# Patient Record
Sex: MALE | Race: WHITE | NOT HISPANIC OR LATINO | Employment: OTHER | URBAN - METROPOLITAN AREA
[De-identification: names, ages, dates, MRNs, and addresses within clinical notes are randomized per-mention and may not be internally consistent; named-entity substitution may affect disease eponyms.]

---

## 2017-02-09 DIAGNOSIS — E03.4 HYPOTHYROIDISM DUE TO ACQUIRED ATROPHY OF THYROID: Primary | ICD-10-CM

## 2017-02-09 RX ORDER — LEVOTHYROXINE SODIUM 88 UG/1
88 TABLET ORAL DAILY
Qty: 90 TABLET | Refills: 3 | OUTPATIENT
Start: 2017-02-09

## 2017-03-16 ENCOUNTER — OFFICE VISIT (OUTPATIENT)
Dept: FAMILY MEDICINE | Facility: CLINIC | Age: 72
End: 2017-03-16
Payer: COMMERCIAL

## 2017-03-16 VITALS
HEIGHT: 65 IN | DIASTOLIC BLOOD PRESSURE: 74 MMHG | SYSTOLIC BLOOD PRESSURE: 126 MMHG | OXYGEN SATURATION: 96 % | WEIGHT: 186 LBS | TEMPERATURE: 98.1 F | BODY MASS INDEX: 30.99 KG/M2 | HEART RATE: 58 BPM | RESPIRATION RATE: 16 BRPM

## 2017-03-16 DIAGNOSIS — R10.11 ABDOMINAL PAIN, RIGHT UPPER QUADRANT: ICD-10-CM

## 2017-03-16 DIAGNOSIS — R73.03 PRE-DIABETES: ICD-10-CM

## 2017-03-16 DIAGNOSIS — I71.40 ABDOMINAL AORTIC ANEURYSM (AAA) WITHOUT RUPTURE (H): Primary | ICD-10-CM

## 2017-03-16 DIAGNOSIS — I10 HTN, GOAL BELOW 140/90: ICD-10-CM

## 2017-03-16 DIAGNOSIS — M54.50 RIGHT-SIDED LOW BACK PAIN WITHOUT SCIATICA, UNSPECIFIED CHRONICITY: ICD-10-CM

## 2017-03-16 LAB
ALBUMIN UR-MCNC: NEGATIVE MG/DL
APPEARANCE UR: CLEAR
BILIRUB UR QL STRIP: NEGATIVE
COLOR UR AUTO: YELLOW
GLUCOSE UR STRIP-MCNC: NEGATIVE MG/DL
HBA1C MFR BLD: 6.9 % (ref 4.3–6)
HGB UR QL STRIP: NEGATIVE
KETONES UR STRIP-MCNC: NEGATIVE MG/DL
LEUKOCYTE ESTERASE UR QL STRIP: NEGATIVE
LIPASE SERPL-CCNC: 108 U/L (ref 73–393)
NITRATE UR QL: NEGATIVE
PH UR STRIP: 5.5 PH (ref 5–7)
SP GR UR STRIP: 1.02 (ref 1–1.03)
URN SPEC COLLECT METH UR: NORMAL
UROBILINOGEN UR STRIP-ACNC: 0.2 EU/DL (ref 0.2–1)

## 2017-03-16 PROCEDURE — 83036 HEMOGLOBIN GLYCOSYLATED A1C: CPT | Performed by: FAMILY MEDICINE

## 2017-03-16 PROCEDURE — 80053 COMPREHEN METABOLIC PANEL: CPT | Performed by: FAMILY MEDICINE

## 2017-03-16 PROCEDURE — 83690 ASSAY OF LIPASE: CPT | Performed by: FAMILY MEDICINE

## 2017-03-16 PROCEDURE — 81003 URINALYSIS AUTO W/O SCOPE: CPT | Performed by: FAMILY MEDICINE

## 2017-03-16 PROCEDURE — 99214 OFFICE O/P EST MOD 30 MIN: CPT | Performed by: FAMILY MEDICINE

## 2017-03-16 PROCEDURE — 36415 COLL VENOUS BLD VENIPUNCTURE: CPT | Performed by: FAMILY MEDICINE

## 2017-03-16 NOTE — MR AVS SNAPSHOT
"              After Visit Summary   3/16/2017    Rubio Gibbons    MRN: [id number]           Patient Information     Date Of Birth          1945        Visit Information        Provider Department      3/16/2017 9:00 AM Rigo Goddard MD [facility]        Today's Diagnoses     Abdominal aortic aneurysm (AAA) without rupture (H)    -  1    Abdominal pain, right upper quadrant        Right-sided low back pain without sciatica, unspecified chronicity        HTN, goal below 140/90        Pre-diabetes          Care Instructions    Fiber (Citracal) Metamucil)  30 grams daily        Follow-ups after your visit        Future tests that were ordered for you today     Open Future Orders        Priority Expected Expires Ordered    US Abdomen Complete Routine 6/14/2017 3/16/2018 3/16/2017            Who to contact     If you have questions or need follow up information about today's clinic visit or your schedule please contact [facility] directly at [phone].  Normal or non-critical lab and imaging results will be communicated to you by MyChart, letter or phone within 4 business days after the clinic has received the results. If you do not hear from us within 7 days, please contact the clinic through [employer] or phone. If you have a critical or abnormal lab result, we will notify you by phone as soon as possible.  Submit refill requests through [employer] or call your pharmacy and they will forward the refill request to us. Please allow 3 business days for your refill to be completed.          Additional Information About Your Visit        [employer] Information     [employer] lets you send messages to your doctor, view your test results, renew your prescriptions, schedule appointments and more. To sign up, go to [email or web address] . Click on \"Log in\" on the left side of the screen, which will take you to the Welcome page. Then click on \"Sign up Now\" on the right side of the page. " "    You will be asked to enter the access code listed below, as well as some personal information. Please follow the directions to create your username and password.     Your access code is: [email or web address]  Expires: 2017  9:10 AM     Your access code will  in 90 days. If you need help or a new code, please call your [facility] clinic or [phone].        Care EveryWhere ID     This is your Care EveryWhere ID. This could be used by other organizations to access your [facility] medical records  [id number]        Your Vitals Were     Pulse Temperature Respirations Height Pulse Oximetry BMI (Body Mass Index)    58 98.1  F (36.7  C) (Oral) 16 5' 5\" (1.651 m) 96% 30.95 kg/m2       Blood Pressure from Last 3 Encounters:   17 126/74   16 137/80   16 92/60    Weight from Last 3 Encounters:   17 186 lb (84.4 kg)   16 181 lb (82.1 kg)   16 181 lb 3.2 oz (82.2 kg)              We Performed the Following     *UA reflex to Microscopic and Culture ([facility] (except [place] and [place])     Comprehensive metabolic panel     Hemoglobin A1c     Lipase        Primary Care Provider Office Phone # Fax #    Rigo Goddard [phone]       [address] [address]        Thank you!     Thank you for choosing [facility]  for your care. Our goal is always to provide you with excellent care. Hearing back from our patients is one way we can continue to improve our services. Please take a few minutes to complete the written survey that you may receive in the mail after your visit with us. Thank you!             Your Updated Medication List - Protect others around you: Learn how to safely use, store and throw away your medicines at www.disposemymeds.org.          This list is accurate as of: 3/16/17  9:46 PM.  Always use your most recent med list.                   Brand Name Dispense " Instructions for use    aspirin 81 MG tablet     100 tablet    Take 1 tablet (81 mg) by mouth daily       atorvastatin 40 MG tablet    LIPITOR    90 tablet    Take 1 tablet (40 mg) by mouth daily       buPROPion 150 MG 12 hr tablet    WELLBUTRIN SR    90 tablet    Take 1 tablet (150 mg) by mouth daily       levothyroxine 88 MCG tablet    SYNTHROID/LEVOTHROID    90 tablet    Take 1 tablet (88 mcg) by mouth daily       lisinopril 5 MG tablet    PRINIVIL/ZESTRIL    90 tablet    Take 1 tablet (5 mg) by mouth daily       metoprolol 50 MG 24 hr tablet    TOPROL XL    90 tablet    Take 1 tablet (50 mg) by mouth daily       sildenafil 100 MG cap/tab    VIAGRA    6 tablet    Take 1 tablet (100 mg) by mouth daily as needed for erectile dysfunction Take 30 min to 4 hours before intercourse.  Never use with nitroglycerin, terazosin or doxazosin.       tiotropium 18 MCG capsule    SPIRIVA HANDIHALER    30 capsule    Inhale contents of one capsule daily.

## 2017-03-16 NOTE — NURSING NOTE
"Chief Complaint   Patient presents with     Back Pain       Initial There were no vitals taken for this visit. Estimated body mass index is 30.12 kg/(m^2) as calculated from the following:    Height as of 12/6/16: 5' 5\" (1.651 m).    Weight as of 12/6/16: 181 lb (82.1 kg).  Medication Reconciliation: complete left arm Jenifer Laguna MA      "

## 2017-03-16 NOTE — PROGRESS NOTES
SUBJECTIVE:                                                    Rubio Gibbons is a 71 year old male who presents to clinic today for the following health issues:    ABDOMINAL PAIN     Onset: 3 weeks. RUQ relieved by BM    Description:   Character: Sharp  Location: right upper quadrant  Radiation: lower back and buttock    Intensity: moderate, severe    Progression of Symptoms:   waxing and waning    Accompanying Signs & Symptoms:  Fever/Chills: no   Gas/Bloating: no   Nausea: no   Vomitting: no   Diarrhea: no   Constipation:no   Dysuria or Hematuria: no   Rash: no  Urinary frequency at night: no  Bowel movement modifies pain: YES - Patient states that after a bowel movement he experiences an alleviation of pain   History:   Trauma: no   Previous similar pain: no    Previous tests done: none    Precipitating factors:   Does the pain change with:     Food: no      BM: YES- gets better    Urination: no     Alleviating factors:      Therapies Tried and outcome:     LMP:  not applicable             Abdominal aortic aneurysm (AAA) without rupture (H): known, measured last fall      HTN, goal below 140/90: BP normal today  BP Readings from Last 6 Encounters:   03/16/17 126/74   12/06/16 137/80   11/17/16 92/60   11/01/16 121/80   09/22/16 102/82   02/29/16 120/74         Right-sided low back pain without sciatica, unspecified chronicity: The patient states that he has been experiencing right sided low back pain that radiates to his buttock       Pre-diabetes:   Lab Results   Component Value Date    A1C 6.5 09/22/2016    A1C 6.1 05/08/2014    A1C 6.5 01/30/2013    A1C 6.3 07/16/2009     Glucose   Date Value Ref Range Status   09/22/2016 123 (H) 70 - 99 mg/dL Final       Past Medical History   Diagnosis Date     Abdominal aortic aneurysm (AAA) without rupture (H) 10/13/2016     CAD (coronary artery disease)      Diabetes mellitus type 2, uncomplicated (H) 9/22/2016     Diet      WIN (dyspnea on exertion) 11/21/2014      "History of myocardial infarction in adulthood 9/22/2016     HTN, goal below 140/90 1/28/2014     Hyperlipidemia LDL goal <100 10/31/2010     Hypothyroidism 11/9/2011     Hypothyroidism due to acquired atrophy of thyroid 2/18/2016     Ischemic cardiomyopathy      LBP (low back pain) 11/21/2014     Microalbuminuria 11/7/2016     X1     Other abnormal glucose 5/8/2014     Panlobular emphysema (H) 9/22/2016     Presbycusis syndrome, bilateral 9/22/2016     Right-sided low back pain without sciatica, unspecified chronicity 3/16/2017     Tobacco use disorder 7/19/2006     Tubular adenoma        Past Surgical History   Procedure Laterality Date     Angioplasty  1998     Colonoscopy N/A 11/17/2016     Procedure: COLONOSCOPY;  Surgeon: Adam Lantigua MD;  Location:  GI       Family History   Problem Relation Age of Onset     Cardiovascular Father      heart attack     Cardiovascular Brother      heart attack     Cardiovascular Sister      2 sisters - minor cardiac problems- pt unsure of the exact nature       Social History   Substance Use Topics     Smoking status: Current Some Day Smoker     Packs/day: 0.25     Years: 40.00     Types: Cigarettes     Smokeless tobacco: Never Used     Alcohol use No       ROS: No hematochezia, rash cough    This document serves as a record of the services and decisions personally performed and made by Nitza Sierra MD. It was created on his behalf by Black Hampton a trained medical scribe. The creation of this document is based the provider's statements to the medical scribe. Black Hampton March 16, 2017 9:04 AM  OBJECTIVE:                                                    /74 (BP Location: Right arm, Patient Position: Chair, Cuff Size: Adult Regular)  Pulse 58  Temp 98.1  F (36.7  C) (Oral)  Resp 16  Ht 1.651 m (5' 5\")  Wt 84.4 kg (186 lb)  SpO2 96%  BMI 30.95 kg/m2  Body mass index is 30.95 kg/(m^2).  GEN: Healthy, Alert, No distress  ABD: soft without mass or organomegaly, " tenderness to palpation over RLQ   Neg SLR no antalgia  ASSESSMENT/PLAN:                                                    (R10.31) Abdominal pain, right lower quadrant  (primary encounter diagnosis)  Comment: Broaden database. Increase fiber intake (vegtables)  Plan: Comprehensive metabolic panel, Lipase, US         Abdomen Complete, *UA reflex to Microscopic and        Culture ([facility]         (except [place] and [place])            (I71.4) Abdominal aortic aneurysm (AAA) without rupture (H)  Comment: re assess  Plan:     (I10) HTN, goal below 140/90  Comment: Routine monitoring. BP normal today  BP Readings from Last 6 Encounters:   03/16/17 126/74   12/06/16 137/80   11/17/16 92/60   11/01/16 121/80   09/22/16 102/82   02/29/16 120/74   Plan: Comprehensive metabolic panel            (M54.5) Right-sided low back pain without sciatica, unspecified chronicity  Comment: known present, contributes  Plan: longer standing. Re assess after current W/u    (R73.03) Pre-diabetes  Comment: periodic   Lab Results   Component Value Date    A1C 6.5 09/22/2016    A1C 6.1 05/08/2014    A1C 6.5 01/30/2013    A1C 6.3 07/16/2009     Glucose   Date Value Ref Range Status   09/22/2016 123 (H) 70 - 99 mg/dL Final   Plan: Hemoglobin A1c              RCK in 2 months if abnormal lab or ultrasound, otherwise in 6 months Fiber for GI source. Endoscopy UTD    The information in this document, created by a scribe for me, accurately reflects the services I personally performed and the decisions made by me. I have reviewed and approved this document for accuracy. 9:04 AM March 16, 2017      ROLAND BRAXTON MD  [facility]

## 2017-03-16 NOTE — LETTER
[facility]  [address] [place], [address]  [phone]      March 16, 2017    Rubio Gibbons  [address] [facility] [address]          Dear Rubio,    The results of your recent tests are back. Tests are ok.  Enclosed is a copy of the results.  It was a pleasure to see you at your last appointment.  Results for orders placed or performed in visit on 03/16/17   Hemoglobin A1c   Result Value Ref Range    Hemoglobin A1C 6.9 (H) 4.3 - 6.0 %   *UA reflex to Microscopic and Culture ([facility] (except [place] and [place])   Result Value Ref Range    Color Urine Yellow     Appearance Urine Clear     Glucose Urine Negative NEG mg/dL    Bilirubin Urine Negative NEG    Ketones Urine Negative NEG mg/dL    Specific Gravity Urine 1.020 1.003 - 1.035    Blood Urine Negative NEG    pH Urine 5.5 5.0 - 7.0 pH    Protein Albumin Urine Negative NEG mg/dL    Urobilinogen Urine 0.2 0.2 - 1.0 EU/dL    Nitrite Urine Negative NEG    Leukocyte Esterase Urine Negative NEG    Source Midstream Urine          If you have any questions or concerns, please call myself or my nurse at [phone].          Sincerely,    Rigo Goddard MD  [id number]

## 2017-03-16 NOTE — NURSING NOTE
"Chief Complaint   Patient presents with     Abdominal Pain     right upper quadrant that radiates into back, patient states pain is worse in the morning, and gets better after having BM. Patient states he has had the pain for 3 weeks       Initial /74 (BP Location: Right arm, Patient Position: Chair, Cuff Size: Adult Regular)  Pulse 58  Temp 98.1  F (36.7  C) (Oral)  Resp 16  Ht 5' 5\" (1.651 m)  Wt 186 lb (84.4 kg)  SpO2 96%  BMI 30.95 kg/m2 Estimated body mass index is 30.95 kg/(m^2) as calculated from the following:    Height as of this encounter: 5' 5\" (1.651 m).    Weight as of this encounter: 186 lb (84.4 kg).  Medication Reconciliation: complete   Carol Lott CMA      "

## 2017-03-17 LAB
ALBUMIN SERPL-MCNC: 3.8 G/DL (ref 3.4–5)
ALP SERPL-CCNC: 86 U/L (ref 40–150)
ALT SERPL W P-5'-P-CCNC: 21 U/L (ref 0–70)
ANION GAP SERPL CALCULATED.3IONS-SCNC: 7 MMOL/L (ref 3–14)
AST SERPL W P-5'-P-CCNC: 17 U/L (ref 0–45)
BILIRUB SERPL-MCNC: 0.3 MG/DL (ref 0.2–1.3)
BUN SERPL-MCNC: 21 MG/DL (ref 7–30)
CALCIUM SERPL-MCNC: 9 MG/DL (ref 8.5–10.1)
CHLORIDE SERPL-SCNC: 105 MMOL/L (ref 94–109)
CO2 SERPL-SCNC: 29 MMOL/L (ref 20–32)
CREAT SERPL-MCNC: 1.07 MG/DL (ref 0.66–1.25)
GFR SERPL CREATININE-BSD FRML MDRD: 68 ML/MIN/1.7M2
GLUCOSE SERPL-MCNC: 135 MG/DL (ref 70–99)
POTASSIUM SERPL-SCNC: 4.4 MMOL/L (ref 3.4–5.3)
PROT SERPL-MCNC: 7 G/DL (ref 6.8–8.8)
SODIUM SERPL-SCNC: 141 MMOL/L (ref 133–144)

## 2017-04-14 DIAGNOSIS — I10 ESSENTIAL HYPERTENSION WITH GOAL BLOOD PRESSURE LESS THAN 140/90: ICD-10-CM

## 2017-04-15 NOTE — TELEPHONE ENCOUNTER
Pending Prescriptions:                       Disp   Refills    metoprolol (TOPROL-XL) 50 MG 24 hr tablet*90 tab*0            Sig: TAKE 1 TABLET(50 MG) BY MOUTH DAILY              Last Written Prescription Date: 9/22/2016  Last Fill Quantity: 90, # refills: 1  Last Office Visit with [facility], RAFAELA or [facility] prescribing provider: 3/16/2017, Nitza       Potassium   Date Value Ref Range Status   03/16/2017 4.4 3.4 - 5.3 mmol/L Final     Creatinine   Date Value Ref Range Status   03/16/2017 1.07 0.66 - 1.25 mg/dL Final     BP Readings from Last 3 Encounters:   03/16/17 126/74   12/06/16 137/80   11/17/16 92/60

## 2017-04-17 DIAGNOSIS — F17.200 TOBACCO USE DISORDER: ICD-10-CM

## 2017-04-17 DIAGNOSIS — E03.4 HYPOTHYROIDISM DUE TO ACQUIRED ATROPHY OF THYROID: ICD-10-CM

## 2017-04-17 DIAGNOSIS — I10 ESSENTIAL HYPERTENSION WITH GOAL BLOOD PRESSURE LESS THAN 140/90: ICD-10-CM

## 2017-04-17 RX ORDER — METOPROLOL SUCCINATE 50 MG/1
TABLET, EXTENDED RELEASE ORAL
Qty: 90 TABLET | Refills: 2 | Status: CANCELLED | OUTPATIENT
Start: 2017-04-17

## 2017-04-17 RX ORDER — LISINOPRIL 5 MG/1
5 TABLET ORAL DAILY
Qty: 90 TABLET | Refills: 1 | Status: SHIPPED | OUTPATIENT
Start: 2017-04-17 | End: 2017-09-25

## 2017-04-17 RX ORDER — METOPROLOL SUCCINATE 50 MG/1
TABLET, EXTENDED RELEASE ORAL
Qty: 90 TABLET | Refills: 2 | Status: SHIPPED | OUTPATIENT
Start: 2017-04-17 | End: 2017-09-25

## 2017-04-17 RX ORDER — LEVOTHYROXINE SODIUM 88 UG/1
88 TABLET ORAL DAILY
Qty: 90 TABLET | Refills: 3 | Status: CANCELLED | OUTPATIENT
Start: 2017-04-17

## 2017-04-17 RX ORDER — BUPROPION HYDROCHLORIDE 150 MG/1
150 TABLET, EXTENDED RELEASE ORAL DAILY
Qty: 90 TABLET | Refills: 0 | Status: SHIPPED | OUTPATIENT
Start: 2017-04-17 | End: 2017-09-25

## 2017-04-17 NOTE — TELEPHONE ENCOUNTER
Patient came into clinic today asking if could speak to RN or Dr. Goddard, informed him that they are busy and should call the clinic to talk to a nurse. He stated to me that he was waiting on a medication and approval from Dr. Goddard. He is going out of town and needed to get refills on those before he leaves in a month. I had the RNs look over the notes and Yun MCCLURE sent in the approval request for the medication he was waiting on. I do not believe some of these need a refill has he as enough already.     Informed patient that these should be done and ready at pharmacy, or should still have some refills to get.     Told him to call clinic if has any other questions.

## 2017-04-17 NOTE — TELEPHONE ENCOUNTER
See below, called pt, waiting for staff from hospital to call and schedule u/s, no one ever called, gave scheduling number to call, pt will call now to schedule apt, gone next week so might be after, also wants wellbutrin refill, takes for smoking cessation, tried w/o and has urge, does not want temptations on vacation, has refills on others    Last OV: 3/16/17  Reason for visit: AAA, back pain, HYPERTENSION, pre diabetes    BP Readings from Last 2 Encounters:   03/16/17 126/74   12/06/16 137/80     Lab Results   Component Value Date    CR 1.07 03/16/2017     Lab Results   Component Value Date    POTASSIUM 4.4 03/16/2017         RCK in 2 months if abnormal lab or ultrasound, otherwise in 6 months Fiber for GI source. Endoscopy UTD  Prescription approved per [facility] Refill Protocol.  Yun Washington, RN, BSN

## 2017-04-17 NOTE — TELEPHONE ENCOUNTER
Pt walks in, wants refills asap, see last visit notes, front staff will update pt request    RCK in 2 months if abnormal lab or ultrasound, otherwise in 6 months Fiber for GI source. Endoscopy UTD  Prescription approved per [facility] Refill Protocol.  Yun Washington, RN, BSN

## 2017-04-17 NOTE — TELEPHONE ENCOUNTER
See other refill encounter today  Prescription approved per [facility] Refill Protocol.  Yun Washington, RN, BSN

## 2017-04-19 RX ORDER — BUPROPION HYDROCHLORIDE 150 MG/1
TABLET, EXTENDED RELEASE ORAL
Qty: 90 TABLET | Refills: 0 | COMMUNITY
Start: 2017-04-19 | End: 2017-09-25

## 2017-05-11 DIAGNOSIS — E78.5 HYPERLIPIDEMIA LDL GOAL <100: ICD-10-CM

## 2017-05-11 RX ORDER — ATORVASTATIN CALCIUM 40 MG/1
TABLET, FILM COATED ORAL
Status: SHIPPED
Start: 2017-05-11 | End: 2017-09-25

## 2017-05-11 NOTE — TELEPHONE ENCOUNTER
1 year supply sent 9/22/16.    atorvastatin (LIPITOR) 40 MG tablet  Last Written Prescription Date: 9/22/16  Last Fill Quantity: 90,  # refills: 3   Last Office Visit with [facility], [facility] or [facility] prescribing provider:  3/16/2017                                              Fax sent to pharm informing above.  AIXA Raymond  May 11, 2017  8:45 AM

## 2017-05-13 DIAGNOSIS — I10 ESSENTIAL HYPERTENSION WITH GOAL BLOOD PRESSURE LESS THAN 140/90: ICD-10-CM

## 2017-05-13 NOTE — TELEPHONE ENCOUNTER
Pending Prescriptions:                       Disp   Refills    lisinopril (PRINIVIL/ZESTRIL) 5 MG tablet*90 tab*0            Sig: TAKE 1 TABLET(5 MG) BY MOUTH DAILY        RX WAS LAST FILLED 4/17/2017      Last Written Prescription Date: 4/17/2017  Last Fill Quantity: 90, # refills: 1  Last Office Visit with [facility], [facility] or [facility] prescribing provider: 3/16/2017, Nitza       Potassium   Date Value Ref Range Status   03/16/2017 4.4 3.4 - 5.3 mmol/L Final     Creatinine   Date Value Ref Range Status   03/16/2017 1.07 0.66 - 1.25 mg/dL Final     BP Readings from Last 3 Encounters:   03/16/17 126/74   12/06/16 137/80   11/17/16 92/60

## 2017-05-16 RX ORDER — LISINOPRIL 5 MG/1
TABLET ORAL
Qty: 90 TABLET | Refills: 0
Start: 2017-05-16

## 2017-06-20 ENCOUNTER — HOSPITAL ENCOUNTER (OUTPATIENT)
Dept: ULTRASOUND IMAGING | Facility: CLINIC | Age: 72
Discharge: HOME | End: 2017-06-20
Attending: FAMILY MEDICINE | Admitting: FAMILY MEDICINE
Payer: COMMERCIAL

## 2017-06-20 PROCEDURE — 76700 US EXAM ABDOM COMPLETE: CPT

## 2017-06-22 ENCOUNTER — TELEPHONE (OUTPATIENT)
Dept: FAMILY MEDICINE | Facility: CLINIC | Age: 72
End: 2017-06-22

## 2017-06-22 DIAGNOSIS — K80.20 CALCULUS OF GALLBLADDER WITHOUT CHOLECYSTITIS WITHOUT OBSTRUCTION: Primary | ICD-10-CM

## 2017-06-22 NOTE — TELEPHONE ENCOUNTER
Left message for patient to call clinic back.  Carol Lott CMA    Notes Recorded by Roland Braxton MD on 6/22/2017 at 1:15 PM  Yes, you have gallstones. The aorta is stable. I would recommend we have you talk to the surgeons. I have taken the liberty of contacting them: they will call you  ROLAND BRAXTON  Results for orders placed or performed during the hospital encounter of 06/20/17   US Abdomen Complete    Narrative    ULTRASOUND ABDOMEN COMPLETE  6/20/2017 8:30 AM    HISTORY: Known abdominal aortic aneurysm, right upper quadrant pain.    COMPARISON: 10/11/2016    FINDINGS: The liver is unremarkable, without evidence for hepatic mass  or fatty infiltration. Multiple echogenic shadowing stones are seen in  the gallbladder lumen. Gallbladder wall is at the upper limits of  normal at 2.8 mm. The technologist reports a negative sonographic  Hernandez sign. No intra- or extrahepatic bile duct dilatation. Pancreas  is partially obscured by overlying bowel gas, but appears unremarkable  where seen. Unremarkable spleen. Both kidneys are normal. Mild left  pelviectasis. No right hydronephrosis. IVC is normal. There is  aneurysmal dilatation of the abdominal aorta measuring 3.8 x 3.9 cm,  previously 3.8 x 4.4 cm.      Impression    IMPRESSION:   1. Cholelithiasis without evidence of cholecystitis.  2. Abdominal aortic aneurysm measuring 3.8 x 3.9 cm; previously this  measured 3.8 x 4.4 cm. The difference in size may be due to technical  differences in measuring.  3. Mild left pelviectasis.    JUANY RUIZ DO

## 2017-06-23 ENCOUNTER — TELEPHONE (OUTPATIENT)
Dept: SURGERY | Facility: CLINIC | Age: 72
End: 2017-06-23

## 2017-07-03 ENCOUNTER — OFFICE VISIT (OUTPATIENT)
Dept: SURGERY | Facility: CLINIC | Age: 72
End: 2017-07-03
Payer: COMMERCIAL

## 2017-07-03 VITALS
BODY MASS INDEX: 28.12 KG/M2 | HEIGHT: 66 IN | WEIGHT: 175 LBS | DIASTOLIC BLOOD PRESSURE: 72 MMHG | HEART RATE: 55 BPM | OXYGEN SATURATION: 95 % | SYSTOLIC BLOOD PRESSURE: 136 MMHG

## 2017-07-03 DIAGNOSIS — K80.20 CALCULUS OF GALLBLADDER WITHOUT CHOLECYSTITIS WITHOUT OBSTRUCTION: Primary | ICD-10-CM

## 2017-07-03 PROCEDURE — 99203 OFFICE O/P NEW LOW 30 MIN: CPT | Performed by: SURGERY

## 2017-07-03 ASSESSMENT — ENCOUNTER SYMPTOMS
BRUISES/BLEEDS EASILY: 1
ABDOMINAL PAIN: 1
CLAUDICATION: 1

## 2017-07-03 NOTE — PROGRESS NOTES
Chief complaint:  Cholelithiasis    HPI:  This patient is a 72 year old  male who presents with a new diagnosis of cholelithiasis.  He states that for the past few months he's had low abdominal pain which he describes as a stomach upset.  He denies nausea, no emesis, occasional constipation.  There is no trigger from his diet.  He had a colonoscopy in the past year without polyps.  He denies any jaundice, icterus or orange urine.    Past Medical History:   has a past medical history of Abdominal aortic aneurysm (AAA) without rupture (H) (10/13/2016); CAD (coronary artery disease); Diabetes mellitus type 2, uncomplicated (H) (9/22/2016); WIN (dyspnea on exertion) (11/21/2014); History of myocardial infarction in adulthood (9/22/2016); HTN, goal below 140/90 (1/28/2014); Hyperlipidemia LDL goal <100 (10/31/2010); Hypothyroidism (11/9/2011); Hypothyroidism due to acquired atrophy of thyroid (2/18/2016); Ischemic cardiomyopathy; LBP (low back pain) (11/21/2014); Microalbuminuria (11/7/2016); Other abnormal glucose (5/8/2014); Panlobular emphysema (H) (9/22/2016); Presbycusis syndrome, bilateral (9/22/2016); Right-sided low back pain without sciatica, unspecified chronicity (3/16/2017); Tobacco use disorder (7/19/2006); and Tubular adenoma.    Past Surgical History:  Past Surgical History:   Procedure Laterality Date     ANGIOPLASTY  1998     COLONOSCOPY N/A 11/17/2016    Procedure: COLONOSCOPY;  Surgeon: Adam Lantigua MD;  Location:  GI        Social History:  Social History     Social History     Marital status:      Spouse name: N/A     Number of children: N/A     Years of education: N/A     Occupational History     Not on file.     Social History Main Topics     Smoking status: Current Some Day Smoker     Packs/day: 0.25     Years: 40.00     Types: Cigarettes     Smokeless tobacco: Never Used     Alcohol use No     Drug use: No     Sexual activity: Yes     Partners: Female     Other Topics  Concern     Parent/Sibling W/ Cabg, Mi Or Angioplasty Before 65f 55m? Yes     Social History Narrative        Family History:  Family History   Problem Relation Age of Onset     Cardiovascular Father      heart attack     Coronary Artery Disease Father      CEREBROVASCULAR DISEASE Father      Cardiovascular Brother      heart attack     Cardiovascular Sister      2 sisters - minor cardiac problems- pt unsure of the exact nature       Review of Systems:  The 10 point Review of Systems is negative other than noted in the HPI and above.    Physical Exam:  General - This is a well developed, well nourished male in no apparent distress.  HEENT - Normocephalic, atraumatic.  No scleral icterus, membranes moist.  Respiratory- non-labored  Gastrointestinal:   soft, non-distended without tenderness or Hernandez sign  He reports an area just inferior to his umbilicus as the site of his pain previously, it is non-tender today.  Extremities - warm without edema  Neurologic - non-focal  Psych-normal affect      Relevant labs:  LFTs and lipase normal    Imaging:  Films personally reviewed by me today.  Ultrasound showing stones, no wall thickening or duct dilation    Assessment and Plan:  I reviewed the pathophysiology of biliary tract disease, its natural history and indications for cholecystectomy.  I relayed to him that I did not think he had symptoms consistent with symptomatic cholelithiasis.  I offered a CT to rule out an unconsidered process, though I think it will be low-yield.  He is feeling better and will contemplate this option and call us if he desired imaging in the future.    Donovan Katz MD  [facility]    Please route or send letter to:  Primary Care Provider (PCP)

## 2017-07-03 NOTE — MR AVS SNAPSHOT
"              After Visit Summary   7/3/2017    Rubio Gibbons    MRN: [id number]           Patient Information     Date Of Birth          1945        Visit Information        Provider Department      7/3/2017 9:00 AM Donovan Chavez MD [facility] General Surgery      Today's Diagnoses     Calculus of gallbladder without cholecystitis without obstruction    -  1       Follow-ups after your visit        Who to contact     If you have questions or need follow up information about today's clinic visit or your schedule please contact [facility] directly at [phone].  Normal or non-critical lab and imaging results will be communicated to you by [facility], letter or phone within 4 business days after the clinic has received the results. If you do not hear from us within 7 days, please contact the clinic through [facility] or phone. If you have a critical or abnormal lab result, we will notify you by phone as soon as possible.  Submit refill requests through [facility] or call your pharmacy and they will forward the refill request to us. Please allow 3 business days for your refill to be completed.          Additional Information About Your Visit        MyChart Information     [facility] lets you send messages to your doctor, view your test results, renew your prescriptions, schedule appointments and more. To sign up, go to [email or web address] . Click on \"Log in\" on the left side of the screen, which will take you to the Welcome page. Then click on \"Sign up Now\" on the right side of the page.     You will be asked to enter the access code listed below, as well as some personal information. Please follow the directions to create your username and password.     Your access code is: [email or web address]  Expires: 10/1/2017  9:19 AM     Your access code will  in 90 days. If you need help or a new code, please call your [facility] clinic or " "[phone].        Care EveryWhere ID     This is your Care EveryWhere ID. This could be used by other organizations to access your [facility] medical records  [id number]        Your Vitals Were     Pulse Height Pulse Oximetry BMI (Body Mass Index)          55 5' 6\" (1.676 m) 95% 28.25 kg/m2         Blood Pressure from Last 3 Encounters:   07/03/17 136/72   03/16/17 126/74   12/06/16 137/80    Weight from Last 3 Encounters:   07/03/17 175 lb (79.4 kg)   03/16/17 186 lb (84.4 kg)   12/06/16 181 lb (82.1 kg)              Today, you had the following     No orders found for display       Primary Care Provider Office Phone # Fax #    Rigo Goddard [phone]       [address] [address]        Equal Access to Services     PATSY SANTOS : Hadii aad ku hadasho Soomaali, waaxda luqadaha, qaybta kaalmada adeegyada, waxay stephenin haygeoffreyn sherrie childress . So [facility] [phone].    ATENCIÓN: Si sonula eleuterio, tiene a cervantes disposición servicios gratuitos de asistencia lingüística. Llame al [phone].    We comply with applicable federal civil rights laws and [place] laws. We do not discriminate on the basis of race, color, national origin, age, disability sex, sexual orientation or gender identity.            Thank you!     Thank you for choosing [facility]  for your care. Our goal is always to provide you with excellent care. Hearing back from our patients is one way we can continue to improve our services. Please take a few minutes to complete the written survey that you may receive in the mail after your visit with us. Thank you!             Your Updated Medication List - Protect others around you: Learn how to safely use, store and throw away your medicines at www.disposemymeds.org.          This list is accurate as of: 7/3/17  9:19 AM.  Always use your most recent med list.                   Brand Name Dispense Instructions for use " Diagnosis    aspirin 81 MG tablet     100 tablet    Take 1 tablet (81 mg) by mouth daily    History of myocardial infarction in adulthood       * atorvastatin 40 MG tablet    LIPITOR    90 tablet    Take 1 tablet (40 mg) by mouth daily    Hyperlipidemia LDL goal <100       * atorvastatin 40 MG tablet    LIPITOR     TAKE 1 TABLET BY MOUTH DAILY    Hyperlipidemia LDL goal <100       * buPROPion 150 MG 12 hr tablet    WELLBUTRIN SR    90 tablet    Take 1 tablet (150 mg) by mouth daily    Tobacco use disorder       * buPROPion 150 MG 12 hr tablet    WELLBUTRIN SR    90 tablet    TAKE 1 TABLET(150 MG) BY MOUTH DAILY    Tobacco use disorder       levothyroxine 88 MCG tablet    SYNTHROID/LEVOTHROID    90 tablet    Take 1 tablet (88 mcg) by mouth daily    Hypothyroidism due to acquired atrophy of thyroid       lisinopril 5 MG tablet    PRINIVIL/ZESTRIL    90 tablet    Take 1 tablet (5 mg) by mouth daily    Essential hypertension with goal blood pressure less than 140/90       metoprolol 50 MG 24 hr tablet    TOPROL-XL    90 tablet    TAKE 1 TABLET(50 MG) BY MOUTH DAILY    Essential hypertension with goal blood pressure less than 140/90       sildenafil 100 MG cap/tab    VIAGRA    6 tablet    Take 1 tablet (100 mg) by mouth daily as needed for erectile dysfunction Take 30 min to 4 hours before intercourse.  Never use with nitroglycerin, terazosin or doxazosin.    Impotence of organic origin       tiotropium 18 MCG capsule    SPIRIVA HANDIHALER    30 capsule    Inhale contents of one capsule daily.    Panlobular emphysema (H)       * Notice:  This list has 4 medication(s) that are the same as other medications prescribed for you. Read the directions carefully, and ask your doctor or other care provider to review them with you.

## 2017-07-03 NOTE — LETTER
July 3, 2017    RE:  Rubio Gibbons-: [birth year]    Chief complaint:  Cholelithiasis     HPI:  This patient is a 72 year old  male who presents with a new diagnosis of cholelithiasis.  He states that for the past few months he's had low abdominal pain which he describes as a stomach upset.  He denies nausea, no emesis, occasional constipation.  There is no trigger from his diet.  He had a colonoscopy in the past year without polyps.  He denies any jaundice, icterus or orange urine.     Past Medical History:   has a past medical history of Abdominal aortic aneurysm (AAA) without rupture (H) (10/13/2016); CAD (coronary artery disease); Diabetes mellitus type 2, uncomplicated (H) (2016); WIN (dyspnea on exertion) (2014); History of myocardial infarction in adulthood (2016); HTN, goal below 140/90 (2014); Hyperlipidemia LDL goal <100 (10/31/2010); Hypothyroidism (2011); Hypothyroidism due to acquired atrophy of thyroid (2016); Ischemic cardiomyopathy; LBP (low back pain) (2014); Microalbuminuria (2016); Other abnormal glucose (2014); Panlobular emphysema (H) (2016); Presbycusis syndrome, bilateral (2016); Right-sided low back pain without sciatica, unspecified chronicity (3/16/2017); Tobacco use disorder (2006); and Tubular adenoma.     Review of Systems:  The 10 point Review of Systems is negative other than noted in the HPI and above.     Physical Exam:  General - This is a well developed, well nourished male in no apparent distress.  HEENT - Normocephalic, atraumatic.  No scleral icterus, membranes moist.  Respiratory- non-labored  Gastrointestinal:                        soft, non-distended without tenderness or Hernandez sign  He reports an area just inferior to his umbilicus as the site of his pain previously, it is non-tender today.  Extremities - warm without edema  Neurologic - non-focal  Psych-normal affect        Relevant labs:  LFTs and  lipase normal     Imaging:  Films personally reviewed by me today.  Ultrasound showing stones, no wall thickening or duct dilation     Assessment and Plan:  I reviewed the pathophysiology of biliary tract disease, its natural history and indications for cholecystectomy.  I relayed to him that I did not think he had symptoms consistent with symptomatic cholelithiasis.  I offered a CT to rule out an unconsidered process, though I think it will be low-yield.  He is feeling better and will contemplate this option and call us if he desired imaging in the future.     Donovan Katz MD  [facility]

## 2017-07-03 NOTE — PROGRESS NOTES
HPI      ROS (Review of Systems):      Positive for diabetes and DM controlled with Diet.   Cardiovascular: Positive for leg pain, hypertension and hyperlipidemia.   GASTROINTESTINAL: Positive for abdominal pain.   MUSCULOSKELETAL: Positive for back pain.   Endo/Heme/Allergies: Bruises/bleeds easily.          Physical Exam

## 2017-07-06 DIAGNOSIS — N52.9 IMPOTENCE OF ORGANIC ORIGIN: ICD-10-CM

## 2017-07-07 RX ORDER — SILDENAFIL CITRATE 100 MG
TABLET ORAL
Qty: 6 TABLET | Refills: 0 | Status: SHIPPED | OUTPATIENT
Start: 2017-07-07 | End: 2017-09-25

## 2017-07-07 NOTE — TELEPHONE ENCOUNTER
VIAGRA 100 MG cap/tab      Last Written Prescription Date: 5/6/16  Last Fill Quantity: 6, # refills: 1    Last Office Visit with G, P or [facility] prescribing provider:  3/16/2017     Future Office Visit:        BP Readings from Last 3 Encounters:   07/03/17 136/72   03/16/17 126/74   12/06/16 137/80     AIXA Raymond  July 7, 2017  9:41 AM

## 2017-09-07 ENCOUNTER — TELEPHONE (OUTPATIENT)
Dept: FAMILY MEDICINE | Facility: CLINIC | Age: 72
End: 2017-09-07

## 2017-09-07 NOTE — TELEPHONE ENCOUNTER
Panel Management Review      Patient has the following on his problem list:     Diabetes    ASA: Passed    Last A1C  Lab Results   Component Value Date    A1C 6.9 03/16/2017    A1C 6.5 09/22/2016    A1C 6.1 05/08/2014    A1C 6.5 01/30/2013    A1C 6.3 07/16/2009     A1C tested: Passed    Last LDL:    Lab Results   Component Value Date    CHOL 136 11/01/2016     Lab Results   Component Value Date    HDL 33 11/01/2016     Lab Results   Component Value Date    LDL 70 11/01/2016     Lab Results   Component Value Date    TRIG 167 11/01/2016     Lab Results   Component Value Date    CHOLHDLRATIO 3.5 11/21/2014     Lab Results   Component Value Date    NHDL 103 11/01/2016       Is the patient on a Statin? YES             Is the patient on Aspirin? YES    Medications     HMG CoA Reductase Inhibitors    atorvastatin (LIPITOR) 40 MG tablet    atorvastatin (LIPITOR) 40 MG tablet    Salicylates    aspirin 81 MG tablet          Last three blood pressure readings:  BP Readings from Last 3 Encounters:   07/03/17 136/72   03/16/17 126/74   12/06/16 137/80       Date of last diabetes office visit: 3/16/17     Tobacco History:     History   Smoking Status     Current Some Day Smoker     Packs/day: 0.25     Years: 40.00     Types: Cigarettes   Smokeless Tobacco     Never Used         Hypertension   Last three blood pressure readings:  BP Readings from Last 3 Encounters:   07/03/17 136/72   03/16/17 126/74   12/06/16 137/80     Blood pressure: Passed    HTN Guidelines:  Age 18-59 BP range:  Less than 140/90  Age 60-85 with Diabetes:  Less than 140/90  Age 60-85 without Diabetes:  less than 150/90          Composite cancer screening  Chart review shows that this patient is due/due soon for the following None  Summary:    Patient is due/failing the following:   A1C and BP CHECK    Action needed:   A1C and BP     Type of outreach:    Phone, left message for patient to call back.     Questions for provider review:    None                                                                                                                                     Jenifer Laguna MA       Chart routed to Care Team [id number] .

## 2017-09-25 ENCOUNTER — OFFICE VISIT (OUTPATIENT)
Dept: FAMILY MEDICINE | Facility: CLINIC | Age: 72
End: 2017-09-25
Payer: COMMERCIAL

## 2017-09-25 VITALS
HEART RATE: 58 BPM | RESPIRATION RATE: 14 BRPM | HEIGHT: 66 IN | BODY MASS INDEX: 28.28 KG/M2 | WEIGHT: 176 LBS | DIASTOLIC BLOOD PRESSURE: 83 MMHG | TEMPERATURE: 98.7 F | SYSTOLIC BLOOD PRESSURE: 139 MMHG

## 2017-09-25 DIAGNOSIS — Z00.00 ROUTINE GENERAL MEDICAL EXAMINATION AT A HEALTH CARE FACILITY: Primary | ICD-10-CM

## 2017-09-25 DIAGNOSIS — M62.838 MUSCLE SPASM: ICD-10-CM

## 2017-09-25 DIAGNOSIS — I10 ESSENTIAL HYPERTENSION WITH GOAL BLOOD PRESSURE LESS THAN 140/90: ICD-10-CM

## 2017-09-25 DIAGNOSIS — E03.4 HYPOTHYROIDISM DUE TO ACQUIRED ATROPHY OF THYROID: ICD-10-CM

## 2017-09-25 DIAGNOSIS — N52.9 IMPOTENCE OF ORGANIC ORIGIN: ICD-10-CM

## 2017-09-25 DIAGNOSIS — I25.2 HISTORY OF MYOCARDIAL INFARCTION IN ADULTHOOD: ICD-10-CM

## 2017-09-25 DIAGNOSIS — E11.9 TYPE 2 DIABETES MELLITUS WITHOUT COMPLICATION, WITHOUT LONG-TERM CURRENT USE OF INSULIN (H): ICD-10-CM

## 2017-09-25 DIAGNOSIS — F17.200 TOBACCO USE DISORDER: ICD-10-CM

## 2017-09-25 DIAGNOSIS — E78.5 HYPERLIPIDEMIA LDL GOAL <100: ICD-10-CM

## 2017-09-25 DIAGNOSIS — J43.1 PANLOBULAR EMPHYSEMA (H): ICD-10-CM

## 2017-09-25 LAB
CREAT UR-MCNC: 86 MG/DL
HBA1C MFR BLD: 6.7 % (ref 4.3–6)
MICROALBUMIN UR-MCNC: 8 MG/L
MICROALBUMIN/CREAT UR: 8.88 MG/G CR (ref 0–17)

## 2017-09-25 PROCEDURE — 99207 C FOOT EXAM  NO CHARGE: CPT | Mod: 25 | Performed by: FAMILY MEDICINE

## 2017-09-25 PROCEDURE — 80061 LIPID PANEL: CPT | Performed by: FAMILY MEDICINE

## 2017-09-25 PROCEDURE — 82043 UR ALBUMIN QUANTITATIVE: CPT | Performed by: FAMILY MEDICINE

## 2017-09-25 PROCEDURE — 36415 COLL VENOUS BLD VENIPUNCTURE: CPT | Performed by: FAMILY MEDICINE

## 2017-09-25 PROCEDURE — 99397 PER PM REEVAL EST PAT 65+ YR: CPT | Performed by: FAMILY MEDICINE

## 2017-09-25 PROCEDURE — 80053 COMPREHEN METABOLIC PANEL: CPT | Performed by: FAMILY MEDICINE

## 2017-09-25 PROCEDURE — 84443 ASSAY THYROID STIM HORMONE: CPT | Performed by: FAMILY MEDICINE

## 2017-09-25 PROCEDURE — 83036 HEMOGLOBIN GLYCOSYLATED A1C: CPT | Performed by: FAMILY MEDICINE

## 2017-09-25 RX ORDER — LEVOTHYROXINE SODIUM 88 UG/1
88 TABLET ORAL DAILY
Qty: 90 TABLET | Refills: 3 | Status: SHIPPED | OUTPATIENT
Start: 2017-09-25 | End: 2018-10-04

## 2017-09-25 RX ORDER — ATORVASTATIN CALCIUM 40 MG/1
40 TABLET, FILM COATED ORAL DAILY
Qty: 90 TABLET | Refills: 3 | Status: SHIPPED | OUTPATIENT
Start: 2017-09-25 | End: 2018-10-04

## 2017-09-25 RX ORDER — METOPROLOL SUCCINATE 50 MG/1
TABLET, EXTENDED RELEASE ORAL
Qty: 90 TABLET | Refills: 3 | Status: SHIPPED | OUTPATIENT
Start: 2017-09-25 | End: 2018-11-12

## 2017-09-25 RX ORDER — TIOTROPIUM BROMIDE 18 UG/1
CAPSULE ORAL; RESPIRATORY (INHALATION)
Qty: 90 CAPSULE | Refills: 3 | Status: SHIPPED | OUTPATIENT
Start: 2017-09-25 | End: 2019-02-01

## 2017-09-25 RX ORDER — LISINOPRIL 5 MG/1
5 TABLET ORAL DAILY
Qty: 90 TABLET | Refills: 1 | Status: SHIPPED | OUTPATIENT
Start: 2017-09-25 | End: 2018-05-13

## 2017-09-25 RX ORDER — SILDENAFIL 100 MG/1
TABLET, FILM COATED ORAL
Qty: 8 TABLET | Refills: 11 | Status: SHIPPED | OUTPATIENT
Start: 2017-09-25 | End: 2019-02-01

## 2017-09-25 NOTE — PROGRESS NOTES
"  Muscle spasm acral spasm responsive to motion mostly at night rarely over times no therapies tried  ROS no weakness  /83 (BP Location: Left arm, Patient Position: Chair, Cuff Size: Adult Regular)  Pulse 58  Temp 98.7  F (37.1  C) (Oral)  Resp 14  Ht 5' 6\" (1.676 m)  Wt 176 lb (79.8 kg)  BMI 28.41 kg/m2  No synovitis, weakness    ASSESSMENT / PLAN:        (M62.838) Muscle spasm  Comment:he is not interested in additional medicines.  Plan: Comprehensive metabolic panel        Discussed dirk hx            Rigo Goddard MD      "

## 2017-09-25 NOTE — PATIENT INSTRUCTIONS
Preventive Health Recommendations:   Male Ages 65 and over    Yearly exam:             See your health care provider every year in order to  o   Review health changes.   o   Discuss preventive care.    o   Review your medicines if your doctor has prescribed any.    Talk with your health care provider about whether you should have a test to screen for prostate cancer (PSA).    Every 3 years, have a diabetes test (fasting glucose). If you are at risk for diabetes, you should have this test more often.    Every 5 years, have a cholesterol test. Have this test more often if you are at risk for high cholesterol or heart disease.     Every 10 years, have a colonoscopy. Or, have a yearly FIT test (stool test). These exams will check for colon cancer.    Talk to with your health care provider about screening for Abdominal Aortic Aneurysm if you have a family history of AAA or have a history of smoking.    Shots:     Get a flu shot each year.     Get a tetanus shot every 10 years.     Talk to your doctor about your pneumonia vaccines. There are now two you should receive - Pneumovax (PPSV 23) and Prevnar (PCV 13).     Talk to your doctor about a shingles vaccine.     Talk to your doctor about the hepatitis B vaccine.  Nutrition:     Eat at least 5 servings of fruits and vegetables each day.     Eat whole-grain bread, whole-wheat pasta and brown rice instead of white grains and rice.     Talk to your provider about Calcium and Vitamin D.   Lifestyle    Exercise for at least 150 minutes a week (30 minutes a day, 5 days a week). This will help you control your weight and prevent disease.     Limit alcohol to one drink per day.     No smoking.     Wear sunscreen to prevent skin cancer.     See your dentist every six months for an exam and cleaning.     See your eye doctor every 1 to 2 years to screen for conditions such as glaucoma, macular degeneration, cataracts, etc       [facility]

## 2017-09-25 NOTE — NURSING NOTE
"Chief Complaint   Patient presents with     Physical     and A1C, fasting      Recheck Medication     Different medication than Viagra        Initial /83 (BP Location: Left arm, Patient Position: Chair, Cuff Size: Adult Regular)  Pulse 58  Temp 98.7  F (37.1  C) (Oral)  Resp 14  Ht 5' 6\" (1.676 m)  Wt 176 lb (79.8 kg)  BMI 28.41 kg/m2 Estimated body mass index is 28.41 kg/(m^2) as calculated from the following:    Height as of this encounter: 5' 6\" (1.676 m).    Weight as of this encounter: 176 lb (79.8 kg).  Medication Reconciliation: complete left arm Jenifer Laguna MA      "

## 2017-09-25 NOTE — LETTER
September 26, 2017      Rubio Gibbons  [address] [facility] [place] [address]        Dear ,    Labs look real good    Resulted Orders   Hemoglobin A1c   Result Value Ref Range    Hemoglobin A1C 6.7 (H) 4.3 - 6.0 %   Comprehensive metabolic panel   Result Value Ref Range    Sodium 141 133 - 144 mmol/L    Potassium 4.9 3.4 - 5.3 mmol/L    Chloride 105 94 - 109 mmol/L    Carbon Dioxide 25 20 - 32 mmol/L    Anion Gap 11 3 - 14 mmol/L    Glucose 131 (H) 70 - 99 mg/dL      Comment:      Fasting specimen    Urea Nitrogen 17 7 - 30 mg/dL    Creatinine 1.01 0.66 - 1.25 mg/dL    GFR Estimate 72 >60 mL/min/1.7m2      Comment:      Non  GFR Calc    GFR Estimate If Black 88 >60 mL/min/1.7m2      Comment:       GFR Calc    Calcium 9.2 8.5 - 10.1 mg/dL    Bilirubin Total 0.7 0.2 - 1.3 mg/dL    Albumin 3.6 3.4 - 5.0 g/dL    Protein Total 6.9 6.8 - 8.8 g/dL    Alkaline Phosphatase 74 40 - 150 U/L    ALT 19 0 - 70 U/L    AST 18 0 - 45 U/L   Lipid panel reflex to direct LDL   Result Value Ref Range    Cholesterol 138 <200 mg/dL    Triglycerides 161 (H) <150 mg/dL      Comment:      Borderline high:  150-199 mg/dl  High:             200-499 mg/dl  Very high:       >499 mg/dl  Fasting specimen      HDL Cholesterol 35 (L) >39 mg/dL    LDL Cholesterol Calculated 71 <100 mg/dL      Comment:      Desirable:       <100 mg/dl    Non HDL Cholesterol 103 <130 mg/dL   TSH with free T4 reflex   Result Value Ref Range    TSH 1.62 0.40 - 4.00 mU/L   Albumin Random Urine Quantitative with Creat Ratio   Result Value Ref Range    Creatinine Urine 86 mg/dL    Albumin Urine mg/L 8 mg/L    Albumin Urine mg/g Cr 8.88 0 - 17 mg/g Cr       If you have any questions or concerns, please call the clinic at the number listed above.       Sincerely,        Rigo Goddard MD

## 2017-09-25 NOTE — PROGRESS NOTES
"SUBJECTIVE:   CC: Rubio Gibbons is an 72 year old male who presents for preventative health visit.     Routine general medical examination at a health care facility  (primary encounter diagnosis)  Physical   Annual:     Getting at least 3 servings of Calcium per day::  Yes    Bi-annual eye exam::  Yes    Dental care twice a year::  NO    Sleep apnea or symptoms of sleep apnea::  None    Diet::  Diabetic    Taking medications regularly::  Yes    Medication side effects::  None    Additional concerns today::  No    Type 2 diabetes mellitus without complication, without long-term current use of insulin (H)    Panlobular emphysema (H): Clinically stable        Hypothyroidism due to acquired atrophy of thyroid  TSH   Date Value Ref Range Status   09/22/2016 1.55 0.40 - 4.00 mU/L Final   ]    Tobacco use disorder: \"We don't want to talk about that\". He says he has not been taking his buproprion.      Hyperlipidemia LDL goal <100: Patient is fasting for labs    Impotence of organic origin: Patient says viagra prices have been increasing, wondering if there is a cheaper medication he can take.     Essential hypertension with goal blood pressure less than 140/90  BP Readings from Last 6 Encounters:   09/25/17 139/83   07/03/17 136/72   03/16/17 126/74   12/06/16 137/80   11/17/16 92/60   11/01/16 121/80       History of myocardial infarction in adulthood: No symptoms no recent testing      Today's PHQ-2 Score:   PHQ-2 ( 1999 Pf[employer]) 9/25/2017   Q1: Little interest or pleasure in doing things 0   Q2: Feeling down, depressed or hopeless 0   PHQ-2 Score 0   Q1: Little interest or pleasure in doing things Not at all   Q2: Feeling down, depressed or hopeless Not at all   PHQ-2 Score 0       Abuse: Current or Past(Physical, Sexual or Emotional)- No  Do you feel safe in your environment - Yes    Social History   Substance Use Topics     Smoking status: Current Some Day Smoker     Packs/day: 0.25     Years: 40.00     Types: " Cigarettes     Smokeless tobacco: Never Used     Alcohol use No     none    Last PSA:   PSA   Date Value Ref Range Status   01/30/2013 0.50 0 - 4 ug/L Final       Reviewed orders with patient. Reviewed health maintenance and updated orders accordingly -       Reviewed and updated as needed this visit by clinical staff  Tobacco  Allergies  Meds  Med Hx  Surg Hx  Fam Hx  Soc Hx      Past Medical History:   Diagnosis Date     Abdominal aortic aneurysm (AAA) without rupture (H) 10/13/2016     CAD (coronary artery disease)      Diabetes mellitus type 2, uncomplicated (H) 9/22/2016    Diet      WIN (dyspnea on exertion) 11/21/2014     History of myocardial infarction in adulthood 9/22/2016     HTN, goal below 140/90 1/28/2014     Hyperlipidemia LDL goal <100 10/31/2010     Hypothyroidism 11/9/2011     Hypothyroidism due to acquired atrophy of thyroid 2/18/2016     Ischemic cardiomyopathy      LBP (low back pain) 11/21/2014     Microalbuminuria 11/7/2016    X1     Other abnormal glucose 5/8/2014     Panlobular emphysema (H) 9/22/2016     Presbycusis syndrome, bilateral 9/22/2016     Right-sided low back pain without sciatica, unspecified chronicity 3/16/2017     Tobacco use disorder 7/19/2006     Tubular adenoma        Past Surgical History:   Procedure Laterality Date     ANGIOPLASTY  1998     COLONOSCOPY N/A 11/17/2016    Procedure: COLONOSCOPY;  Surgeon: Adam Lantigua MD;  Location:  GI       Family History   Problem Relation Age of Onset     Cardiovascular Father      heart attack     Coronary Artery Disease Father      CEREBROVASCULAR DISEASE Father      Cardiovascular Brother      heart attack     Cardiovascular Sister      2 sisters - minor cardiac problems- pt unsure of the exact nature       Social History   Substance Use Topics     Smoking status: Current Some Day Smoker     Packs/day: 0.25     Years: 40.00     Types: Cigarettes     Smokeless tobacco: Never Used     Alcohol use No       Reviewed and  "updated as needed this visit by Provider            ROS:  C: NEGATIVE for fever, chills, change in weight  I: NEGATIVE for worrisome rashes, moles or lesions  E: NEGATIVE for vision changes or irritation   ENT: NEGATIVE for ear, mouth and throat problems  R: NEGATIVE for SOB occasional cough  CV: NEGATIVE for chest pain  GI: Positive for occasional constipation NEGATIVE for heartburn or diarrhea   male: Nocturia- 1x/night, Negative for urinary frequency  N: NEGATIVE for weakness, dizziness or paresthesias    This document serves as a record of the services and decisions personally performed and made by Rigo Goddard MD. It was created on his behalf by Zunilda Nichols, a trained medical scribe.  The creation of this document is based on the scribe's personal observations and the provider's statements to the medical scribe.  Zunilda Nichols, September 25, 2017 8:36 AM    OBJECTIVE:   /83 (BP Location: Left arm, Patient Position: Chair, Cuff Size: Adult Regular)  Pulse 58  Temp 98.7  F (37.1  C) (Oral)  Resp 14  Ht 5' 6\" (1.676 m)  Wt 176 lb (79.8 kg)  BMI 28.41 kg/m2    EXAM:  GENERAL: healthy, alert and no distress  EYES: Eyes grossly normal to inspection, PERRL and conjunctivae and sclerae normal  HENT: ear canals and TM's normal, nose and mouth without ulcers or lesions  NECK: no adenopathy, no asymmetry, masses, or scars and thyroid normal to palpation  RESP: lungs clear to auscultation - no rales, rhonchi or wheezes  CV: regular rate and rhythm, normal S1 S2, no S3 or S4, no murmur, click or rub, no peripheral edema and peripheral pulses strong  ABDOMEN: soft, nontender, no hepatosplenomegaly, no masses and bowel sounds normal  MS: no gross musculoskeletal defects noted, no edema  FOOT EXAM: Feet are warm and dry. Pedal pulses palpable. No LE edema. Skin intact.  SKIN: Typical seborrheic keratoses, blue subcutaneous pigment on head, pt attributes to childhood trauma  PSYCH: mentation appears normal, " affect normal/bright    ASSESSMENT/PLAN:   ASSESSMENT / PLAN:  (Z00.00) Routine general medical examination at a health care facility  (primary encounter diagnosis)  Comment: On a statin  Plan: Lipid panel reflex to direct LDL            (E11.9) Type 2 diabetes mellitus without complication, without long-term current use of insulin (H)  Comment: Assess  Plan: Hemoglobin A1c, Comprehensive metabolic panel,         Lipid panel reflex to direct LDL, Albumin         Random Urine Quantitative with Creat Ratio, C         FOOT EXAM  NO CHARGE            (J43.1) Panlobular emphysema (H)  Comment: refill  Plan: tiotropium (SPIRIVA HANDIHALER) 18 MCG capsule        Smoking cessation urged                (E03.4) Hypothyroidism due to acquired atrophy of thyroid  Comment: refill assess yearly  Plan: TSH with free T4 reflex, levothyroxine         (SYNTHROID/LEVOTHROID) 88 MCG tablet            (F17.200) Tobacco use disorder  Comment: not interested in cessation at this time       (E78.5) Hyperlipidemia LDL goal <100  Comment: refill  Plan: atorvastatin (LIPITOR) 40 MG tablet            (N52.9) Impotence of organic origin  Comment: [place] [facility]  Plan: sildenafil (VIAGRA) 100 MG tablet            (I10) Essential hypertension with goal blood pressure less than 140/90  Comment:  Controlled refill  Plan: metoprolol (TOPROL-XL) 50 MG 24 hr tablet,         lisinopril (PRINIVIL/ZESTRIL) 5 MG tablet            (I25.2) History of myocardial infarction in adulthood  Comment:  Discussed options he is unable to perform a treadmill test and not interested in pharmacologic  Stress tests referred  Plan: CARDIOLOGY EVAL ADULT REFERRAL              RTC twice yearly      COUNSELING:   Reviewed preventive health counseling, as reflected in patient instructions       Prostate cancer screening       reports that he has been smoking Cigarettes.  He has a 10.00 pack-year smoking history. He has never used smokeless tobacco.  Tobacco Cessation Action  "Plan: Information offered: Patient not interested at this time  Estimated body mass index is 28.41 kg/(m^2) as calculated from the following:    Height as of this encounter: 5' 6\" (1.676 m).    Weight as of this encounter: 176 lb (79.8 kg).   Weight management plan: Discussed healthy diet and exercise guidelines and patient will follow up in 12 months in clinic to re-evaluate.    Counseling Resources:  ATP IV Guidelines  Pooled Cohorts Equation Calculator  FRAX Risk Assessment  ICSI Preventive Guidelines  Dietary Guidelines for Americans, 2010  USDA's MyPlate  ASA Prophylaxis  Lung CA Screening    Rigo Goddard MD  [facility]  Answers for HPI/ROS submitted by the patient on 9/25/2017   PHQ-2 Score: 0  The information in this document, created by the medical scribe for me, accurately reflects the services I personally performed and the decisions made by me. I have reviewed and approved this document for accuracy prior to leaving the patient care area.  Rigo Goddard MD September 25, 2017 8:36 AM    "

## 2017-09-25 NOTE — MR AVS SNAPSHOT
After Visit Summary   9/25/2017    Rubio Gibbons    MRN: [id number]           Patient Information     Date Of Birth          1945        Visit Information        Provider Department      9/25/2017 8:30 AM Rigo Goddard MD [facility]        Today's Diagnoses     Routine general medical examination at a health care facility    -  1    Muscle spasm        Hypothyroidism due to acquired atrophy of thyroid        Tobacco use disorder        Type 2 diabetes mellitus without complication, without long-term current use of insulin (H)        Hyperlipidemia LDL goal <100        Impotence of organic origin        Essential hypertension with goal blood pressure less than 140/90        Panlobular emphysema (H)        History of myocardial infarction in adulthood          Care Instructions      Preventive Health Recommendations:   Male Ages 65 and over    Yearly exam:             See your health care provider every year in order to  o   Review health changes.   o   Discuss preventive care.    o   Review your medicines if your doctor has prescribed any.    Talk with your health care provider about whether you should have a test to screen for prostate cancer (PSA).    Every 3 years, have a diabetes test (fasting glucose). If you are at risk for diabetes, you should have this test more often.    Every 5 years, have a cholesterol test. Have this test more often if you are at risk for high cholesterol or heart disease.     Every 10 years, have a colonoscopy. Or, have a yearly FIT test (stool test). These exams will check for colon cancer.    Talk to with your health care provider about screening for Abdominal Aortic Aneurysm if you have a family history of AAA or have a history of smoking.    Shots:     Get a flu shot each year.     Get a tetanus shot every 10 years.     Talk to your doctor about your pneumonia vaccines. There are now two you should receive - Pneumovax (PPSV 23) and Prevnar (PCV  13).     Talk to your doctor about a shingles vaccine.     Talk to your doctor about the hepatitis B vaccine.  Nutrition:     Eat at least 5 servings of fruits and vegetables each day.     Eat whole-grain bread, whole-wheat pasta and brown rice instead of white grains and rice.     Talk to your provider about Calcium and Vitamin D.   Lifestyle    Exercise for at least 150 minutes a week (30 minutes a day, 5 days a week). This will help you control your weight and prevent disease.     Limit alcohol to one drink per day.     No smoking.     Wear sunscreen to prevent skin cancer.     See your dentist every six months for an exam and cleaning.     See your eye doctor every 1 to 2 years to screen for conditions such as glaucoma, macular degeneration, cataracts, etc       [facility]          Follow-ups after your visit        Additional Services     CARDIOLOGY EVAL ADULT REFERRAL       Your provider has referred you to:  [facility]: [facility] [phone]   [email or web address]    Please be aware that coverage of these services is subject to the terms and limitations of your health insurance plan.  Call member services at your health plan with any benefit or coverage questions.      Type of Referral:  Cardiology Follow Up    Timeframe requested:  Within 1 month    Please bring the following to your appointment:  >>   Any x-rays, CTs or MRIs which have been performed.  Contact the facility where they were done to arrange for  prior to your scheduled appointment.    >>   List of current medications  >>   This referral request   >>   Any documents/labs given to you for this referral                  Who to contact     If you have questions or need follow up information about today's clinic visit or your schedule please contact [facility] directly at [phone].  Normal or non-critical lab and imaging  "results will be communicated to you by MyChart, letter or phone within 4 business days after the clinic has received the results. If you do not hear from us within 7 days, please contact the clinic through [employer] or phone. If you have a critical or abnormal lab result, we will notify you by phone as soon as possible.  Submit refill requests through [employer] or call your pharmacy and they will forward the refill request to us. Please allow 3 business days for your refill to be completed.          Additional Information About Your Visit        SpaceFace Information     SpaceFace lets you send messages to your doctor, view your test results, renew your prescriptions, schedule appointments and more. To sign up, go to [email or web address] . Click on \"Log in\" on the left side of the screen, which will take you to the Welcome page. Then click on \"Sign up Now\" on the right side of the page.     You will be asked to enter the access code listed below, as well as some personal information. Please follow the directions to create your username and password.     Your access code is: [email or web address]  Expires: 10/1/2017  9:19 AM     Your access code will  in 90 days. If you need help or a new code, please call your [facility] clinic or [phone].        Care EveryWhere ID     This is your Care EveryWhere ID. This could be used by other organizations to access your [facility] medical records  [id number]        Your Vitals Were     Pulse Temperature Respirations Height BMI (Body Mass Index)       58 98.7  F (37.1  C) (Oral) 14 5' 6\" (1.676 m) 28.41 kg/m2        Blood Pressure from Last 3 Encounters:   17 139/83   17 136/72   17 126/74    Weight from Last 3 Encounters:   17 176 lb (79.8 kg)   17 175 lb (79.4 kg)   17 186 lb (84.4 kg)              We Performed the Following     Albumin Random Urine Quantitative with Creat Ratio     C FOOT EXAM  NO CHARGE     CARDIOLOGY EVAL ADULT REFERRAL     " Comprehensive metabolic panel     Hemoglobin A1c     Lipid panel reflex to direct LDL     TSH with free T4 reflex          Today's Medication Changes          These changes are accurate as of: 9/25/17  8:57 AM.  If you have any questions, ask your nurse or doctor.               These medicines have changed or have updated prescriptions.        Dose/Directions    metoprolol 50 MG 24 hr tablet   Commonly known as:  TOPROL-XL   This may have changed:  See the new instructions.   Used for:  Essential hypertension with goal blood pressure less than 140/90   Changed by:  Rigo Goddard MD        TAKE 1 TABLET(50 MG) BY MOUTH DAILY   Quantity:  90 tablet   Refills:  3       sildenafil 100 MG tablet   Commonly known as:  VIAGRA   This may have changed:  See the new instructions.   Used for:  Impotence of organic origin   Changed by:  Rigo Goddard MD        TAKE ONE TABLET BY MOUTH DAILY 30 MINUTES TO 4 HOURS BEFORE INTERCOURSE AS NEEDED. NEVER USE WITH NITROGLYCERIN, TERAZOSIN OR DOXAZOSIN   Quantity:  8 tablet   Refills:  11         Stop taking these medicines if you haven't already. Please contact your care team if you have questions.     buPROPion 150 MG 12 hr tablet   Commonly known as:  WELLBUTRIN SR   Stopped by:  Rigo Goddard MD                Where to get your medicines      These medications were sent to [facility] [address] [place] [id number]    Hours:  24-hours Phone:  [phone]     atorvastatin 40 MG tablet    levothyroxine 88 MCG tablet    lisinopril 5 MG tablet    metoprolol 50 MG 24 hr tablet    tiotropium 18 MCG capsule         Some of these will need a paper prescription and others can be bought over the counter.  Ask your nurse if you have questions.     Bring a paper prescription for each of these medications     sildenafil 100 MG tablet                Primary Care Provider Office Phone # Fax #     Rigo Goddard [phone]       [address] [facility] [address]        Equal Access to Services     IRMATAMMIE TOM : Antony zully hook john Cardenasali, sunita lindseyaniha, aisf kakarda osman, niko fregosoarmando hellen. So [facility] [phone].    ATENCIÓN: Si habla español, tiene a cervantes disposición servicios gratuitos de asistencia lingüística. Llame al [phone].    We comply with applicable federal civil rights laws and [place] laws. We do not discriminate on the basis of race, color, national origin, age, disability sex, sexual orientation or gender identity.            Thank you!     Thank you for choosing [facility]  for your care. Our goal is always to provide you with excellent care. Hearing back from our patients is one way we can continue to improve our services. Please take a few minutes to complete the written survey that you may receive in the mail after your visit with us. Thank you!             Your Updated Medication List - Protect others around you: Learn how to safely use, store and throw away your medicines at www.disposemymeds.org.          This list is accurate as of: 9/25/17  8:57 AM.  Always use your most recent med list.                   Brand Name Dispense Instructions for use Diagnosis    aspirin 81 MG tablet     100 tablet    Take 1 tablet (81 mg) by mouth daily    History of myocardial infarction in adulthood       atorvastatin 40 MG tablet    LIPITOR    90 tablet    Take 1 tablet (40 mg) by mouth daily    Hyperlipidemia LDL goal <100       levothyroxine 88 MCG tablet    SYNTHROID/LEVOTHROID    90 tablet    Take 1 tablet (88 mcg) by mouth daily    Hypothyroidism due to acquired atrophy of thyroid       lisinopril 5 MG tablet    PRINIVIL/ZESTRIL    90 tablet    Take 1 tablet (5 mg) by mouth daily    Essential hypertension with goal blood pressure less than 140/90       metoprolol 50 MG 24 hr tablet    TOPROL-XL    90 tablet     TAKE 1 TABLET(50 MG) BY MOUTH DAILY    Essential hypertension with goal blood pressure less than 140/90       sildenafil 100 MG tablet    VIAGRA    8 tablet    TAKE ONE TABLET BY MOUTH DAILY 30 MINUTES TO 4 HOURS BEFORE INTERCOURSE AS NEEDED. NEVER USE WITH NITROGLYCERIN, TERAZOSIN OR DOXAZOSIN    Impotence of organic origin       tiotropium 18 MCG capsule    SPIRIVA HANDIHALER    90 capsule    Inhale contents of one capsule daily.    Panlobular emphysema (H)

## 2017-09-26 LAB
ALBUMIN SERPL-MCNC: 3.6 G/DL (ref 3.4–5)
ALP SERPL-CCNC: 74 U/L (ref 40–150)
ALT SERPL W P-5'-P-CCNC: 19 U/L (ref 0–70)
ANION GAP SERPL CALCULATED.3IONS-SCNC: 11 MMOL/L (ref 3–14)
AST SERPL W P-5'-P-CCNC: 18 U/L (ref 0–45)
BILIRUB SERPL-MCNC: 0.7 MG/DL (ref 0.2–1.3)
BUN SERPL-MCNC: 17 MG/DL (ref 7–30)
CALCIUM SERPL-MCNC: 9.2 MG/DL (ref 8.5–10.1)
CHLORIDE SERPL-SCNC: 105 MMOL/L (ref 94–109)
CHOLEST SERPL-MCNC: 138 MG/DL
CO2 SERPL-SCNC: 25 MMOL/L (ref 20–32)
CREAT SERPL-MCNC: 1.01 MG/DL (ref 0.66–1.25)
GFR SERPL CREATININE-BSD FRML MDRD: 72 ML/MIN/1.7M2
GLUCOSE SERPL-MCNC: 131 MG/DL (ref 70–99)
HDLC SERPL-MCNC: 35 MG/DL
LDLC SERPL CALC-MCNC: 71 MG/DL
NONHDLC SERPL-MCNC: 103 MG/DL
POTASSIUM SERPL-SCNC: 4.9 MMOL/L (ref 3.4–5.3)
PROT SERPL-MCNC: 6.9 G/DL (ref 6.8–8.8)
SODIUM SERPL-SCNC: 141 MMOL/L (ref 133–144)
TRIGL SERPL-MCNC: 161 MG/DL
TSH SERPL DL<=0.005 MIU/L-ACNC: 1.62 MU/L (ref 0.4–4)

## 2017-10-19 DIAGNOSIS — I25.2 HISTORY OF MYOCARDIAL INFARCTION IN ADULTHOOD: ICD-10-CM

## 2017-10-20 NOTE — TELEPHONE ENCOUNTER
ASPIRIN LOW DOSE 81 MG EC tablet  Last Written Prescription Date: 9/22/16  Last Fill Quantity: 100,  # refills: 3   Last Office Visit with G, P or [facility] prescribing provider:  9/25/2017                                              AIXA Raymond  October 20, 2017  11:02 AM

## 2017-10-23 RX ORDER — ASPIRIN 81 MG
TABLET, DELAYED RELEASE (ENTERIC COATED) ORAL
Qty: 100 TABLET | Refills: 1 | Status: ON HOLD | OUTPATIENT
Start: 2017-10-23 | End: 2019-10-30

## 2017-10-23 NOTE — TELEPHONE ENCOUNTER
Prescription approved per [facility] Refill Protocol.    Rianna SAGE RN, BSN, PHN  [place] [facility] RN

## 2017-11-16 DIAGNOSIS — E78.5 HYPERLIPIDEMIA LDL GOAL <100: ICD-10-CM

## 2017-11-16 DIAGNOSIS — E03.4 HYPOTHYROIDISM DUE TO ACQUIRED ATROPHY OF THYROID: ICD-10-CM

## 2017-11-16 NOTE — TELEPHONE ENCOUNTER
Medication Detail      Disp Refills Start End SHANA   levothyroxine (SYNTHROID/LEVOTHROID) 88 MCG tablet 90 tablet 3 9/25/2017  No   Sig: Take 1 tablet (88 mcg) by mouth daily   Class: E-Prescribe     ________________________________________________________________________________       Disp Refills Start End SHANA   atorvastatin (LIPITOR) 40 MG tablet 90 tablet 3 9/25/2017  No   Sig: Take 1 tablet (40 mg) by mouth daily   Class: E-Prescribe       Last Office Visit with RADHA, RAFAELA or [facility] prescribing provider: 9/25/2017

## 2017-11-20 RX ORDER — LEVOTHYROXINE SODIUM 88 UG/1
TABLET ORAL
Status: SHIPPED
Start: 2017-11-20 | End: 2019-02-01

## 2017-11-20 RX ORDER — ATORVASTATIN CALCIUM 40 MG/1
TABLET, FILM COATED ORAL
Status: SHIPPED
Start: 2017-11-20 | End: 2019-02-01

## 2017-11-20 NOTE — TELEPHONE ENCOUNTER
Has current RX for a year sent 6/25/17.  Receipt confirmed by pharmacy (9/25/2017  8:54 AM CDT)    Monique Brown RN

## 2018-05-13 DIAGNOSIS — I10 ESSENTIAL HYPERTENSION WITH GOAL BLOOD PRESSURE LESS THAN 140/90: ICD-10-CM

## 2018-05-14 NOTE — TELEPHONE ENCOUNTER
"Requested Prescriptions   Pending Prescriptions Disp Refills     lisinopril (PRINIVIL/ZESTRIL) 5 MG tablet [Pharmacy Med Name: LISINOPRIL 5MG TABLETS]  Last Written Prescription Date:  9/25/2017  Last Fill Quantity: 90 tablet,  # refills: 1   Last office visit: 9/25/2017 with prescribing provider:  Nitza    90 tablet 0     Sig: TAKE 1 TABLET(5 MG) BY MOUTH DAILY    ACE Inhibitors (Including Combos) Protocol Passed    5/13/2018  6:19 PM       Passed - Blood pressure under 140/90 in past 12 months    BP Readings from Last 3 Encounters:   09/25/17 139/83   07/03/17 136/72   03/16/17 126/74          Passed - Recent (12 mo) or future (30 days) visit within the authorizing provider's specialty    Patient had office visit in the last 12 months or has a visit in the next 30 days with authorizing provider or within the authorizing provider's specialty.  See \"Patient Info\" tab in inbasket, or \"Choose Columns\" in Meds & Orders section of the refill encounter.           Passed - Patient is age 18 or older       Passed - Normal serum creatinine on file in past 12 months    Recent Labs   Lab Test  09/25/17   0857   CR  1.01            Passed - Normal serum potassium on file in past 12 months    Recent Labs   Lab Test  09/25/17   0857   POTASSIUM  4.9               "

## 2018-05-15 RX ORDER — LISINOPRIL 5 MG/1
TABLET ORAL
Qty: 90 TABLET | Refills: 0 | Status: SHIPPED | OUTPATIENT
Start: 2018-05-15 | End: 2018-08-08

## 2018-07-23 ENCOUNTER — TRANSFERRED RECORDS (OUTPATIENT)
Dept: HEALTH INFORMATION MANAGEMENT | Facility: CLINIC | Age: 73
End: 2018-07-23

## 2018-07-26 ENCOUNTER — OFFICE VISIT (OUTPATIENT)
Dept: FAMILY MEDICINE | Facility: CLINIC | Age: 73
End: 2018-07-26
Payer: COMMERCIAL

## 2018-07-26 ENCOUNTER — TELEPHONE (OUTPATIENT)
Dept: FAMILY MEDICINE | Facility: CLINIC | Age: 73
End: 2018-07-26

## 2018-07-26 VITALS
OXYGEN SATURATION: 96 % | TEMPERATURE: 98.1 F | SYSTOLIC BLOOD PRESSURE: 135 MMHG | HEART RATE: 64 BPM | DIASTOLIC BLOOD PRESSURE: 82 MMHG | BODY MASS INDEX: 28.86 KG/M2 | WEIGHT: 178.8 LBS

## 2018-07-26 DIAGNOSIS — I71.40 ABDOMINAL AORTIC ANEURYSM (AAA) WITHOUT RUPTURE (H): ICD-10-CM

## 2018-07-26 DIAGNOSIS — M54.50 MIDLINE LOW BACK PAIN WITHOUT SCIATICA, UNSPECIFIED CHRONICITY: Primary | ICD-10-CM

## 2018-07-26 DIAGNOSIS — I10 HTN, GOAL BELOW 140/90: ICD-10-CM

## 2018-07-26 DIAGNOSIS — E11.9 TYPE 2 DIABETES MELLITUS WITHOUT COMPLICATION, WITHOUT LONG-TERM CURRENT USE OF INSULIN (H): ICD-10-CM

## 2018-07-26 DIAGNOSIS — E03.4 HYPOTHYROIDISM DUE TO ACQUIRED ATROPHY OF THYROID: ICD-10-CM

## 2018-07-26 LAB
ALBUMIN SERPL-MCNC: 3.8 G/DL (ref 3.4–5)
ALP SERPL-CCNC: 82 U/L (ref 40–150)
ALT SERPL W P-5'-P-CCNC: 12 U/L (ref 0–70)
ANION GAP SERPL CALCULATED.3IONS-SCNC: 3 MMOL/L (ref 3–14)
AST SERPL W P-5'-P-CCNC: 15 U/L (ref 0–45)
BILIRUB SERPL-MCNC: 0.4 MG/DL (ref 0.2–1.3)
BUN SERPL-MCNC: 15 MG/DL (ref 7–30)
CALCIUM SERPL-MCNC: 8.6 MG/DL (ref 8.5–10.1)
CHLORIDE SERPL-SCNC: 106 MMOL/L (ref 94–109)
CHOLEST SERPL-MCNC: 130 MG/DL
CO2 SERPL-SCNC: 30 MMOL/L (ref 20–32)
CREAT SERPL-MCNC: 0.91 MG/DL (ref 0.66–1.25)
GFR SERPL CREATININE-BSD FRML MDRD: 81 ML/MIN/1.7M2
GLUCOSE SERPL-MCNC: 148 MG/DL (ref 70–99)
HBA1C MFR BLD: 7.3 % (ref 0–5.6)
HDLC SERPL-MCNC: 34 MG/DL
LDLC SERPL CALC-MCNC: 72 MG/DL
NONHDLC SERPL-MCNC: 96 MG/DL
POTASSIUM SERPL-SCNC: 4.7 MMOL/L (ref 3.4–5.3)
PROT SERPL-MCNC: 7 G/DL (ref 6.8–8.8)
SODIUM SERPL-SCNC: 139 MMOL/L (ref 133–144)
TRIGL SERPL-MCNC: 121 MG/DL
TSH SERPL DL<=0.005 MIU/L-ACNC: 2.19 MU/L (ref 0.4–4)

## 2018-07-26 PROCEDURE — 84443 ASSAY THYROID STIM HORMONE: CPT | Performed by: FAMILY MEDICINE

## 2018-07-26 PROCEDURE — 99214 OFFICE O/P EST MOD 30 MIN: CPT | Performed by: FAMILY MEDICINE

## 2018-07-26 PROCEDURE — 83036 HEMOGLOBIN GLYCOSYLATED A1C: CPT | Performed by: FAMILY MEDICINE

## 2018-07-26 PROCEDURE — 36415 COLL VENOUS BLD VENIPUNCTURE: CPT | Performed by: FAMILY MEDICINE

## 2018-07-26 PROCEDURE — 80061 LIPID PANEL: CPT | Performed by: FAMILY MEDICINE

## 2018-07-26 PROCEDURE — 80053 COMPREHEN METABOLIC PANEL: CPT | Performed by: FAMILY MEDICINE

## 2018-07-26 RX ORDER — MELOXICAM 15 MG/1
15 TABLET ORAL DAILY
Qty: 30 TABLET | Refills: 1 | Status: SHIPPED | OUTPATIENT
Start: 2018-07-26 | End: 2018-09-23

## 2018-07-26 NOTE — LETTER
July 27, 2018      Rubio Gibbons  [address] [facility] [address]        Dear ,    We are writing to inform you of your test results.    Diabetes is creeping up.  We should talk about medicines when you get done with your back   ROLAND BRAXTON     Resulted Orders   Comprehensive metabolic panel   Result Value Ref Range    Sodium 139 133 - 144 mmol/L    Potassium 4.7 3.4 - 5.3 mmol/L    Chloride 106 94 - 109 mmol/L    Carbon Dioxide 30 20 - 32 mmol/L    Anion Gap 3 3 - 14 mmol/L    Glucose 148 (H) 70 - 99 mg/dL    Urea Nitrogen 15 7 - 30 mg/dL    Creatinine 0.91 0.66 - 1.25 mg/dL    GFR Estimate 81 >60 mL/min/1.7m2      Comment:      Non  GFR Calc    GFR Estimate If Black >90 >60 mL/min/1.7m2      Comment:       GFR Calc    Calcium 8.6 8.5 - 10.1 mg/dL    Bilirubin Total 0.4 0.2 - 1.3 mg/dL    Albumin 3.8 3.4 - 5.0 g/dL    Protein Total 7.0 6.8 - 8.8 g/dL    Alkaline Phosphatase 82 40 - 150 U/L    ALT 12 0 - 70 U/L    AST 15 0 - 45 U/L   Hemoglobin A1c   Result Value Ref Range    Hemoglobin A1C 7.3 (H) 0 - 5.6 %      Comment:      Normal <5.7% Prediabetes 5.7-6.4%  Diabetes 6.5% or higher - adopted from ADA   consensus guidelines.     Lipid panel reflex to direct LDL Non-fasting   Result Value Ref Range    Cholesterol 130 <200 mg/dL    Triglycerides 121 <150 mg/dL    HDL Cholesterol 34 (L) >39 mg/dL    LDL Cholesterol Calculated 72 <100 mg/dL      Comment:      Desirable:       <100 mg/dl    Non HDL Cholesterol 96 <130 mg/dL   TSH with free T4 reflex   Result Value Ref Range    TSH 2.19 0.40 - 4.00 mU/L       If you have any questions or concerns, please call the clinic at the number listed above.       Sincerely,        Roland Braxton MD/FRANKO

## 2018-07-26 NOTE — TELEPHONE ENCOUNTER
Please submit PA for        Disp Refills Start End SHANA   diclofenac (VOLTAREN) 1 % GEL topical gel 100 g 1 7/26/2018  --   Sig - Route: Place 4 g onto the skin 4 times daily - Transdermal     Loraine Pantoja RN, BS  Clinical Nurse Triage.

## 2018-07-26 NOTE — TELEPHONE ENCOUNTER
Wife calls, CTC on file,  informs insurance does cover diclofenac gel, wonders why pharmacy did not explain this, informed insurances often do not cover, can check with insurance what might be covered, wife hung up on me, called [facility] pharmacy, they informed her the same thing, not covered by insurance, routed to , please advise, ? OTC salon pas patches, inform them of plan  Yun Washington RN, BSN  Message handled by Nurse Triage.

## 2018-07-26 NOTE — TELEPHONE ENCOUNTER
He has oral agent  Our computer formulary says this is tier 1 preferred  I have no opinion re lisset Goddard

## 2018-07-26 NOTE — PROGRESS NOTES
SUBJECTIVE:   Rubio Gibbons is a 73 year old male who presents to clinic today for the following health issues:      Joint Pain    Onset:7/23/18    Description:   Location: right hip  Character: Sharp and throbbing     Intensity: moderate, severe    Progression of Symptoms: better    Accompanying Signs & Symptoms:  Other symptoms: radiation of pain to right leg     History:   Previous similar pain: no       Precipitating factors:   Trauma or overuse: no     Alleviating factors:  Improved by: nothing    Therapies Tried and outcome: ASA ibuprofen chiropractic. He was seen in [facility] last PM, given an ineffective unremembered Rx. Imaging shows deg discs, spondylolisthesis, facet joint OA    Pt describes 1 week of pain midline. No radiation to me. Right sciatica to staff.      HTN, goal below 140/90   BP Readings from Last 1 Encounters:   07/26/18 135/82       Hypothyroidism due to acquired atrophy of thyroid   TSH   Date Value Ref Range Status   09/25/2017 1.62 0.40 - 4.00 mU/L Final       Type 2 diabetes mellitus without complication, without long-term current use of insulin (H) diet controlled   Lab Results   Component Value Date    A1C 7.3 07/26/2018    A1C 6.7 09/25/2017    A1C 6.9 03/16/2017    A1C 6.5 09/22/2016    A1C 6.1 05/08/2014       Abdominal aortic aneurysm (AAA) without rupture (H) stable last year          Problem list and histories reviewed & adjusted, as indicated.  Additional history:         Reviewed and updated as needed this visit by clinical staff       Reviewed and updated as needed this visit by Provider         Past Medical History:   Diagnosis Date     Abdominal aortic aneurysm (AAA) without rupture (H) 10/13/2016     CAD (coronary artery disease)      Diabetes mellitus type 2, uncomplicated (H) 9/22/2016    Diet      WIN (dyspnea on exertion) 11/21/2014     History of myocardial infarction in adulthood 9/22/2016     HTN, goal below 140/90 1/28/2014     Hyperlipidemia LDL goal <100  10/31/2010     Hypothyroidism 11/9/2011     Hypothyroidism due to acquired atrophy of thyroid 2/18/2016     Ischemic cardiomyopathy      LBP (low back pain) 11/21/2014     Microalbuminuria 11/7/2016    X1     Muscle spasm 9/25/2017     Other abnormal glucose 5/8/2014     Panlobular emphysema (H) 9/22/2016     Presbycusis syndrome, bilateral 9/22/2016     Right-sided low back pain without sciatica, unspecified chronicity 3/16/2017     Tobacco use disorder 7/19/2006     Tubular adenoma    He denies a history of LBP. Also notes PT visit for LBP years ago, periodic chiropractic    Past Surgical History:   Procedure Laterality Date     ANGIOPLASTY  1998     COLONOSCOPY N/A 11/17/2016    Procedure: COLONOSCOPY;  Surgeon: Adam Lantigua MD;  Location:  GI       Family History   Problem Relation Age of Onset     Cardiovascular Father      heart attack     Coronary Artery Disease Father      Cerebrovascular Disease Father      Cardiovascular Brother      heart attack     Cardiovascular Sister      2 sisters - minor cardiac problems- pt unsure of the exact nature       Social History   Substance Use Topics     Smoking status: Current Some Day Smoker     Packs/day: 0.25     Years: 40.00     Types: Cigarettes     Smokeless tobacco: Never Used     Alcohol use No      ROS no GI  symptoms  /82 (BP Location: Left arm, Patient Position: Chair, Cuff Size: Adult Regular)  Pulse 64  Temp 98.1  F (36.7  C) (Oral)  Wt 178 lb 12.8 oz (81.1 kg)  SpO2 96%  BMI 28.86 kg/m2  BACK: Able to flex to 45 degrees, normal heel toe, negative SLR.    ASSESSMENT / PLAN:  (M54.5) Midline low back pain without sciatica, unspecified chronicity  (primary encounter diagnosis)  Comment: recurrent  Plan: [facility] PT, HAND, AND CHIROPRACTIC REFERRAL,         diclofenac (VOLTAREN) 1 % GEL topical gel,         meloxicam (MOBIC) 15 MG tablet        discussed pathophysiology dirk hx    (I10) HTN, goal below 140/90  Comment:controlled  Plan:  Comprehensive metabolic panel            (E03.4) Hypothyroidism due to acquired atrophy of thyroid  Comment: assess yearly due  Plan: TSH with free T4 reflex            (E11.9) Type 2 diabetes mellitus without complication, without long-term current use of insulin (H)  Comment: assess periodically due  Plan: Comprehensive metabolic panel, Hemoglobin A1c,         Lipid panel reflex to direct LDL Non-fasting,         TSH with free T4 reflex, CANCELED: Albumin         Random Urine Quantitative with Creat Ratio            (I71.4) Abdominal aortic aneurysm (AAA) without rupture (H)  Comment: due again  Plan: US Abdominal Aorta Imaging             RTC post PT          Rigo Goddard MD

## 2018-07-26 NOTE — MR AVS SNAPSHOT
After Visit Summary   7/26/2018    Rubio Gibbons    MRN: [id number]           Patient Information     Date Of Birth          1945        Visit Information        Provider Department      7/26/2018 9:15 AM Rigo Goddard MD [facility]        Today's Diagnoses     Midline low back pain without sciatica, unspecified chronicity    -  1    HTN, goal below 140/90        Hypothyroidism due to acquired atrophy of thyroid        Type 2 diabetes mellitus without complication, without long-term current use of insulin (H)        Abdominal aortic aneurysm (AAA) without rupture (H)           Follow-ups after your visit        Additional Services     [facility] PT, HAND, AND CHIROPRACTIC REFERRAL       **This order will print in the [facility] Scheduling Office**    Physical Therapy, Hand Therapy and Chiropractic Care are available through:    *[facility]  *[facility]  *[facility]    Call one number to schedule at any of the above locations: [phone].    Your provider has referred you to: As Indicated:     Indication/Reason for Referral: Low Back Pain  Onset of Illness: 1 week  Therapy Orders: Evaluate and Treat  Special Programs: None and Walk in Spine  Special Request:     Saniya Kothari      Additional Comments for the Therapist or Chiropractor:     Please be aware that coverage of these services is subject to the terms and limitations of your health insurance plan.  Call member services at your health plan with any benefit or coverage questions.      Please bring the following to your appointment:    *Your personal calendar for scheduling future appointments  *Comfortable clothing                  Follow-up notes from your care team     Return in about 4 weeks (around 8/23/2018).      Your next 10 appointments already scheduled     Aug 02, 2018 12:10 PM CDT   (Arrive by 11:55 AM)   [facility] Spine with Katy Mcnally, PT   [facility] PT ([facility]  [place]  )    [address] [address]  [facility] [address]   [phone]              Future tests that were ordered for you today     Open Future Orders        Priority Expected Expires Ordered    US Abdominal Aorta Imaging Routine  7/26/2019 7/26/2018            Who to contact     If you have questions or need follow up information about today's clinic visit or your schedule please contact [facility] directly at [phone].  Normal or non-critical lab and imaging results will be communicated to you by MyChart, letter or phone within 4 business days after the clinic has received the results. If you do not hear from us within 7 days, please contact the clinic through [employer] or phone. If you have a critical or abnormal lab result, we will notify you by phone as soon as possible.  Submit refill requests through [employer] or call your pharmacy and they will forward the refill request to us. Please allow 3 business days for your refill to be completed.          Additional Information About Your Visit        Care EveryWhere ID     This is your Care EveryWhere ID. This could be used by other organizations to access your [facility] medical records  [id number]        Your Vitals Were     Pulse Temperature Pulse Oximetry BMI (Body Mass Index)          64 98.1  F (36.7  C) (Oral) 96% 28.86 kg/m2         Blood Pressure from Last 3 Encounters:   07/26/18 135/82   09/25/17 139/83   07/03/17 136/72    Weight from Last 3 Encounters:   07/26/18 178 lb 12.8 oz (81.1 kg)   09/25/17 176 lb (79.8 kg)   07/03/17 175 lb (79.4 kg)              We Performed the Following     Comprehensive metabolic panel     Hemoglobin A1c     [facility] PT, HAND, AND CHIROPRACTIC REFERRAL     Lipid panel reflex to direct LDL Non-fasting     TSH with free T4 reflex          Today's Medication Changes          These changes are accurate as of 7/26/18 12:59 PM.  If you have any questions, ask your nurse or doctor.               Start  taking these medicines.        Dose/Directions    diclofenac 1 % Gel topical gel   Commonly known as:  VOLTAREN   Used for:  Midline low back pain without sciatica, unspecified chronicity   Started by:  Rigo Goddard MD        Dose:  4 g   Place 4 g onto the skin 4 times daily   Quantity:  100 g   Refills:  1       meloxicam 15 MG tablet   Commonly known as:  MOBIC   Used for:  Midline low back pain without sciatica, unspecified chronicity   Started by:  Rigo Goddard MD        Dose:  15 mg   Take 1 tablet (15 mg) by mouth daily   Quantity:  30 tablet   Refills:  1            Where to get your medicines      [facility] [address] [place] [address] [id number] [place] [address] AT [place] [address]  [id number] [place] [id number]     Phone:  [phone]     diclofenac 1 % Gel topical gel    meloxicam 15 MG tablet                Primary Care Provider Office Phone # Fax #    Rigo Goddard [phone] [address] [facility] [address]        Equal Access to Services     [facility]: Hadii zully ku hadasho Soomaali, waaxda luqadaha, qaybta kaalmada adeegyada, waxay gerry childress . So [facility] [phone].    ATENCIÓN: Si habla español, tiene a cervantes disposición servicios gratuitos de asistencia lingüística. [facility] [phone].    We comply with applicable federal civil rights laws and [place] laws. We do not discriminate on the basis of race, color, national origin, age, disability, sex, sexual orientation, or gender identity.            Thank you!     Thank you for choosing [facility]  for your care. Our goal is always to provide you with excellent care. Hearing back from our patients is one way we can continue to improve our services. Please take a few minutes to complete the written survey that you may receive in the mail after your visit with us. Thank you!             Your Updated Medication  List - Protect others around you: Learn how to safely use, store and throw away your medicines at www.disposemymeds.org.          This list is accurate as of 7/26/18 12:59 PM.  Always use your most recent med list.                   Brand Name Dispense Instructions for use Diagnosis    ASPIRIN LOW DOSE 81 MG EC tablet   Generic drug:  aspirin     100 tablet    TAKE 1 TABLET BY MOUTH DAILY    History of myocardial infarction in adulthood       * atorvastatin 40 MG tablet    LIPITOR    90 tablet    Take 1 tablet (40 mg) by mouth daily    Hyperlipidemia LDL goal <100       * atorvastatin 40 MG tablet    LIPITOR     TAKE 1 TABLET(40 MG) BY MOUTH DAILY    Hyperlipidemia LDL goal <100       diclofenac 1 % Gel topical gel    VOLTAREN    100 g    Place 4 g onto the skin 4 times daily    Midline low back pain without sciatica, unspecified chronicity       * levothyroxine 88 MCG tablet    SYNTHROID/LEVOTHROID    90 tablet    Take 1 tablet (88 mcg) by mouth daily    Hypothyroidism due to acquired atrophy of thyroid       * levothyroxine 88 MCG tablet    SYNTHROID/LEVOTHROID     TAKE 1 TABLET(88 MCG) BY MOUTH DAILY    Hypothyroidism due to acquired atrophy of thyroid       lisinopril 5 MG tablet    PRINIVIL/ZESTRIL    90 tablet    TAKE 1 TABLET(5 MG) BY MOUTH DAILY    Essential hypertension with goal blood pressure less than 140/90       meloxicam 15 MG tablet    MOBIC    30 tablet    Take 1 tablet (15 mg) by mouth daily    Midline low back pain without sciatica, unspecified chronicity       metoprolol succinate 50 MG 24 hr tablet    TOPROL-XL    90 tablet    TAKE 1 TABLET(50 MG) BY MOUTH DAILY    Essential hypertension with goal blood pressure less than 140/90       sildenafil 100 MG tablet    VIAGRA    8 tablet    TAKE ONE TABLET BY MOUTH DAILY 30 MINUTES TO 4 HOURS BEFORE INTERCOURSE AS NEEDED. NEVER USE WITH NITROGLYCERIN, TERAZOSIN OR DOXAZOSIN    Impotence of organic origin       tiotropium 18 MCG capsule    SPIRIVA  HANDIHALER    90 capsule    Inhale contents of one capsule daily.    Panlobular emphysema (H)       * Notice:  This list has 4 medication(s) that are the same as other medications prescribed for you. Read the directions carefully, and ask your doctor or other care provider to review them with you.

## 2018-07-26 NOTE — TELEPHONE ENCOUNTER
PA Initiation    Medication: voltaren gel   Insurance Company: [employer] - Phone [phone] Fax [phone]  [facility] [address] [place], [place] [address]VE AT [place] [address]  Filling Pharmacy Phone: [phone]  Filling Pharmacy Fax:    Start Date: 7/26/2018    THIS HAS BEEN SUBMITTED BY THE PRIOR-AUTHORIZATION TEAM. ANY QUESTIONS PLEASE CALL [phone]. THANK YOU

## 2018-07-27 NOTE — PROGRESS NOTES
Diabetes is creeping up.  We should talk about medicines when you get done with your back  ROLAND BRAXTON

## 2018-07-27 NOTE — TELEPHONE ENCOUNTER
He can't use this agent unless he wishes to pay for it. . His Rx is oral NSAID and PT  Rigo Goddard

## 2018-08-02 ENCOUNTER — THERAPY VISIT (OUTPATIENT)
Dept: PHYSICAL THERAPY | Facility: CLINIC | Age: 73
End: 2018-08-02
Payer: COMMERCIAL

## 2018-08-02 DIAGNOSIS — M54.50 RIGHT-SIDED LOW BACK PAIN WITHOUT SCIATICA, UNSPECIFIED CHRONICITY: Primary | ICD-10-CM

## 2018-08-02 DIAGNOSIS — R26.9 GAIT DISTURBANCE: ICD-10-CM

## 2018-08-02 PROCEDURE — 97162 PT EVAL MOD COMPLEX 30 MIN: CPT | Mod: GP | Performed by: PHYSICAL THERAPIST

## 2018-08-02 PROCEDURE — 97110 THERAPEUTIC EXERCISES: CPT | Mod: GP | Performed by: PHYSICAL THERAPIST

## 2018-08-02 PROCEDURE — 97116 GAIT TRAINING THERAPY: CPT | Mod: GP | Performed by: PHYSICAL THERAPIST

## 2018-08-02 NOTE — PROGRESS NOTES
"[facility] Initial Evaluation -- Lumbar    Date: August 2, 2018  Rubio Gibbons is a 73 year old male with a lumbar condition.   Referral: Dr. Goddard  Work mechanical stresses:  Works a security; traffic control (driving)  Employment status:  Full time  Leisure mechanical stresses: not a formal exerciser  Functional disability score (CATHRYN/STarT Back):  See flow sheet  VAS score (0-10): 8/10  Patient goals/expectations:  \"to  Find out if there is anytning I can do to get out of the pain\"    HISTORY:    Present symptoms: R LBP, R calf  Pain quality (sharp/shooting/stabbing/aching/burning/cramping):  Dull ache   Paresthesia (yes/no):  Tingling R calf    Present since (onset date): July 2018.     Symptoms (improving/unchanging/worsening):  improving.     Symptoms commenced as a result of: no apparent    Condition occurred in the following environment:   home     Symptoms at onset (back/thigh/leg): back, leg  Constant symptoms (back/thigh/leg):   Intermittent symptoms (back/thigh/leg): back, leg    Symptoms are made worse with the following: Always Sitting, Always Standing, Always Walking and Time of day - No effect   Symptoms are made better with the following: Always Lying    Disturbed sleep (yes/no):  some Sleeping postures (prone/sup/side R/L): L side    Previous episodes (0/1-5/6-10/11+): 1 Year of first episode: ongoing/chronic    Previous history: episodic back pain,   Previous treatments: none      Specific Questions:  Cough/Sneeze/Strain (pos/neg): neg  Bowel/Bladder (normal/abnormal): normal  Gait (normal/abnormal): abnormal; +Trendelenburg  Medications (nil/NSAIDS/analg/steroids/anticoag/other):  OTC analgesic, Muscle relaxants and Other - Sleep and High blood pressure  Medical allergies:  none  General health (excellent/good/fair/poor):  good  Pertinent medical history:  Smoking  Imaging (None/Xray/MRI/Other):  none  Recent or major surgery (yes/no):  Yes, xrays lumbar  Night pain (yes/no): " yes, ?mechanical  Accidents (yes/no): no  Unexplained weight loss (yes/no): no  Barriers at home: none  Other red flags: none    EXAMINATION    Posture:   Sitting (good/fair/poor): fair  Standing (good/fair/poor):fair  Lordosis (red/acc/normal): red  Correction of posture (better/worse/no effect): no effect    Lateral Shift (right/left/nil): L  Relevant (yes/no):  no  Other Observations: none    Neurological:    Motor deficit:  intact  Reflexes:  intact  Sensory deficit:  intact  Dural signs:  neg    Movement Loss:   Tommy Mod Min Nil Pain   Flexion    x    Extension   x x    Side Gliding R   x x pdm R hip   Side Gliding L    x      Test Movements:   During: produces, abolishes, increases, decreases, no effect, centralizing, peripheralizing   After: better, worse, no better, no worse, no effect, centralized, peripheralized    Pretest symptoms standing: R hip   Symptoms During Symptoms After ROM increased ROM decreased No Effect   FIS              Rep FIS Increases  Peripheralising    No Worse      x   EIS No Effect    No Effect         Rep EIS No Effect    No Effect      x   Pretest symptoms lying: R hip    Symptoms During Symptoms After ROM increased ROM decreased No Effect   TAURUS        Rep TAURUS        EIL No Effect    No Effect         Rep EIL No Effect    No Effect      x   If required, pretest symptoms:    Symptoms During Symptoms After ROM increased ROM decreased No Effect   SGIS - R incr NW      Rep SGIS - R incr NW   x   SGIS - L        Rep SGIS - L          Static Tests:  Sitting slouched:    Sitting erect:    Standing slouched   Standing erect:    Lying prone in extension:   Long sitting:      Other Tests: SIJ testing is negative; R hip PROM full/painfree; repeated movement testing of hip NE; R glut med=3+,4-/5; ambulates with +Trendelengburg.     Provisional Classification:  Inconclusive/Other - Mechanically Inconclusive    Principle of Management:  Education:  Trial of use of cane for unloading painful  side   Equipment provided:    Mechanical therapy (Y/N):  ?   Extension principle:    Lateral Principle:    Flexion principle:    Other:  Hip strengthening    ASSESSMENT/PLAN:    Patient is a 73 year old male with lumbar and right side hip complaints.    Patient has the following significant findings with corresponding treatment plan.                Diagnosis 1:  R LBP/R hip pain  Pain -  manual therapy, self management, education, directional preference exercise and home program  Decreased strength - therapeutic exercise and therapeutic activities  Impaired gait - gait training  Decreased function - therapeutic activities    Therapy Evaluation Codes:   1) History comprised of:   Personal factors that impact the plan of care:      None.    Comorbidity factors that impact the plan of care are:      Pain at night/rest and Smoking.     Medications impacting care: High blood pressure, Muscle relaxant, Pain and Sleep.  2) Examination of Body Systems comprised of:   Body structures and functions that impact the plan of care:      Hip and Lumbar spine.   Activity limitations that impact the plan of care are:      Standing, Walking and Sleeping.  3) Clinical presentation characteristics are:   Evolving/Changing.  4) Decision-Making    Moderate complexity using standardized patient assessment instrument and/or measureable assessment of functional outcome.  Cumulative Therapy Evaluation is: Moderate complexity.    Previous and current functional limitations:  (See Goal Flow Sheet for this information)    Short term and Long term goals: (See Goal Flow Sheet for this information)     Communication ability:  Patient appears to be able to clearly communicate and understand verbal and written communication and follow directions correctly.  Treatment Explanation - The following has been discussed with the patient:   RX ordered/plan of care  Anticipated outcomes  Possible risks and side effects  This patient would benefit from PT  intervention to resume normal activities.   Rehab potential is good.    Frequency:  1 X week, once daily  Duration:  for 6 weeks  Discharge Plan:  Achieve all LTG.  Independent in home treatment program.  Reach maximal therapeutic benefit.    Please refer to the daily flowsheet for treatment today, total treatment time and time spent performing 1:1 timed codes.

## 2018-08-02 NOTE — MR AVS SNAPSHOT
After Visit Summary   8/2/2018    Rubio Gibbons    MRN: [id number]           Patient Information     Date Of Birth          1945        Visit Information        Provider Department      8/2/2018 12:10 PM Katy Mcnally, PT [facility]        Today's Diagnoses     Right-sided low back pain without sciatica, unspecified chronicity    -  1    Gait disturbance           Follow-ups after your visit        Your next 10 appointments already scheduled     Aug 10, 2018  7:40 AM CDT   [facility] Spine with BROOKE Riojas PT ([facility]  )    [address] [facility] [address]  [address]  [facility] [address]   [phone]            Aug 13, 2018  9:00 AM CDT   US ABDOMINAL AORTIC IMAGING with [facility] [place] [address] ([facility])    [address] [facility] [address]  [facility] [id number] [phone]           Please bring a list of your medicines (including vitamins, minerals and over-the-counter drugs). Also, tell your doctor about any allergies you may have. Wear comfortable clothes and leave your valuables at home.  Adults: No eating or drinking for 8 hours before the exam. You may take medicine with a small sip of water.  Children: - Infants, breast-fed: may have breast milk up to 2 hours before exam. - Infants, formula: may have bottle until 4 hours before exam. - Children 1-5 years: No food or drink for 4 hours before exam. - Children 6 -12 years: No food or drink for 6 hours before exam. - Children over 12 years: No food or drink for 8 hours before exam. - J Tube Fed: No need to stop feedings.  Please call the Imaging Department at your exam site with any questions.              Who to contact     If you have questions or need follow up information about today's clinic visit or your schedule please contact [facility] directly at [phone].  Normal or non-critical lab and imaging results will be communicated to you by Sammi  letter or phone within 4 business days after the clinic has received the results. If you do not hear from us within 7 days, please contact the clinic through FirstBesthart or phone. If you have a critical or abnormal lab result, we will notify you by phone as soon as possible.  Submit refill requests through ThrowMotion or call your pharmacy and they will forward the refill request to us. Please allow 3 business days for your refill to be completed.          Additional Information About Your Visit        Care EveryWhere ID     This is your Care EveryWhere ID. This could be used by other organizations to access your [place] medical records  [id number]         Blood Pressure from Last 3 Encounters:   07/26/18 135/82   09/25/17 139/83   07/03/17 136/72    Weight from Last 3 Encounters:   07/26/18 81.1 kg (178 lb 12.8 oz)   09/25/17 79.8 kg (176 lb)   07/03/17 79.4 kg (175 lb)              We Performed the Following     GAIT TRAINING THERAPY     HC PT EVAL, MODERATE COMPLEXITY     [facility] INITIAL EVAL REPORT     THERAPEUTIC EXERCISES        Primary Care Provider Office Phone # Fax #    Rigo Goddard [phone] [address] [id number]        Equal Access to Services     PATSY SANTOS : Hadii zully ku hadasho Soomaali, waaxda luqadaha, qaybta kaalmada adeegyada, niko hectorn sherrie childress . So [facility] [phone].    ATENCIÓN: Si habla español, tiene a cervantes disposición servicios gratuitos de asistencia lingüística. Llame al [phone].    We comply with applicable federal civil rights laws and [place] laws. We do not discriminate on the basis of race, color, national origin, age, disability, sex, sexual orientation, or gender identity.            Thank you!     Thank you for choosing [facility]  for your care. Our goal is always to provide you with excellent care. Hearing back from our patients is one way we can continue to improve our services. Please take a few minutes  to complete the written survey that you may receive in the mail after your visit with us. Thank you!             Your Updated Medication List - Protect others around you: Learn how to safely use, store and throw away your medicines at www.disposemymeds.org.          This list is accurate as of 8/2/18 11:59 PM.  Always use your most recent med list.                   Brand Name Dispense Instructions for use Diagnosis    ASPIRIN LOW DOSE 81 MG EC tablet   Generic drug:  aspirin     100 tablet    TAKE 1 TABLET BY MOUTH DAILY    History of myocardial infarction in adulthood       * atorvastatin 40 MG tablet    LIPITOR    90 tablet    Take 1 tablet (40 mg) by mouth daily    Hyperlipidemia LDL goal <100       * atorvastatin 40 MG tablet    LIPITOR     TAKE 1 TABLET(40 MG) BY MOUTH DAILY    Hyperlipidemia LDL goal <100       diclofenac 1 % Gel topical gel    VOLTAREN    100 g    Place 4 g onto the skin 4 times daily    Midline low back pain without sciatica, unspecified chronicity       * levothyroxine 88 MCG tablet    SYNTHROID/LEVOTHROID    90 tablet    Take 1 tablet (88 mcg) by mouth daily    Hypothyroidism due to acquired atrophy of thyroid       * levothyroxine 88 MCG tablet    SYNTHROID/LEVOTHROID     TAKE 1 TABLET(88 MCG) BY MOUTH DAILY    Hypothyroidism due to acquired atrophy of thyroid       lisinopril 5 MG tablet    PRINIVIL/ZESTRIL    90 tablet    TAKE 1 TABLET(5 MG) BY MOUTH DAILY    Essential hypertension with goal blood pressure less than 140/90       meloxicam 15 MG tablet    MOBIC    30 tablet    Take 1 tablet (15 mg) by mouth daily    Midline low back pain without sciatica, unspecified chronicity       metoprolol succinate 50 MG 24 hr tablet    TOPROL-XL    90 tablet    TAKE 1 TABLET(50 MG) BY MOUTH DAILY    Essential hypertension with goal blood pressure less than 140/90       sildenafil 100 MG tablet    VIAGRA    8 tablet    TAKE ONE TABLET BY MOUTH DAILY 30 MINUTES TO 4 HOURS BEFORE INTERCOURSE AS  NEEDED. NEVER USE WITH NITROGLYCERIN, TERAZOSIN OR DOXAZOSIN    Impotence of organic origin       tiotropium 18 MCG capsule    SPIRIVA HANDIHALER    90 capsule    Inhale contents of one capsule daily.    Panlobular emphysema (H)       * Notice:  This list has 4 medication(s) that are the same as other medications prescribed for you. Read the directions carefully, and ask your doctor or other care provider to review them with you.

## 2018-08-02 NOTE — PROGRESS NOTES
[facility] Initial Evaluation -- Lumbar    Date: August 2, 2018  Rubio Gibbons is a 73 year old male with a *** condition.   Referral: ***  Work mechanical stresses:  ***  Employment status:  ***  Leisure mechanical stresses: ***  Functional disability score (CATHRYN/STarT Back):  ***  VAS score (0-10): ***  Patient goals/expectations:  ***    HISTORY:    Present symptoms: ***  Pain quality (sharp/shooting/stabbing/aching/burning/cramping):  ***   Paresthesia (yes/no):  ***    Present since (onset date): ***.     Symptoms (improving/unchanging/worsening):  ***.     Symptoms commenced as a result of: ***   Condition occurred in the following environment:   ***     Symptoms at onset (back/thigh/leg): ***  Constant symptoms (back/thigh/leg): ***  Intermittent symptoms (back/thigh/leg): ***    Symptoms are made worse with the following: {[facility] Lumbar Better/Worse:051234}   Symptoms are made better with the following: {[facility] Lumbar Better/Worse:823252}    Disturbed sleep (yes/no):  *** Sleeping postures (prone/sup/side R/L): ***    Previous episodes (0/1-5/6-10/11+): *** Year of first episode: ***    Previous history: ***  Previous treatments: ***      Specific Questions:  Cough/Sneeze/Strain (pos/neg): ***  Bowel/Bladder (normal/abnormal): ***  Gait (normal/abnormal): ***  Medications (nil/NSAIDS/analg/steroids/anticoag/other):  {[facility] Medications:279193}  Medical allergies:  ***  General health (excellent/good/fair/poor):  ***  Pertinent medical history:  {[facility] Past Medical History:558732}  Imaging (None/Xray/MRI/Other):  ***  Recent or major surgery (yes/no):  ***  Night pain (yes/no): ***  Accidents (yes/no): ***  Unexplained weight loss (yes/no): ***  Barriers at home: ***  Other red flags: ***    EXAMINATION    Posture:   Sitting (good/fair/poor): ***  Standing (good/fair/poor):***  Lordosis (red/acc/normal): ***  Correction of posture (better/worse/no effect): ***    Lateral Shift (right/left/nil):  "***  Relevant (yes/no):  ***  Other Observations: ***    Neurological:    Motor deficit:  ***  Reflexes:  ***  Sensory deficit:  ***  Dural signs:  ***    Movement Loss:   Tommy Mod Min Nil Pain   Flexion     ***   Extension     ***   Side Gliding R     ***   Side Gliding L     ***     Test Movements:   During: produces, abolishes, increases, decreases, no effect, centralizing, peripheralizing   After: better, worse, no better, no worse, no effect, centralized, peripheralized    Pretest symptoms standing: ***   Symptoms During Symptoms After ROM increased ROM decreased No Effect   FIS {K[facility] MDT During Testin::\" \"} {KEL[facility] MDT After Testin::\" \"}      Rep FIS {KELVIN MDT During Testin::\" \"} {KEL[facility]IN MDT After Testin::\" \"}      EIS {KELVIN MDT During Testin::\" \"} {KEL[facility]IN MDT After Testin::\" \"}      Rep EIS {KEL[facility]IN MDT During Testin::\" \"} {KEL[facility] MDT After Testin::\" \"}      Pretest symptoms lying: ***    Symptoms During Symptoms After ROM increased ROM decreased No Effect   TAURUS {KELVIN MDT During Testin::\" \"} {KELVIN MDT After Testin::\" \"}      Rep TAURUS {KELVIN MDT During Testin::\" \"} {KELVIN MDT After Testin::\" \"}      EIL {KELVIN MDT During Testin::\" \"} {KELVIN MDT After Testin::\" \"}      Rep EIL {KELVIN MDT During Testin::\" \"} {KEL[facility]IN MDT After Testin::\" \"}      If required, pretest symptoms: ***   Symptoms During Symptoms After ROM increased ROM decreased No Effect   SGIS - R {KELVIN MDT During Testin::\" \"} {KELVIN MDT After Testin::\" \"}      Rep SGIS - R {KELVIN MDT During Testin::\" \"} {KELVIN MDT After Testin::\" \"}      SGIS - L {KELVIN MDT During Testin::\" \"} {KELVIN MDT After Testin::\" \"}      Rep SGIS - L {KELVIN MDT During Testin::\" \"} {KELVIN MDT After Testin::\" \"}        Static Tests:  Sitting slouched:  ***  Sitting erect:  ***  Standing slouched ***  Standing erect:  ***  Lying prone in " extension:  *** Long sitting:  ***    Other Tests: ***    Provisional Classification:  {guero mdt lumbar classification:194880}    Principle of Management:  Education:  ***   Equipment provided:  ***  Mechanical therapy (Y/N):  ***   Extension principle:  ***  Lateral Principle:  ***  Flexion principle:  ***  Other:  ***    ASSESSMENT/PLAN:    {REHAB NOTES:559286}

## 2018-08-03 PROBLEM — R26.9 GAIT DISTURBANCE: Status: ACTIVE | Noted: 2018-08-03

## 2018-08-08 DIAGNOSIS — I10 ESSENTIAL HYPERTENSION WITH GOAL BLOOD PRESSURE LESS THAN 140/90: ICD-10-CM

## 2018-08-08 NOTE — LETTER
August 9, 2018      Rubio Gibbons  [address] [address] [facility] [address]        Dear Rubio,     We recently received a call from your pharmacy requesting a refill of Lisinopril.     A review of your chart indicates that an appointment is required with your provider for 4 week recheck and discuss diabetes per Dr. Goddard. Please call the clinic at [phone] to schedule your appointment.     Taking care of your health is important to us and ongoing visits with your provider are vital to your care.  We look forward to seeing you in the near future.     Sincerely,        Rigo Goddard MD/Monique Brown RN

## 2018-08-09 RX ORDER — LISINOPRIL 5 MG/1
TABLET ORAL
Qty: 90 TABLET | Refills: 0 | Status: SHIPPED | OUTPATIENT
Start: 2018-08-09 | End: 2018-11-12

## 2018-08-09 NOTE — TELEPHONE ENCOUNTER
Medication approved per standing orders.  Letter sent.  To come discuss diabetes.    Monique Brown RN

## 2018-08-10 ENCOUNTER — THERAPY VISIT (OUTPATIENT)
Dept: PHYSICAL THERAPY | Facility: CLINIC | Age: 73
End: 2018-08-10
Payer: COMMERCIAL

## 2018-08-10 DIAGNOSIS — R26.9 GAIT DISTURBANCE: ICD-10-CM

## 2018-08-10 DIAGNOSIS — M54.50 RIGHT-SIDED LOW BACK PAIN WITHOUT SCIATICA, UNSPECIFIED CHRONICITY: ICD-10-CM

## 2018-08-10 PROCEDURE — 97110 THERAPEUTIC EXERCISES: CPT | Mod: GP | Performed by: PHYSICAL THERAPY ASSISTANT

## 2018-08-13 ENCOUNTER — HOSPITAL ENCOUNTER (OUTPATIENT)
Dept: ULTRASOUND IMAGING | Facility: CLINIC | Age: 73
Discharge: HOME | End: 2018-08-13
Attending: FAMILY MEDICINE | Admitting: FAMILY MEDICINE
Payer: COMMERCIAL

## 2018-08-13 DIAGNOSIS — I71.40 ABDOMINAL AORTIC ANEURYSM (AAA) WITHOUT RUPTURE (H): ICD-10-CM

## 2018-08-13 PROCEDURE — 76775 US EXAM ABDO BACK WALL LIM: CPT

## 2018-08-13 NOTE — LETTER
August 13, 2018      Rubio Gibbons  [address] [address] [facility] [address]        Dear ,    We are writing to inform you of your test results.    Aorta is stable. You will want to do this test yearly     ROLAND BRAXTON     Resulted Orders   US Abdominal Aorta Imaging    Narrative    PROCEDURE:  Aortic ultrasound    DATE OF PROCEDURE:  8/13/2018 8:53 AM    CLINICAL HISTORY/INDICATION:   73-year-old male with abdominal aortic aneurysm presents for  follow-up.    COMPARISON:  10/11/2016 aortic ultrasound.    TECHNIQUE:   Grayscale, color and spectral wave form analysis of the abdominal  aorta.    FINDINGS:  Diffuse aortic calcifications with infrarenal abdominal aortic  aneurysm. The abdominal aortic measurements are as follows:    Proximal (suprarenal) aorta: 2.7 cm AP x 2.5 cm transverse.  Mid (infrarenal) aorta:  2.4 cm AP x 2.3 cm transverse.  Distal aorta: 4.2 x 4.1 x 6.2 cm (AP x TR x long)  Right common iliac artery: 1.5 cm.  Left common iliac artery: 1.5 cm.       Impression    IMPRESSION: 4.2 cm infrarenal abdominal aortic aneurysm is not  significantly changed from prior examination.      ANISA GOETZ MD       If you have any questions or concerns, please call the clinic at the number listed above.       Sincerely,        [facility] [place] ULTRASOUND ROOM 1/MM

## 2018-08-16 ENCOUNTER — THERAPY VISIT (OUTPATIENT)
Dept: PHYSICAL THERAPY | Facility: CLINIC | Age: 73
End: 2018-08-16
Payer: COMMERCIAL

## 2018-08-16 DIAGNOSIS — M54.50 RIGHT-SIDED LOW BACK PAIN WITHOUT SCIATICA, UNSPECIFIED CHRONICITY: ICD-10-CM

## 2018-08-16 DIAGNOSIS — R26.9 GAIT DISTURBANCE: ICD-10-CM

## 2018-08-16 PROCEDURE — 97112 NEUROMUSCULAR REEDUCATION: CPT | Mod: GP | Performed by: PHYSICAL THERAPIST

## 2018-08-16 PROCEDURE — 97110 THERAPEUTIC EXERCISES: CPT | Mod: GP | Performed by: PHYSICAL THERAPIST

## 2018-10-03 NOTE — PROGRESS NOTES
"Discharge Note    Progress reporting period is from initial eval to Aug 16, 2018.     Rubio failed to return for next follow up visit and current status is unknown.  Please see information below for last relevant information on current status.  Patient seen for 3 visits.  SUBJECTIVE  Subjective changes noted by patient:  Patient reports symptoms are improving.  Pain is less - 5/10 at most, usually 4/10.  Prolong walking on concrete causes increased pain.  Patient is located right lateral hip and mildly in right low back. Press-up \"feels the best\".  Patient doing  press-up 1x/day.  He does standing sideglide 1x every other day.  .  Current pain level is 0/10.     Previous pain level was  9/10 (at worst w/WB).   Changes in function:  Yes (See Goal flowsheet attached for changes in current functional level)  Adverse reaction to treatment or activity: None    OBJECTIVE  Changes noted in objective findings: TROM: min loss flexion, mod loss ext, extension improves with repeated press-up.  Greater trochanter is not tender to palpation.  Recommended do press-ups and sideglide every couple hours and continue with established core strengthening exercises.  Stork Balance: ~8 sec B.  Hip screen (-).     ASSESSMENT/PLAN  Diagnosis: LBP/R hip   DIAGP:  Diagnoses of Gait disturbance and Right-sided low back pain without sciatica, unspecified chronicity were pertinent to this visit.  Updated problem list and treatment plan:   Pain - HEP  Decreased ROM/flexibility - HEP  Decreased function - HEP  Decreased strength - HEP  STG/LTGs have been met or progress has been made towards goals:  Yes, please see goal flowsheet for most current information  Assessment of Progress: current status is unknown.    Last current status: Pt is progressing as expected   Self Management Plans:  HEP  I have re-evaluated this patient and find that the nature, scope, duration and intensity of the therapy is appropriate for the medical condition of the " patient.  Rubio continues to require the following intervention to meet STG and LTG's:  HEP.    Recommendations:  Discharge with current home program.  Patient to follow up with MD as needed.    Please refer to the daily flowsheet for treatment today, total treatment time and time spent performing 1:1 timed codes.

## 2018-10-04 ENCOUNTER — OFFICE VISIT (OUTPATIENT)
Dept: FAMILY MEDICINE | Facility: CLINIC | Age: 73
End: 2018-10-04
Payer: COMMERCIAL

## 2018-10-04 ENCOUNTER — RADIANT APPOINTMENT (OUTPATIENT)
Dept: GENERAL RADIOLOGY | Facility: CLINIC | Age: 73
End: 2018-10-04
Attending: FAMILY MEDICINE
Payer: COMMERCIAL

## 2018-10-04 VITALS
SYSTOLIC BLOOD PRESSURE: 148 MMHG | HEART RATE: 68 BPM | DIASTOLIC BLOOD PRESSURE: 75 MMHG | WEIGHT: 175 LBS | RESPIRATION RATE: 14 BRPM | TEMPERATURE: 98.1 F | HEIGHT: 66 IN | BODY MASS INDEX: 28.12 KG/M2

## 2018-10-04 DIAGNOSIS — M43.16 SPONDYLOLISTHESIS OF LUMBAR REGION: ICD-10-CM

## 2018-10-04 DIAGNOSIS — M25.561 RIGHT KNEE PAIN, UNSPECIFIED CHRONICITY: ICD-10-CM

## 2018-10-04 DIAGNOSIS — M25.562 LEFT KNEE PAIN, UNSPECIFIED CHRONICITY: ICD-10-CM

## 2018-10-04 DIAGNOSIS — M25.551 HIP PAIN, RIGHT: ICD-10-CM

## 2018-10-04 DIAGNOSIS — I10 HTN, GOAL BELOW 140/90: ICD-10-CM

## 2018-10-04 DIAGNOSIS — M25.551 HIP PAIN, RIGHT: Primary | ICD-10-CM

## 2018-10-04 PROCEDURE — 73565 X-RAY EXAM OF KNEES: CPT

## 2018-10-04 PROCEDURE — 73560 X-RAY EXAM OF KNEE 1 OR 2: CPT | Mod: RT

## 2018-10-04 PROCEDURE — 99214 OFFICE O/P EST MOD 30 MIN: CPT | Performed by: FAMILY MEDICINE

## 2018-10-04 PROCEDURE — 73523 X-RAY EXAM HIPS BI 5/> VIEWS: CPT

## 2018-10-04 NOTE — MR AVS SNAPSHOT
After Visit Summary   10/4/2018    Rubio Gibbons    MRN: [id number]           Patient Information     Date Of Birth          1945        Visit Information        Provider Department      10/4/2018 2:15 PM Rigo Goddard MD [facility]        Today's Diagnoses     Hip pain, right    -  1    Left knee pain, unspecified chronicity        Right knee pain, unspecified chronicity        Spondylolisthesis of lumbar region        HTN, goal below 140/90          Care Instructions    New Shingles Vaccination @ pharmacy            Follow-ups after your visit        Additional Services     NEUROSURGERY REFERRAL       Your provider has referred you to: [facility] [phone]   [email or web address]    Please be aware that coverage of these services is subject to the terms and limitations of your health insurance plan.  Call member services at your health plan with any benefit or coverage questions.      Please bring the following with you to your appointment:    (1) Any X-Rays, CTs or MRIs which have been performed.  Contact the facility where they were done to arrange for  prior to your scheduled appointment.   (2) List of current medications  (3) This referral request   (4) Any documents/labs given to you for this referral                  Follow-up notes from your care team     Return in about 3 months (around 1/4/2019).      Future tests that were ordered for you today     Open Future Orders        Priority Expected Expires Ordered    MR Lumbar Spine w/o Contrast Routine  10/4/2019 10/4/2018            Who to contact     If you have questions or need follow up information about today's clinic visit or your schedule please contact [facility] directly at [phone].  Normal or non-critical lab and imaging results will be communicated to you by MyChart, letter or phone within 4 business days  "after the clinic has received the results. If you do not hear from us within 7 days, please contact the clinic through VigLinkt or phone. If you have a critical or abnormal lab result, we will notify you by phone as soon as possible.  Submit refill requests through Genterpret or call your pharmacy and they will forward the refill request to us. Please allow 3 business days for your refill to be completed.          Additional Information About Your Visit        Care EveryWhere ID     This is your Care EveryWhere ID. This could be used by other organizations to access your [place] medical records  [id number]        Your Vitals Were     Pulse Temperature Respirations Height BMI (Body Mass Index)       68 98.1  F (36.7  C) (Oral) 14 5' 6\" (1.676 m) 28.25 kg/m2        Blood Pressure from Last 3 Encounters:   10/04/18 148/75   07/26/18 135/82   09/25/17 139/83    Weight from Last 3 Encounters:   10/04/18 175 lb (79.4 kg)   07/26/18 178 lb 12.8 oz (81.1 kg)   09/25/17 176 lb (79.8 kg)              We Performed the Following     NEUROSURGERY REFERRAL        Primary Care Provider Office Phone # Fax #    Rigo Goddard [phone] [address] [facility] [address]        Equal Access to Services     PATSY SANTOS AH: Hadii zully ku hadasho Soomaali, waaxda luqadaha, qaybta kaalmada adeegyada, niko garrett haykeo childress . So [facility] [phone].    ATENCIÓN: Si habla español, tiene a cervantes disposición servicios gratuitos de asistencia lingüística. Llame al [phone].    We comply with applicable federal civil rights laws and [place] laws. We do not discriminate on the basis of race, color, national origin, age, disability, sex, sexual orientation, or gender identity.            Thank you!     Thank you for choosing [facility]  for your care. Our goal is always to provide you with excellent care. Hearing back from our patients is one way we can continue to improve our " services. Please take a few minutes to complete the written survey that you may receive in the mail after your visit with us. Thank you!             Your Updated Medication List - Protect others around you: Learn how to safely use, store and throw away your medicines at www.disposemymeds.org.          This list is accurate as of 10/4/18 11:59 PM.  Always use your most recent med list.                   Brand Name Dispense Instructions for use Diagnosis    ASPIRIN LOW DOSE 81 MG EC tablet   Generic drug:  aspirin     100 tablet    TAKE 1 TABLET BY MOUTH DAILY    History of myocardial infarction in adulthood       atorvastatin 40 MG tablet    LIPITOR     TAKE 1 TABLET(40 MG) BY MOUTH DAILY    Hyperlipidemia LDL goal <100       diclofenac 1 % Gel topical gel    VOLTAREN    100 g    Place 4 g onto the skin 4 times daily    Midline low back pain without sciatica, unspecified chronicity       levothyroxine 88 MCG tablet    SYNTHROID/LEVOTHROID     TAKE 1 TABLET(88 MCG) BY MOUTH DAILY    Hypothyroidism due to acquired atrophy of thyroid       lisinopril 5 MG tablet    PRINIVIL/ZESTRIL    90 tablet    TAKE 1 TABLET(5 MG) BY MOUTH DAILY    Essential hypertension with goal blood pressure less than 140/90       meloxicam 15 MG tablet    MOBIC    90 tablet    TAKE 1 TABLET(15 MG) BY MOUTH DAILY    Midline low back pain without sciatica, unspecified chronicity       metoprolol succinate 50 MG 24 hr tablet    TOPROL-XL    90 tablet    TAKE 1 TABLET(50 MG) BY MOUTH DAILY    Essential hypertension with goal blood pressure less than 140/90       sildenafil 100 MG tablet    VIAGRA    8 tablet    TAKE ONE TABLET BY MOUTH DAILY 30 MINUTES TO 4 HOURS BEFORE INTERCOURSE AS NEEDED. NEVER USE WITH NITROGLYCERIN, TERAZOSIN OR DOXAZOSIN    Impotence of organic origin       tiotropium 18 MCG capsule    SPIRIVA HANDIHALER    90 capsule    Inhale contents of one capsule daily.    Panlobular emphysema (H)

## 2018-10-04 NOTE — PROGRESS NOTES
SUBJECTIVE:   Rubio Gibbons is a 73 year old male who presents to clinic today for the following health issues:    Hip pain, right  (primary encounter diagnosis) - Patient has been experiencing soreness and pain on R lower back and into the hip in the AM for the past 2 years, some relief subsides throughout the day. Attended PT 2x for back.     Right Knee Pain- Soreness in joints.    Left Knee Pain - Soreness in joints.  Joint Pain    Onset: rt hip and rt side of lower vs x    Description:   Location: left knee, right knee and left hip  Character: sore in AM    Intensity: 8/10 at worse     Progression of Symptoms: better    Accompanying Signs & Symptoms:  Other symptoms: radiation of pain to lower     History:   Previous similar pain: no       Precipitating factors:   Trauma or overuse: no     Alleviating factors:  Improved by: nothing    Therapies Tried and outcome:  In [facility] July, XR showed  deg discs, spondylolisthesis, facet joint OA, PT choripractic  PT a few times.            Problem list and histories reviewed & adjusted, as indicated.  Additional history: none    Patient Active Problem List   Diagnosis     Tobacco use disorder     HYPERLIPIDEMIA LDL GOAL <100     Advanced directives, counseling/discussion     HTN, goal below 140/90     WIN (dyspnea on exertion)     Hypothyroidism due to acquired atrophy of thyroid     Panlobular emphysema (H)     History of myocardial infarction in adulthood     Presbycusis syndrome, bilateral     Diabetes mellitus type 2, uncomplicated (H)     Abdominal aortic aneurysm (AAA) without rupture (H)     Microalbuminuria     Right-sided low back pain without sciatica, unspecified chronicity     Muscle spasm     Gait disturbance     Past Surgical History:   Procedure Laterality Date     ANGIOPLASTY  1998     COLONOSCOPY N/A 11/17/2016    Procedure: COLONOSCOPY;  Surgeon: Adam Lantigua MD;  Location:  GI       Social History   Substance Use Topics     Smoking status:  "Current Some Day Smoker     Packs/day: 0.25     Years: 40.00     Types: Cigarettes     Smokeless tobacco: Never Used     Alcohol use No     Family History   Problem Relation Age of Onset     Cardiovascular Father      heart attack     Coronary Artery Disease Father      Cerebrovascular Disease Father      Cardiovascular Brother      heart attack     Cardiovascular Sister      2 sisters - minor cardiac problems- pt unsure of the exact nature         SOC Recently retired, less active  Reviewed and updated as needed this visit by clinical staff  Tobacco  Allergies  Meds  Med Hx  Surg Hx  Fam Hx  Soc Hx      Reviewed and updated as needed this visit by Provider         ROS:    No falls, fevers      This document serves as a record of the services and decisions personally performed and made by Rigo Goddard MD. It was created on his behalf by Antonia Schroeder, a trained medical scribe. The creation of this document is based on the provider's statements to the medical scribe.  Antonia Schroeder October 4, 2018 2:57 PM      OBJECTIVE:     /75 (BP Location: Left arm, Patient Position: Chair, Cuff Size: Adult Large)  Pulse 68  Temp 98.1  F (36.7  C) (Oral)  Resp 14  Ht 1.676 m (5' 6\")  Wt 79.4 kg (175 lb)  BMI 28.25 kg/m2  Body mass index is 28.25 kg/(m^2).  GENERAL: healthy, alert   R Hip, both knees w/o tendernss, elicited pain, , effusion  XR hips, knees well preserved, slight OA  Diagnostic Test Results:  No results found for this or any previous visit (from the past 24 hour(s)).    ASSESSMENT/PLAN:     ASSESSMENT / PLAN:  (M25.551) Hip pain, right  (primary encounter diagnosis)  Comment: appears referred  Plan: XR Pelvis and Hip Bilateral 2 Views, CANCELED:         H X-RAY HIPS, BILAT (W PELVIS IF PERF), 1 VW            (M25.562) Left knee pain, unspecified chronicity  Comment: appears referred  Plan: XR Knee AP Standing Bilateral, XR Knee         Bilateral 1/2 Views, CANCELED: XR Knee Standing        1 Vw Ap " David & 2 Vws David        \    (M25.561) Right knee pain, unspecified chronicity  Comment: appears referred  Plan: XR Knee AP Standing Bilateral, XR Knee         Bilateral 1/2 Views, CANCELED: XR Knee Standing        1 Vw Ap David & 2 Vws David            (M43.16) Spondylolisthesis of lumbar region  Comment: Appears to be developing spinal stenosis  Plan: MR Lumbar Spine w/o Contrast, NEUROSURGERY         REFERRAL        Discussed options.    (I10) HTN, goal below 140/90  Comment:  BP Readings from Last 6 Encounters:   10/04/18 148/75   07/26/18 135/82   09/25/17 139/83   07/03/17 136/72   03/16/17 126/74   12/06/16 137/80       Plan: monitor          Rigo Goddard MD      The information in this document, created by the medical scribe for me, accurately reflects the services I personally performed and the decisions made by me. I have reviewed and approved this document for accuracy prior to leaving the patient care area.  October 4, 2018 2:57 PM      Rigo Goddard MD  [facility]

## 2018-10-17 ENCOUNTER — HOSPITAL ENCOUNTER (OUTPATIENT)
Dept: MRI IMAGING | Facility: CLINIC | Age: 73
Discharge: HOME | End: 2018-10-17
Attending: FAMILY MEDICINE | Admitting: FAMILY MEDICINE
Payer: COMMERCIAL

## 2018-10-17 DIAGNOSIS — M43.16 SPONDYLOLISTHESIS OF LUMBAR REGION: ICD-10-CM

## 2018-10-17 PROCEDURE — 72148 MRI LUMBAR SPINE W/O DYE: CPT

## 2018-10-17 NOTE — LETTER
October 19, 2018      Rubio Gibbons  [address] [facility] [place] [address]        Dear Gibbons,    The back people can review your options with you.        Resulted Orders   MR Lumbar Spine w/o Contrast    Narrative    MRI LUMBAR SPINE WITHOUT CONTRAST   10/17/2018 7:49 AM     HISTORY: Right low back pain. Spondylolisthesis.    COMPARISON: None.    TECHNIQUE: Multiplanar MR imaging was performed without contrast.    FINDINGS: Numbering of the levels is based on what appear to be five  lumbar type vertebral bodies. There is a grade 2 spondylolisthesis at  L5-S1 secondary to chronic-appearing bilateral pars interarticularis  defects at L5. There is also a mild to moderate right convexity  curvature of the mid lumbar spine. Vertebral body alignment is  otherwise normal. No fracture is seen. No other pars interarticularis  defect is demonstrated. No osseous lesion is seen. There are moderate  Modic type I signal changes in the endplates adjacent to the L5-S1 and  to a lesser degree the L4-L5 discs with mild Modic type I signal  change adjacent to the L2-L3 disc. There are also likely mild  degenerative signal changes in the inferior endplate of the T11  vertebral body. No other abnormal marrow signal intensity is  demonstrated. The conus medullaris terminates at the level of the  T12-L1 disc. No intrathecal abnormality is seen. The adjacent soft  tissues are unremarkable.    Findings by specific level:    T12-L1: There is moderate disc height loss. There is a minimal disc  bulge with no herniation or stenosis seen. The facet joints are  unremarkable.    L1-L2: The disc and facet joints are normal. No stenosis is seen.    L2-L3: The disc and facet joints are normal. No stenosis is seen.    L3-L4: There is moderate to advanced disc height loss, more so on the  left. There is a mild disc bulge with no focal herniation seen. There  are mild degenerative changes in the facet joints with mild prominence  of the  left ligamentum flavum. There is a mild degree of central canal  stenosis with moderate left and mild right neural foraminal stenosis.    L4-L5: There is moderate disc height loss on the right. There is a  mild disc bulge with no focal herniation seen. There are mild  degenerative changes in the facet joints. No central canal stenosis is  present. There is moderate to severe right and moderate left neural  foraminal stenosis.    L5-S1: There is prominent disc height loss. There is uncovering of the  posterior disc margin due to the spondylolisthesis. There is  additional disc bulging with no focal herniation seen. There are mild  degenerative changes in the facet joints. No central canal stenosis is  present. There is severe right and moderate left neural foraminal  stenosis.      Impression    IMPRESSION:   1. Chronic appearing grade 2 spondylolytic spondylolisthesis at L5-S1.  2. Degenerative changes with no disc herniation demonstrated. There is  severe right and moderate left neural foraminal stenosis at L5-S1.  There is moderate to severe right and moderate left neural foraminal  stenosis at L4-L5. There is moderate left foraminal stenosis at L3-L4  with mild central canal and right foraminal narrowing at this level.    KENYETTA CAPONE MD       If you have any questions or concerns, please call the clinic at the number listed above.       Sincerely,        Rigo Goddard M.D.

## 2018-11-12 DIAGNOSIS — E03.4 HYPOTHYROIDISM DUE TO ACQUIRED ATROPHY OF THYROID: ICD-10-CM

## 2018-11-12 DIAGNOSIS — E78.5 HYPERLIPIDEMIA LDL GOAL <100: ICD-10-CM

## 2018-11-12 DIAGNOSIS — I10 ESSENTIAL HYPERTENSION WITH GOAL BLOOD PRESSURE LESS THAN 140/90: ICD-10-CM

## 2018-11-12 NOTE — LETTER
November 14, 2018      Rubio Gibbons  [address] [facility] [place] [address]        Dear Rubio,     We recently received a call from your pharmacy requesting a refill of multiple.     A review of your chart indicates that an appointment is required with your provider for 3 month visit-due 1/4/19. Please call the clinic at [phone] to schedule your appointment.     We have authorized one 3 month refill of your medications to allow time for you to schedule your appointment.      Taking care of your health is important to us and ongoing visits with your provider are vital to your care.  We look forward to seeing you in the near future.     Sincerely,        Rigo Goddard MD/Monique Brown RN

## 2018-11-12 NOTE — TELEPHONE ENCOUNTER
"Requested Prescriptions   Pending Prescriptions Disp Refills     lisinopril (PRINIVIL/ZESTRIL) 5 MG tablet [Pharmacy Med Name: LISINOPRIL 5MG TABLETS]  Last Written Prescription Date:  8/9/2018  Last Fill Quantity: 90 tablet,  # refills: 0  Last office visit: 10/4/2018 with prescribing provider:  Nitza  Future Office Visit:     90 tablet 0     Sig: TAKE 1 TABLET(5 MG) BY MOUTH DAILY    ACE Inhibitors (Including Combos) Protocol Failed    11/12/2018 12:50 PM       Failed - Blood pressure under 140/90 in past 12 months    BP Readings from Last 3 Encounters:   10/04/18 148/75   07/26/18 135/82   09/25/17 139/83                Passed - Recent (12 mo) or future (30 days) visit within the authorizing provider's specialty    Patient had office visit in the last 12 months or has a visit in the next 30 days with authorizing provider or within the authorizing provider's specialty.  See \"Patient Info\" tab in inbasket, or \"Choose Columns\" in Meds & Orders section of the refill encounter.             Passed - Patient is age 18 or older       Passed - Normal serum creatinine on file in past 12 months    Recent Labs   Lab Test  07/26/18   0950   CR  0.91            Passed - Normal serum potassium on file in past 12 months    Recent Labs   Lab Test  07/26/18   0950   POTASSIUM  4.7             metoprolol succinate (TOPROL-XL) 50 MG 24 hr tablet [Pharmacy Med Name: METOPROLOL ER SUCCINATE 50MG TABS]  Last Written Prescription Date:  9/25/2017  Last Fill Quantity: 90 tablet,  # refills: 3   Last office visit: 10/4/2018 with prescribing provider:  Nitza   Future Office Visit:     90 tablet 0     Sig: TAKE 1 TABLET(50 MG) BY MOUTH DAILY    Beta-Blockers Protocol Failed    11/12/2018 12:50 PM       Failed - Blood pressure under 140/90 in past 12 months    BP Readings from Last 3 Encounters:   10/04/18 148/75   07/26/18 135/82   09/25/17 139/83                Passed - Patient is age 6 or older       Passed - Recent (12 mo) or future (30 " "days) visit within the authorizing provider's specialty    Patient had office visit in the last 12 months or has a visit in the next 30 days with authorizing provider or within the authorizing provider's specialty.  See \"Patient Info\" tab in inbasket, or \"Choose Columns\" in Meds & Orders section of the refill encounter.              levothyroxine (SYNTHROID/LEVOTHROID) 88 MCG tablet [Pharmacy Med Name: LEVOTHYROXINE 0.088MG (88MCG) TAB]  Medication has been discontinued in patient chart  Last Written Prescription Date:  9/25/2017  Last Fill Quantity: 90 tablet,  # refills: 3   Last office visit: 10/4/2018 with prescribing provider:  Nitza      90 tablet 0     Sig: TAKE 1 TABLET(88 MCG) BY MOUTH DAILY    Thyroid Protocol Passed    11/12/2018 12:50 PM       Passed - Patient is 12 years or older       Passed - Recent (12 mo) or future (30 days) visit within the authorizing provider's specialty    Patient had office visit in the last 12 months or has a visit in the next 30 days with authorizing provider or within the authorizing provider's specialty.  See \"Patient Info\" tab in inbasket, or \"Choose Columns\" in Meds & Orders section of the refill encounter.             Passed - Normal TSH on file in past 12 months    Recent Labs   Lab Test  07/26/18   0950   TSH  2.19              atorvastatin (LIPITOR) 40 MG tablet [Pharmacy Med Name: ATORVASTATIN 40MG TABLETS]  Medication has been discontinued in patient chart  Last Written Prescription Date:  9/25/2017  Last Fill Quantity: 90 tablet,  # refills: 3   Last office visit: 10/4/2018 with prescribing provider:  Nitza     Future Office Visit:     90 tablet 0     Sig: TAKE 1 TABLET(40 MG) BY MOUTH DAILY    Statins Protocol Passed    11/12/2018 12:50 PM       Passed - LDL on file in past 12 months    Recent Labs   Lab Test  07/26/18   0950   LDL  72            Passed - No abnormal creatine kinase in past 12 months    No lab results found.            Passed - Recent (12 mo) or " "future (30 days) visit within the authorizing provider's specialty    Patient had office visit in the last 12 months or has a visit in the next 30 days with authorizing provider or within the authorizing provider's specialty.  See \"Patient Info\" tab in inbasket, or \"Choose Columns\" in Meds & Orders section of the refill encounter.             Passed - Patient is age 18 or older          "

## 2018-11-14 RX ORDER — LISINOPRIL 5 MG/1
TABLET ORAL
Qty: 90 TABLET | Refills: 0 | Status: SHIPPED | OUTPATIENT
Start: 2018-11-14 | End: 2019-02-01

## 2018-11-14 RX ORDER — ATORVASTATIN CALCIUM 40 MG/1
TABLET, FILM COATED ORAL
Qty: 90 TABLET | Refills: 0 | Status: SHIPPED | OUTPATIENT
Start: 2018-11-14 | End: 2019-02-01

## 2018-11-14 RX ORDER — METOPROLOL SUCCINATE 50 MG/1
TABLET, EXTENDED RELEASE ORAL
Qty: 90 TABLET | Refills: 0 | Status: SHIPPED | OUTPATIENT
Start: 2018-11-14 | End: 2019-02-01

## 2018-11-14 RX ORDER — LEVOTHYROXINE SODIUM 88 UG/1
TABLET ORAL
Qty: 90 TABLET | Refills: 0 | Status: SHIPPED | OUTPATIENT
Start: 2018-11-14 | End: 2019-02-01

## 2018-11-14 NOTE — TELEPHONE ENCOUNTER
Lisinopril and Metoprolol- failing bp.  One refill sent.  Advised 3 month visit.  Reminder letter sent.  Levothyroxine and Atorvastatin sent PSO.  Monique Brown RN       10/4/18  (I10) HTN, goal below 140/90  Comment:      BP Readings from Last 6 Encounters:   10/04/18 148/75   07/26/18 135/82   09/25/17 139/83   07/03/17 136/72   03/16/17 126/74   12/06/16 137/80         Plan: monitor      Rigo Goddard MD         Return in about 3 months (around 1/4/2019).

## 2019-02-01 ENCOUNTER — OFFICE VISIT (OUTPATIENT)
Dept: FAMILY MEDICINE | Facility: CLINIC | Age: 74
End: 2019-02-01
Payer: COMMERCIAL

## 2019-02-01 VITALS
OXYGEN SATURATION: 97 % | TEMPERATURE: 98 F | HEIGHT: 66 IN | DIASTOLIC BLOOD PRESSURE: 71 MMHG | BODY MASS INDEX: 28.12 KG/M2 | RESPIRATION RATE: 16 BRPM | SYSTOLIC BLOOD PRESSURE: 116 MMHG | WEIGHT: 175 LBS | HEART RATE: 60 BPM

## 2019-02-01 DIAGNOSIS — E78.5 HYPERLIPIDEMIA LDL GOAL <100: ICD-10-CM

## 2019-02-01 DIAGNOSIS — E11.9 TYPE 2 DIABETES MELLITUS WITHOUT COMPLICATION, WITHOUT LONG-TERM CURRENT USE OF INSULIN (H): ICD-10-CM

## 2019-02-01 DIAGNOSIS — N52.9 IMPOTENCE OF ORGANIC ORIGIN: ICD-10-CM

## 2019-02-01 DIAGNOSIS — J43.1 PANLOBULAR EMPHYSEMA (H): Primary | ICD-10-CM

## 2019-02-01 DIAGNOSIS — I10 ESSENTIAL HYPERTENSION WITH GOAL BLOOD PRESSURE LESS THAN 140/90: ICD-10-CM

## 2019-02-01 DIAGNOSIS — E03.4 HYPOTHYROIDISM DUE TO ACQUIRED ATROPHY OF THYROID: ICD-10-CM

## 2019-02-01 LAB — HBA1C MFR BLD: 7.2 % (ref 0–5.6)

## 2019-02-01 PROCEDURE — 99214 OFFICE O/P EST MOD 30 MIN: CPT | Performed by: FAMILY MEDICINE

## 2019-02-01 PROCEDURE — 83036 HEMOGLOBIN GLYCOSYLATED A1C: CPT | Performed by: FAMILY MEDICINE

## 2019-02-01 PROCEDURE — 82043 UR ALBUMIN QUANTITATIVE: CPT | Performed by: FAMILY MEDICINE

## 2019-02-01 PROCEDURE — 36415 COLL VENOUS BLD VENIPUNCTURE: CPT | Performed by: FAMILY MEDICINE

## 2019-02-01 RX ORDER — SILDENAFIL 100 MG/1
TABLET, FILM COATED ORAL
Qty: 12 TABLET | Refills: 11 | Status: SHIPPED | OUTPATIENT
Start: 2019-02-01 | End: 2019-10-09

## 2019-02-01 RX ORDER — LISINOPRIL 5 MG/1
TABLET ORAL
Qty: 90 TABLET | Refills: 1 | Status: SHIPPED | OUTPATIENT
Start: 2019-02-01 | End: 2019-08-09

## 2019-02-01 RX ORDER — ATORVASTATIN CALCIUM 40 MG/1
40 TABLET, FILM COATED ORAL DAILY
Qty: 90 TABLET | Refills: 1 | Status: SHIPPED | OUTPATIENT
Start: 2019-02-01 | End: 2019-08-09

## 2019-02-01 RX ORDER — LEVOTHYROXINE SODIUM 88 UG/1
88 TABLET ORAL DAILY
Qty: 90 TABLET | Refills: 1 | Status: SHIPPED | OUTPATIENT
Start: 2019-02-01 | End: 2019-08-09

## 2019-02-01 RX ORDER — TIOTROPIUM BROMIDE 18 UG/1
CAPSULE ORAL; RESPIRATORY (INHALATION)
Qty: 90 CAPSULE | Refills: 3 | Status: SHIPPED | OUTPATIENT
Start: 2019-02-01 | End: 2019-10-09

## 2019-02-01 RX ORDER — METOPROLOL SUCCINATE 50 MG/1
50 TABLET, EXTENDED RELEASE ORAL DAILY
Qty: 90 TABLET | Refills: 1 | Status: SHIPPED | OUTPATIENT
Start: 2019-02-01 | End: 2019-08-09

## 2019-02-01 ASSESSMENT — MIFFLIN-ST. JEOR: SCORE: 1476.54

## 2019-02-01 NOTE — LETTER
February 4, 2019      Rubio Gibbons  [address] [facility] [place] [address]        Dear ,    We are writing to inform you of your test results.    Tests are all satisfactory     Resulted Orders   Hemoglobin A1c   Result Value Ref Range    Hemoglobin A1C 7.2 (H) 0 - 5.6 %      Comment:      Normal <5.7% Prediabetes 5.7-6.4%  Diabetes 6.5% or higher - adopted from ADA   consensus guidelines.     Albumin Random Urine Quantitative with Creat Ratio   Result Value Ref Range    Creatinine Urine 133 mg/dL    Albumin Urine mg/L 31 mg/L    Albumin Urine mg/g Cr 23.61 (H) 0 - 17 mg/g Cr       If you have any questions or concerns, please call the clinic at the number listed above.       Sincerely,        Rigo Goddard MD

## 2019-02-01 NOTE — PROGRESS NOTES
SUBJECTIVE:   Rubio Gibbons is a 73 year old male who presents to clinic today for the following health issues:    Medication Followup refills     Taking Medication as prescribed: yes    Side Effects:  None    Medication Helping Symptoms:  yes     Panlobular emphysema (H)  (primary encounter diagnosis)- no symptoms at rest. Down to a few cigarettes daily    Type 2 diabetes mellitus without complication, without long-term current use of insulin (H)-  Lab Results   Component Value Date    A1C 7.3 07/26/2018    A1C 6.7 09/25/2017    A1C 6.9 03/16/2017    A1C 6.5 09/22/2016    A1C 6.1 05/08/2014           Essential hypertension with goal blood pressure less than 140/90   BP Readings from Last 3 Encounters:   02/01/19 116/71   10/04/18 148/75   07/26/18 135/82       Impotence of organic origin- viagra expensive    Hypothyroidism due to acquired atrophy of thyroid-   TSH   Date Value Ref Range Status   07/26/2018 2.19 0.40 - 4.00 mU/L Final         Hyperlipidemia LDL goal <100-  On statin      Problem list and histories reviewed & adjusted, as indicated.  Additional history: as documented    Past Medical History:   Diagnosis Date     Abdominal aortic aneurysm (AAA) without rupture (H) 10/13/2016     CAD (coronary artery disease)      Diabetes mellitus type 2, uncomplicated (H) 9/22/2016    Diet      WIN (dyspnea on exertion) 11/21/2014     History of myocardial infarction in adulthood 9/22/2016     HTN, goal below 140/90 1/28/2014     Hyperlipidemia LDL goal <100 10/31/2010     Hypothyroidism 11/9/2011     Hypothyroidism due to acquired atrophy of thyroid 2/18/2016     Ischemic cardiomyopathy      LBP (low back pain) 11/21/2014     Microalbuminuria 11/7/2016    X1     Muscle spasm 9/25/2017     Other abnormal glucose 5/8/2014     Panlobular emphysema (H) 9/22/2016     Presbycusis syndrome, bilateral 9/22/2016     Right-sided low back pain without sciatica, unspecified chronicity 3/16/2017     Tobacco use disorder  "7/19/2006     Tubular adenoma        Past Surgical History:   Procedure Laterality Date     ANGIOPLASTY  1998     COLONOSCOPY N/A 11/17/2016    Procedure: COLONOSCOPY;  Surgeon: Adam Lantigua MD;  Location:  GI       Family History   Problem Relation Age of Onset     Cardiovascular Father         heart attack     Coronary Artery Disease Father      Cerebrovascular Disease Father      Cardiovascular Brother         heart attack     Cardiovascular Sister         2 sisters - minor cardiac problems- pt unsure of the exact nature       Social History     Tobacco Use     Smoking status: Current Some Day Smoker     Packs/day: 0.25     Years: 40.00     Pack years: 10.00     Types: Cigarettes     Smokeless tobacco: Never Used   Substance Use Topics     Alcohol use: No         Reviewed and updated as needed this visit by clinical staff  Tobacco  Allergies  Meds  Med Hx  Surg Hx  Fam Hx  Soc Hx      Reviewed and updated as needed this visit by Provider         ROS:  No cough CP edema    This document serves as a record of the services and decisions personally performed and made by Rigo Goddard MD. It was created on his behalf by Donovan Mendoza, a trained medical scribe. The creation of this document is based on the provider's statements to the medical scribe.  Donovan Mendoza February 1, 2019 2:06 PM      OBJECTIVE:     /71 (BP Location: Right arm, Patient Position: Chair, Cuff Size: Adult Large)   Pulse 60   Temp 98  F (36.7  C) (Oral)   Resp 16   Ht 1.676 m (5' 6\")   Wt 79.4 kg (175 lb)   SpO2 97%   BMI 28.25 kg/m    Body mass index is 28.25 kg/m .    RESP: lungs clear to auscultation - no rales, rhonchi or wheezes  No edema  Diagnostic Test Results:  No results found for this or any previous visit (from the past 24 hour(s)).    ASSESSMENT/PLAN:     (J43.1) Panlobular emphysema (H)  (primary encounter diagnosis)  Comment: Stable, refill complete smoking cessation urged  Plan: COPD ACTION PLAN, tiotropium " (SPIRIVA         HANDIHALER) 18 MCG inhaled capsule            (E11.9) Type 2 diabetes mellitus without complication, without long-term current use of insulin (H)  Comment: controlled at last measure  Plan: Hemoglobin A1c, Albumin Random Urine         Quantitative with Creat Ratio              (I10) Essential hypertension with goal blood pressure less than 140/90  Comment: controlled, refill  BP Readings from Last 3 Encounters:   02/01/19 116/71   10/04/18 148/75   07/26/18 135/82     Plan: metoprolol succinate ER (TOPROL-XL) 50 MG 24 hr        tablet, lisinopril (PRINIVIL/ZESTRIL) 5 MG         tablet                      (E03.4) Hypothyroidism due to acquired atrophy of thyroid  Comment: yearly TSH  Plan: levothyroxine (SYNTHROID/LEVOTHROID) 88 MCG         tablet          (E78.5) Hyperlipidemia LDL goal <100  Comment: treated  Plan: atorvastatin (LIPITOR) 40 MG tablet            (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 I have reviewed and approved this document for accuracy prior to leaving the patient care area.  February 1, 2019 2:06 PM    Rigo Goddard MD  [facility]

## 2019-02-01 NOTE — LETTER
My COPD Action Plan     Name: Rubio Gibbons    YOB: 1945   Date: 2/1/2019    My doctor: Rigo Goddard MD   My clinic: [address] [address] [phone]  My Controller Medicine:  spiriva   Dose:      My Rescue Medicine: { :218574}   Dose:      My Flare Up Medicine: { :[id number]}   Dose:  FEV-1 (no units)   Date Value   11/30/2015 90     FEV1/FVC (no units)   Date Value   11/30/2015 87      My COPD Severity: Mild = FeV1 > 80%      Use of Oxygen: Oxygen Not Prescribed      Make sure you've had your pneumonia   vaccines.          GREEN ZONE       Doing well today      Usual level of activity and exercise    Usual amount of cough and mucus    No shortness of breath    Usual level of health (thinking clearly, sleeping well, feel like eating) Actions:      Take daily medicines    Use oxygen as prescribed    Follow regular exercise and diet plan    Avoid cigarette smoke and other irritants that harm the lungs           YELLOW ZONE          Having a bad day or flare up      Short of breath more than usual    A lot more sputum (mucus) than usual    Sputum looks yellow, green, tan, brown or bloody    More coughing or wheezing    Fever or chills    Less energy; trouble completing activities    Trouble thinking or focusing    Using quick relief inhaler or nebulizer more often    Poor sleep; symptoms wake me up    Do not feel like eating Actions:      Get plenty of rest    Take daily medicines    Use quick relief inhaler every  hours    If you use oxygen, call you doctor to see if you should adjust your oxygen    Do breathing exercises or other things to help you relax    Let a loved one, friend or neighbor know you are feeling worse    Call your care team if you have 2 or more symptoms.  Start taking steroids or antibiotics if directed by your care team           RED ZONE       Need medical care now      Severe shortness of breath (feel you can't  breathe)    Fever, chills    Not enough breath to do any activity    Trouble coughing up mucus, walking or talking    Blood in mucus    Frequent coughing   Rescue medicines are not working    Not able to sleep because of breathing    Feel confused or drowsy    Chest pain    Actions:      Call your health care team.  If you cannot reach your care team, call 911 or go to the emergency room.        Annual Reminders:  Meet with Care Team, Flu Shot every Fall  Pharmacy: [facility] [address] [place] [address] [address] AT [place] [id number]

## 2019-02-02 LAB
CREAT UR-MCNC: 133 MG/DL
MICROALBUMIN UR-MCNC: 31 MG/L
MICROALBUMIN/CREAT UR: 23.61 MG/G CR (ref 0–17)

## 2019-03-07 ENCOUNTER — TELEPHONE (OUTPATIENT)
Dept: OTHER | Facility: CLINIC | Age: 74
End: 2019-03-07

## 2019-05-14 ENCOUNTER — OFFICE VISIT (OUTPATIENT)
Dept: URGENT CARE | Facility: URGENT CARE | Age: 74
End: 2019-05-14
Payer: COMMERCIAL

## 2019-05-14 VITALS
SYSTOLIC BLOOD PRESSURE: 124 MMHG | HEART RATE: 70 BPM | WEIGHT: 175 LBS | TEMPERATURE: 99.7 F | RESPIRATION RATE: 14 BRPM | OXYGEN SATURATION: 95 % | BODY MASS INDEX: 28.25 KG/M2 | DIASTOLIC BLOOD PRESSURE: 76 MMHG

## 2019-05-14 DIAGNOSIS — H83.01 INNER EAR INFLAMMATION, RIGHT: Primary | ICD-10-CM

## 2019-05-14 DIAGNOSIS — B36.9 FUNGAL INFECTION OF SKIN: ICD-10-CM

## 2019-05-14 PROCEDURE — 99213 OFFICE O/P EST LOW 20 MIN: CPT | Performed by: FAMILY MEDICINE

## 2019-05-14 RX ORDER — NEOMYCIN SULFATE, POLYMYXIN B SULFATE, HYDROCORTISONE 3.5; 10000; 1 MG/ML; [USP'U]/ML; MG/ML
3 SOLUTION/ DROPS AURICULAR (OTIC) 4 TIMES DAILY
Qty: 6 ML | Refills: 0 | Status: SHIPPED | OUTPATIENT
Start: 2019-05-14 | End: 2019-09-09

## 2019-05-14 NOTE — PROGRESS NOTES
Subjective:   Rubio Gibbons is a 74 year old male who presents for   Chief Complaint   Patient presents with     Urgent Care     Otalgia     Started Saturday morning right ear pain started, swollen on the ouside of ear      Ear pain started 3 days ago in the right side. He does wear hearing aids. No fevers experienced. ALso, the skin behind right ear is irritated and flaky. No remedies attempted      Patient Active Problem List    Diagnosis Date Noted     Gait disturbance 08/03/2018     Priority: Medium     Muscle spasm 09/25/2017     Priority: Medium     Right-sided low back pain without sciatica, unspecified chronicity 03/16/2017     Priority: Medium     Microalbuminuria 11/07/2016     Priority: Medium     X1       Abdominal aortic aneurysm (AAA) without rupture (H) 10/13/2016     Priority: Medium     Panlobular emphysema (H) 09/22/2016     Priority: Medium     History of myocardial infarction in adulthood 09/22/2016     Priority: Medium     Presbycusis syndrome, bilateral 09/22/2016     Priority: Medium     Diabetes mellitus type 2, uncomplicated (H) 09/22/2016     Priority: Medium     Diet         Hypothyroidism due to acquired atrophy of thyroid 02/18/2016     Priority: Medium     WIN (dyspnea on exertion) 11/21/2014     Priority: Medium     HTN, goal below 140/90 01/28/2014     Priority: Medium     Advanced directives, counseling/discussion 01/30/2013     Priority: Medium     Discussed advance care planning with patient; however, patient declined at this time. 1/30/2013          HYPERLIPIDEMIA LDL GOAL <100 10/31/2010     Priority: Medium     Tobacco use disorder 07/19/2006     Priority: Medium       Current Outpatient Medications   Medication     neomycin-polymyxin-hydrocortisone (CORTISPORIN) 3.5-22896-1 otic solution     ASPIRIN LOW DOSE 81 MG EC tablet     atorvastatin (LIPITOR) 40 MG tablet     levothyroxine (SYNTHROID/LEVOTHROID) 88 MCG tablet     lisinopril (PRINIVIL/ZESTRIL) 5 MG tablet     metoprolol  succinate ER (TOPROL-XL) 50 MG 24 hr tablet     sildenafil (VIAGRA) 100 MG tablet     tiotropium (SPIRIVA HANDIHALER) 18 MCG inhaled capsule     No current facility-administered medications for this visit.        ROS:  As above per HPI    Objective:   /76   Pulse 70   Temp 99.7  F (37.6  C) (Oral)   Resp 14   Wt 79.4 kg (175 lb)   SpO2 95%   BMI 28.25 kg/m  , Body mass index is 28.25 kg/m .  Gen:  NAD, well-nourished, sitting in chair comfortably  HEENT: EOMI, sclera anicteric, Head normocephalic, ; nares patent; moist mucous membranes, normal tm's bilterally, R ear canal shows inflammation without drainage, posterior section of R ear at attachment shows flaky skin  Neck: trachea midline, no thyromegaly  CV:  Hemodynamically stable  Pulm:  no increased work of breathing   Extrem: no cyanosis, edema or clubbing  Skin: no obvious rashes or abnormalities  Psych: Euthymic, linear thoughts, normal rate of speech        Assessment & Plan:   Rubio Gibbons, 74 year old male who presents with:  Inner ear inflammation, right  MIld ear canal inflammation -- will try cortisporin for 7-10 days.   - neomycin-polymyxin-hydrocortisone (CORTISPORIN) 3.5-60550-7 otic solution  Dispense: 6 mL; Refill: 0    Fungal infection of skin of R posterior ear  Lamisil twice daily for 2-3 weeks recommended      Nick Avila MD   [facility]    The use of Dragon/[employer] dictation services may have been used to construct the content in this note; any grammatical or spelling errors are non-intentional. Please contact the author of this note directly if you are in need of any clarification.

## 2019-05-14 NOTE — PATIENT INSTRUCTIONS
Lamisil cream twice a day to the back of the right ear. Apply for 2-3 weeks until resolved.   --Return if not better by early June      Use the ear drops cortisporin twice a day for 7-10 days until the discomfort comes down  --return as needed if no improvement

## 2019-09-10 ENCOUNTER — OFFICE VISIT (OUTPATIENT)
Dept: FAMILY MEDICINE | Facility: CLINIC | Age: 74
End: 2019-09-10
Payer: COMMERCIAL

## 2019-09-10 VITALS
RESPIRATION RATE: 16 BRPM | SYSTOLIC BLOOD PRESSURE: 136 MMHG | HEART RATE: 60 BPM | OXYGEN SATURATION: 95 % | BODY MASS INDEX: 27.32 KG/M2 | TEMPERATURE: 98.6 F | HEIGHT: 66 IN | DIASTOLIC BLOOD PRESSURE: 72 MMHG | WEIGHT: 170 LBS

## 2019-09-10 DIAGNOSIS — Z71.6 ENCOUNTER FOR SMOKING CESSATION COUNSELING: ICD-10-CM

## 2019-09-10 DIAGNOSIS — E11.9 TYPE 2 DIABETES MELLITUS WITHOUT COMPLICATION, WITHOUT LONG-TERM CURRENT USE OF INSULIN (H): Primary | ICD-10-CM

## 2019-09-10 DIAGNOSIS — E03.4 HYPOTHYROIDISM DUE TO ACQUIRED ATROPHY OF THYROID: ICD-10-CM

## 2019-09-10 DIAGNOSIS — I71.40 ABDOMINAL AORTIC ANEURYSM (AAA) WITHOUT RUPTURE (H): ICD-10-CM

## 2019-09-10 DIAGNOSIS — M54.16 LUMBAR RADICULOPATHY: ICD-10-CM

## 2019-09-10 DIAGNOSIS — E78.5 HYPERLIPIDEMIA LDL GOAL <100: ICD-10-CM

## 2019-09-10 DIAGNOSIS — I10 ESSENTIAL HYPERTENSION WITH GOAL BLOOD PRESSURE LESS THAN 140/90: ICD-10-CM

## 2019-09-10 LAB — HBA1C MFR BLD: 6.6 % (ref 0–5.6)

## 2019-09-10 PROCEDURE — 84439 ASSAY OF FREE THYROXINE: CPT | Performed by: FAMILY MEDICINE

## 2019-09-10 PROCEDURE — 99207 C FOOT EXAM  NO CHARGE: CPT | Performed by: FAMILY MEDICINE

## 2019-09-10 PROCEDURE — 80053 COMPREHEN METABOLIC PANEL: CPT | Performed by: FAMILY MEDICINE

## 2019-09-10 PROCEDURE — 83036 HEMOGLOBIN GLYCOSYLATED A1C: CPT | Performed by: FAMILY MEDICINE

## 2019-09-10 PROCEDURE — 36415 COLL VENOUS BLD VENIPUNCTURE: CPT | Performed by: FAMILY MEDICINE

## 2019-09-10 PROCEDURE — 80061 LIPID PANEL: CPT | Performed by: FAMILY MEDICINE

## 2019-09-10 PROCEDURE — 99214 OFFICE O/P EST MOD 30 MIN: CPT | Performed by: FAMILY MEDICINE

## 2019-09-10 PROCEDURE — 84443 ASSAY THYROID STIM HORMONE: CPT | Performed by: FAMILY MEDICINE

## 2019-09-10 RX ORDER — BUPROPION HYDROCHLORIDE 150 MG/1
150 TABLET ORAL EVERY MORNING
Qty: 7 TABLET | Refills: 0 | Status: SHIPPED | OUTPATIENT
Start: 2019-09-10 | End: 2019-10-09

## 2019-09-10 RX ORDER — LEVOTHYROXINE SODIUM 88 UG/1
88 TABLET ORAL DAILY
Qty: 90 TABLET | Refills: 0 | Status: SHIPPED | OUTPATIENT
Start: 2019-09-10 | End: 2019-11-15

## 2019-09-10 RX ORDER — ATORVASTATIN CALCIUM 40 MG/1
40 TABLET, FILM COATED ORAL DAILY
Qty: 90 TABLET | Refills: 0 | Status: SHIPPED | OUTPATIENT
Start: 2019-09-10 | End: 2019-11-15

## 2019-09-10 RX ORDER — METOPROLOL SUCCINATE 50 MG/1
50 TABLET, EXTENDED RELEASE ORAL DAILY
Qty: 90 TABLET | Refills: 0 | Status: SHIPPED | OUTPATIENT
Start: 2019-09-10 | End: 2019-11-15

## 2019-09-10 RX ORDER — BUPROPION HYDROCHLORIDE 300 MG/1
300 TABLET ORAL EVERY MORNING
Qty: 90 TABLET | Refills: 0 | Status: SHIPPED | OUTPATIENT
Start: 2019-09-10 | End: 2019-11-15

## 2019-09-10 RX ORDER — LISINOPRIL 5 MG/1
TABLET ORAL
Qty: 90 TABLET | Refills: 0 | Status: SHIPPED | OUTPATIENT
Start: 2019-09-10 | End: 2019-11-15

## 2019-09-10 ASSESSMENT — MIFFLIN-ST. JEOR: SCORE: 1448.86

## 2019-09-10 NOTE — PROGRESS NOTES
Subjective     Rubio Gibbons is a 74 year old male who presents to clinic today for the following health issues:    HPI   Medication Followup     Taking Medication as prescribed: yes    Side Effects:  None    Medication Helping Symptoms:  yes     Would like to discuss Smoking Cessation     Type 2 diabetes mellitus without complication, without long-term current use of insulin (H)  (primary encounter diagnosis) to be measured  Hyperlipidemia LDL goal <100 on statin  Hypothyroidism due to acquired atrophy of thyroid yearly TSH  Essential hypertension with goal blood pressure less than 140/90   BP Readings from Last 1 Encounters:   09/10/19 136/72       Encounter for smoking cessation counseling he has decided he would like to quit  Lumbar radiculopathy R back pain radiates to foot. Previously imaged  Abdominal aortic aneurysm (AAA) without rupture (H) yearly measure    Past Medical History:   Diagnosis Date     Abdominal aortic aneurysm (AAA) without rupture (H) 10/13/2016     CAD (coronary artery disease)      Diabetes mellitus type 2, uncomplicated (H) 9/22/2016    Diet      WIN (dyspnea on exertion) 11/21/2014     History of myocardial infarction in adulthood 9/22/2016     HTN, goal below 140/90 1/28/2014     Hyperlipidemia LDL goal <100 10/31/2010     Hypothyroidism 11/9/2011     Hypothyroidism due to acquired atrophy of thyroid 2/18/2016     Ischemic cardiomyopathy      LBP (low back pain) 11/21/2014     Lumbar radiculopathy 9/10/2019     Microalbuminuria 11/7/2016    X1     Muscle spasm 9/25/2017     Other abnormal glucose 5/8/2014     Panlobular emphysema (H) 9/22/2016     Presbycusis syndrome, bilateral 9/22/2016     Right-sided low back pain without sciatica, unspecified chronicity 3/16/2017     Tobacco use disorder 7/19/2006     Tubular adenoma      Past Surgical History:   Procedure Laterality Date     ANGIOPLASTY  1998     COLONOSCOPY N/A 11/17/2016    Procedure: COLONOSCOPY;  Surgeon: Adam Lantigua,  "MD;  Location: [facility]     Family History   Problem Relation Age of Onset     Cardiovascular Father         heart attack     Coronary Artery Disease Father      Cerebrovascular Disease Father      Cardiovascular Brother         heart attack     Cardiovascular Sister         2 sisters - minor cardiac problems- pt unsure of the exact nature     Social History     Tobacco Use     Smoking status: Current Some Day Smoker     Packs/day: 0.25     Years: 40.00     Pack years: 10.00     Types: Cigarettes     Smokeless tobacco: Never Used   Substance Use Topics     Alcohol use: No     Reviewed and updated as needed this visit by Provider       Review of Systems   ROS COMP: Constitutional, HEENT, cardiovascular, pulmonary, gi and gu systems are negative, except as otherwise noted.      Objective    /72 (BP Location: Right arm, Patient Position: Chair, Cuff Size: Adult Large)   Pulse 60   Temp 98.6  F (37  C) (Oral)   Resp 16   Ht 1.676 m (5' 6\")   Wt 77.1 kg (170 lb)   SpO2 95%   BMI 27.44 kg/m    Body mass index is 27.44 kg/m .  Physical Exam   GENERAL: healthy, alert and no distress  MS: no gross musculoskeletal defects noted, no antalgia, neg SLR  PSYCH: mentation appears normal, affect normal/bright  Diabetic foot exam: no trophic changes or ulcerative lesions, normal sensory exam and no pulses noted but skin is warm and dry.     Diagnostic Test Results:  Labs reviewed in Epic  Results for orders placed or performed in visit on 09/10/19 (from the past 24 hour(s))   HEMOGLOBIN A1C   Result Value Ref Range    Hemoglobin A1C 6.6 (H) 0 - 5.6 %           Assessment & Plan   Problem List Items Addressed This Visit        Nervous and Auditory    Lumbar radiculopathy     Two level foraminal stenosis on last imaging. MR spine and spine surgery referral placed.         Relevant Medications    buPROPion (WELLBUTRIN XL) 150 MG 24 hr tablet    buPROPion (WELLBUTRIN XL) 300 MG 24 hr tablet    Other Relevant Orders    MR " Lumbar Spine w/o Contrast    SPINE SURGERY REFERRAL       Endocrine    HYPERLIPIDEMIA LDL GOAL <100     Discussed. statin    Recent Labs   Lab Test 07/26/18  0950 09/25/17  0857  11/21/14  0956 05/08/14  1002   CHOL 130 138   < > 119 115   HDL 34* 35*   < > 34* 29*   LDL 72 71   < > 60 71   TRIG 121 161*   < > 125 77   CHOLHDLRATIO  --   --   --  3.5 4.0    < > = values in this interval not displayed.              Relevant Medications    atorvastatin (LIPITOR) 40 MG tablet    Other Relevant Orders    Lipid panel reflex to direct LDL Non-fasting (Completed)    Hypothyroidism due to acquired atrophy of thyroid     Discussed. TSH due.    TSH   Date Value Ref Range Status   07/26/2018 2.19 0.40 - 4.00 mU/L Final   09/25/2017 1.62 0.40 - 4.00 mU/L Final   09/22/2016 1.55 0.40 - 4.00 mU/L Final   02/18/2016 1.26 0.40 - 4.00 mU/L Final   05/19/2015 0.65 0.40 - 4.00 mU/L Final            Relevant Medications    levothyroxine (SYNTHROID/LEVOTHROID) 88 MCG tablet    Diabetes mellitus type 2, uncomplicated (H) - Primary     Discussed. Will check A1C today. Diabetic foot exam: no trophic changes or ulcerative lesions, normal sensory exam and no pulses noted but skin is warm and dry. Continue current regimen.     Lab Results   Component Value Date    A1C 7.2 02/01/2019    A1C 7.3 07/26/2018    A1C 6.7 09/25/2017    A1C 6.9 03/16/2017    A1C 6.5 09/22/2016     Wt Readings from Last 4 Encounters:   09/10/19 77.1 kg (170 lb)   05/14/19 79.4 kg (175 lb)   02/01/19 79.4 kg (175 lb)   10/04/18 79.4 kg (175 lb)              Relevant Medications    levothyroxine (SYNTHROID/LEVOTHROID) 88 MCG tablet    Other Relevant Orders    COMPREHENSIVE METABOLIC PANEL (Completed)    TSH WITH FREE T4 REFLEX (Completed)    HEMOGLOBIN A1C (Completed)    OPHTHALMOLOGY ADULT REFERRAL    FOOT EXAM (Completed)       Circulatory    Essential hypertension with goal blood pressure less than 140/90    Relevant Medications    lisinopril (PRINIVIL/ZESTRIL) 5 MG  "tablet    metoprolol succinate ER (TOPROL-XL) 50 MG 24 hr tablet    Abdominal aortic aneurysm (AAA) without rupture (H)     Stable at last measure.          Relevant Orders    US Abdominal Aorta Imaging       Behavioral    Encounter for smoking cessation counseling     [facility] GI upset. Bupropion. Discussed behavioral changes         Relevant Medications    buPROPion (WELLBUTRIN XL) 150 MG 24 hr tablet    buPROPion (WELLBUTRIN XL) 300 MG 24 hr tablet         Tobacco Cessation:   reports that he has been smoking cigarettes.  He has a 10.00 pack-year smoking history. He has never used smokeless tobacco.  Tobacco Cessation Action Plan: Pharmacotherapies : Zyban/Wellbutrin    BMI:   Estimated body mass index is 27.44 kg/m  as calculated from the following:    Height as of this encounter: 1.676 m (5' 6\").    Weight as of this encounter: 77.1 kg (170 lb).     Return in about 6 weeks (around 10/22/2019).    Scribe Disclosure:   I, Edith Barry, am serving as a scribe; to document services personally performed by Dr. Rigo Goddard MD - -based on data collection and the provider's statements to me.     Provider Disclosure:  I agree with above History, Review of Systems, Physical exam and Plan.  I have reviewed the content of the documentation and have edited it as needed. I have personally performed the services documented here and the documentation accurately represents those services and the decisions I have made.      Electronically signed by:  Rigo Goddard MD  [facility]    "

## 2019-09-10 NOTE — ASSESSMENT & PLAN NOTE
Discussed. TSH due.    TSH   Date Value Ref Range Status   07/26/2018 2.19 0.40 - 4.00 mU/L Final   09/25/2017 1.62 0.40 - 4.00 mU/L Final   09/22/2016 1.55 0.40 - 4.00 mU/L Final   02/18/2016 1.26 0.40 - 4.00 mU/L Final   05/19/2015 0.65 0.40 - 4.00 mU/L Final

## 2019-09-10 NOTE — PATIENT INSTRUCTIONS
Take 150 mg of bupropion (Wellbutrin) for the first 7 days. Then increase to 300 mg daily for 90 days.     Choose something of interest to occupy your mouth once you quit smoking.    Flu shot end of October.

## 2019-09-10 NOTE — ASSESSMENT & PLAN NOTE
Discussed. Will check A1C today. Diabetic foot exam: no trophic changes or ulcerative lesions, normal sensory exam and no pulses noted but skin is warm and dry. Continue current regimen.     Lab Results   Component Value Date    A1C 7.2 02/01/2019    A1C 7.3 07/26/2018    A1C 6.7 09/25/2017    A1C 6.9 03/16/2017    A1C 6.5 09/22/2016     Wt Readings from Last 4 Encounters:   09/10/19 77.1 kg (170 lb)   05/14/19 79.4 kg (175 lb)   02/01/19 79.4 kg (175 lb)   10/04/18 79.4 kg (175 lb)

## 2019-09-10 NOTE — ASSESSMENT & PLAN NOTE
Discussed. statin    Recent Labs   Lab Test 07/26/18  0950 09/25/17  0857  11/21/14  0956 05/08/14  1002   CHOL 130 138   < > 119 115   HDL 34* 35*   < > 34* 29*   LDL 72 71   < > 60 71   TRIG 121 161*   < > 125 77   CHOLHDLRATIO  --   --   --  3.5 4.0    < > = values in this interval not displayed.

## 2019-09-11 LAB
ALBUMIN SERPL-MCNC: 4.1 G/DL (ref 3.4–5)
ALP SERPL-CCNC: 76 U/L (ref 40–150)
ALT SERPL W P-5'-P-CCNC: 19 U/L (ref 0–70)
ANION GAP SERPL CALCULATED.3IONS-SCNC: 5 MMOL/L (ref 3–14)
AST SERPL W P-5'-P-CCNC: 20 U/L (ref 0–45)
BILIRUB SERPL-MCNC: 0.6 MG/DL (ref 0.2–1.3)
BUN SERPL-MCNC: 16 MG/DL (ref 7–30)
CALCIUM SERPL-MCNC: 9 MG/DL (ref 8.5–10.1)
CHLORIDE SERPL-SCNC: 102 MMOL/L (ref 94–109)
CHOLEST SERPL-MCNC: 140 MG/DL
CO2 SERPL-SCNC: 29 MMOL/L (ref 20–32)
CREAT SERPL-MCNC: 1.06 MG/DL (ref 0.66–1.25)
GFR SERPL CREATININE-BSD FRML MDRD: 68 ML/MIN/{1.73_M2}
GLUCOSE SERPL-MCNC: 104 MG/DL (ref 70–99)
HDLC SERPL-MCNC: 33 MG/DL
LDLC SERPL CALC-MCNC: 68 MG/DL
NONHDLC SERPL-MCNC: 107 MG/DL
POTASSIUM SERPL-SCNC: 4.7 MMOL/L (ref 3.4–5.3)
PROT SERPL-MCNC: 7.2 G/DL (ref 6.8–8.8)
SODIUM SERPL-SCNC: 136 MMOL/L (ref 133–144)
T4 FREE SERPL-MCNC: 1.08 NG/DL (ref 0.76–1.46)
TRIGL SERPL-MCNC: 194 MG/DL
TSH SERPL DL<=0.005 MIU/L-ACNC: 5.35 MU/L (ref 0.4–4)

## 2019-10-10 ENCOUNTER — OFFICE VISIT (OUTPATIENT)
Dept: FAMILY MEDICINE | Facility: CLINIC | Age: 74
End: 2019-10-10
Payer: COMMERCIAL

## 2019-10-10 VITALS
TEMPERATURE: 98.4 F | SYSTOLIC BLOOD PRESSURE: 128 MMHG | OXYGEN SATURATION: 94 % | BODY MASS INDEX: 30 KG/M2 | HEIGHT: 63 IN | HEART RATE: 55 BPM | DIASTOLIC BLOOD PRESSURE: 81 MMHG | WEIGHT: 169.3 LBS | RESPIRATION RATE: 14 BRPM

## 2019-10-10 DIAGNOSIS — E78.5 HYPERLIPIDEMIA LDL GOAL <100: ICD-10-CM

## 2019-10-10 DIAGNOSIS — Z01.812 PRE-OPERATIVE LABORATORY EXAMINATION: ICD-10-CM

## 2019-10-10 DIAGNOSIS — J43.1 PANLOBULAR EMPHYSEMA (H): ICD-10-CM

## 2019-10-10 DIAGNOSIS — M51.369 DDD (DEGENERATIVE DISC DISEASE), LUMBAR: ICD-10-CM

## 2019-10-10 DIAGNOSIS — Z01.818 PRE-OP EXAM: Primary | ICD-10-CM

## 2019-10-10 DIAGNOSIS — E11.9 TYPE 2 DIABETES MELLITUS WITHOUT COMPLICATION, WITHOUT LONG-TERM CURRENT USE OF INSULIN (H): ICD-10-CM

## 2019-10-10 DIAGNOSIS — I10 ESSENTIAL HYPERTENSION WITH GOAL BLOOD PRESSURE LESS THAN 140/90: ICD-10-CM

## 2019-10-10 DIAGNOSIS — Z01.818 PREOP GENERAL PHYSICAL EXAM: ICD-10-CM

## 2019-10-10 LAB
APTT PPP: 34 SEC (ref 22–37)
BASOPHILS # BLD AUTO: 0 10E9/L (ref 0–0.2)
BASOPHILS NFR BLD AUTO: 0.5 %
DIFFERENTIAL METHOD BLD: NORMAL
EOSINOPHIL # BLD AUTO: 0.1 10E9/L (ref 0–0.7)
EOSINOPHIL NFR BLD AUTO: 1.7 %
ERYTHROCYTE [DISTWIDTH] IN BLOOD BY AUTOMATED COUNT: 13.9 % (ref 10–15)
HCT VFR BLD AUTO: 44.8 % (ref 40–53)
HGB BLD-MCNC: 14.7 G/DL (ref 13.3–17.7)
INR PPP: 1.13 (ref 0.86–1.14)
LYMPHOCYTES # BLD AUTO: 1.7 10E9/L (ref 0.8–5.3)
LYMPHOCYTES NFR BLD AUTO: 22.8 %
MCH RBC QN AUTO: 32 PG (ref 26.5–33)
MCHC RBC AUTO-ENTMCNC: 32.8 G/DL (ref 31.5–36.5)
MCV RBC AUTO: 98 FL (ref 78–100)
MONOCYTES # BLD AUTO: 0.6 10E9/L (ref 0–1.3)
MONOCYTES NFR BLD AUTO: 8.6 %
MRSA DNA SPEC QL NAA+PROBE: NEGATIVE
NEUTROPHILS # BLD AUTO: 5 10E9/L (ref 1.6–8.3)
NEUTROPHILS NFR BLD AUTO: 66.4 %
PLATELET # BLD AUTO: 189 10E9/L (ref 150–450)
RBC # BLD AUTO: 4.59 10E12/L (ref 4.4–5.9)
SPECIMEN SOURCE: NORMAL
WBC # BLD AUTO: 7.5 10E9/L (ref 4–11)

## 2019-10-10 PROCEDURE — 85730 THROMBOPLASTIN TIME PARTIAL: CPT | Performed by: PHYSICIAN ASSISTANT

## 2019-10-10 PROCEDURE — 85025 COMPLETE CBC W/AUTO DIFF WBC: CPT | Performed by: PHYSICIAN ASSISTANT

## 2019-10-10 PROCEDURE — 93000 ELECTROCARDIOGRAM COMPLETE: CPT | Performed by: PHYSICIAN ASSISTANT

## 2019-10-10 PROCEDURE — 87641 MR-STAPH DNA AMP PROBE: CPT | Performed by: NEUROLOGICAL SURGERY

## 2019-10-10 PROCEDURE — 87640 STAPH A DNA AMP PROBE: CPT | Mod: 59 | Performed by: NEUROLOGICAL SURGERY

## 2019-10-10 PROCEDURE — 36415 COLL VENOUS BLD VENIPUNCTURE: CPT | Performed by: PHYSICIAN ASSISTANT

## 2019-10-10 PROCEDURE — 99215 OFFICE O/P EST HI 40 MIN: CPT | Performed by: PHYSICIAN ASSISTANT

## 2019-10-10 PROCEDURE — 82947 ASSAY GLUCOSE BLOOD QUANT: CPT | Performed by: PHYSICIAN ASSISTANT

## 2019-10-10 PROCEDURE — 85610 PROTHROMBIN TIME: CPT | Performed by: PHYSICIAN ASSISTANT

## 2019-10-10 SDOH — HEALTH STABILITY: MENTAL HEALTH: HOW MANY STANDARD DRINKS CONTAINING ALCOHOL DO YOU HAVE ON A TYPICAL DAY?: PATIENT DECLINED

## 2019-10-10 SDOH — ECONOMIC STABILITY: FOOD INSECURITY: WITHIN THE PAST 12 MONTHS, YOU WORRIED THAT YOUR FOOD WOULD RUN OUT BEFORE YOU GOT MONEY TO BUY MORE.: NEVER TRUE

## 2019-10-10 SDOH — HEALTH STABILITY: PHYSICAL HEALTH: ON AVERAGE, HOW MANY DAYS PER WEEK DO YOU ENGAGE IN MODERATE TO STRENUOUS EXERCISE (LIKE A BRISK WALK)?: 2 DAYS

## 2019-10-10 SDOH — HEALTH STABILITY: PHYSICAL HEALTH: ON AVERAGE, HOW MANY MINUTES DO YOU ENGAGE IN EXERCISE AT THIS LEVEL?: 20 MIN

## 2019-10-10 SDOH — HEALTH STABILITY: MENTAL HEALTH
STRESS IS WHEN SOMEONE FEELS TENSE, NERVOUS, ANXIOUS, OR CAN'T SLEEP AT NIGHT BECAUSE THEIR MIND IS TROUBLED. HOW STRESSED ARE YOU?: ONLY A LITTLE

## 2019-10-10 SDOH — HEALTH STABILITY: MENTAL HEALTH: HOW OFTEN DO YOU HAVE 6 OR MORE DRINKS ON ONE OCCASION?: NEVER

## 2019-10-10 SDOH — SOCIAL STABILITY: SOCIAL NETWORK: IN A TYPICAL WEEK, HOW MANY TIMES DO YOU TALK ON THE PHONE WITH FAMILY, FRIENDS, OR NEIGHBORS?: THREE TIMES A WEEK

## 2019-10-10 SDOH — SOCIAL STABILITY: SOCIAL NETWORK: HOW OFTEN DO YOU ATTEND CHURCH OR RELIGIOUS SERVICES?: NEVER

## 2019-10-10 SDOH — ECONOMIC STABILITY: FOOD INSECURITY: WITHIN THE PAST 12 MONTHS, THE FOOD YOU BOUGHT JUST DIDN'T LAST AND YOU DIDN'T HAVE MONEY TO GET MORE.: NEVER TRUE

## 2019-10-10 SDOH — SOCIAL STABILITY: SOCIAL NETWORK
DO YOU BELONG TO ANY CLUBS OR ORGANIZATIONS SUCH AS CHURCH GROUPS UNIONS, FRATERNAL OR ATHLETIC GROUPS, OR SCHOOL GROUPS?: NO

## 2019-10-10 SDOH — ECONOMIC STABILITY: INCOME INSECURITY: HOW HARD IS IT FOR YOU TO PAY FOR THE VERY BASICS LIKE FOOD, HOUSING, MEDICAL CARE, AND HEATING?: NOT HARD AT ALL

## 2019-10-10 SDOH — SOCIAL STABILITY: SOCIAL NETWORK: ARE YOU MARRIED, WIDOWED, DIVORCED, SEPARATED, NEVER MARRIED, OR LIVING WITH A PARTNER?: MARRIED

## 2019-10-10 SDOH — SOCIAL STABILITY: SOCIAL NETWORK: HOW OFTEN DO YOU ATTENT MEETINGS OF THE CLUB OR ORGANIZATION YOU BELONG TO?: NEVER

## 2019-10-10 SDOH — ECONOMIC STABILITY: TRANSPORTATION INSECURITY
IN THE PAST 12 MONTHS, HAS LACK OF TRANSPORTATION KEPT YOU FROM MEETINGS, WORK, OR FROM GETTING THINGS NEEDED FOR DAILY LIVING?: NO

## 2019-10-10 SDOH — ECONOMIC STABILITY: TRANSPORTATION INSECURITY
IN THE PAST 12 MONTHS, HAS THE LACK OF TRANSPORTATION KEPT YOU FROM MEDICAL APPOINTMENTS OR FROM GETTING MEDICATIONS?: NO

## 2019-10-10 SDOH — HEALTH STABILITY: MENTAL HEALTH: HOW OFTEN DO YOU HAVE A DRINK CONTAINING ALCOHOL?: NEVER

## 2019-10-10 SDOH — SOCIAL STABILITY: SOCIAL NETWORK: HOW OFTEN DO YOU GET TOGETHER WITH FRIENDS OR RELATIVES?: TWICE A WEEK

## 2019-10-10 ASSESSMENT — MIFFLIN-ST. JEOR: SCORE: 1398.07

## 2019-10-10 NOTE — PROGRESS NOTES
[facility]  [address] [facility] [address]  [phone]  Dept: [phone]    PRE-OP EVALUATION:  Today's date: 10/10/2019    Rubio Gibbons (: 1945) presents for pre-operative evaluation assessment as requested by Dr. Haq.  He requires evaluation and anesthesia risk assessment prior to undergoing surgery/procedure for treatment of fuse back, degenerative disk .    Fax number for surgical facility: [facility]  Primary Physician: Rigo Goddard  Type of Anesthesia Anticipated: General    Patient has a Health Care Directive or Living Will:  NO    Preop Questions 10/10/2019   Who is doing your surgery? dr Haq   What are you having done? [facility]   1.  Do you have a history of Heart attack, stroke, stent, coronary bypass surgery, or other heart surgery? YES  -    2.  Do you ever have any pain or discomfort in your chest? No   3.  Do you have a history of  Heart Failure? No   4.   Are you troubled by shortness of breath when:  walking on a level surface, or up a slight hill, or at night? YES -    5.  Do you currently have a cold, bronchitis or other respiratory infection? No   6.  Do you have a cough, shortness of breath, or wheezing? YES - smokers cough. Stable. At baseline   7.  Do you sometimes get pains in the calves of your legs when you walk? No   8. Do you or anyone in your family have previous history of blood clots? No   9.  Do you or does anyone in your family have a serious bleeding problem such as prolonged bleeding following surgeries or cuts? No   10. Have you ever had problems with anemia or been told to take iron pills? No   11. Have you had any abnormal blood loss such as black, tarry or bloody stools? No   12. Have you ever had a blood transfusion? No   13. Have you or any of your relatives ever had problems with anesthesia? No   14. Do you have sleep apnea, excessive snoring or daytime drowsiness? YES -     15. Do you have any prosthetic heart valves? No   16. Do you have prosthetic joints? No         HPI:     HPI related to upcoming procedure: history of ddd. The above procedure was deemed the next best step in management.       See problem list for active medical problems.  Problems all longstanding and stable, except as noted/documented.  See ROS for pertinent symptoms related to these conditions.    HYPERLIPIDEMIA - Patient has a long history of significant Hyperlipidemia requiring medication for treatment with recent good control. Patient reports no problems or side effects with the medication.     HYPERTENSION - Patient has longstanding history of HTN , currently denies any symptoms referable to elevated blood pressure. Specifically denies chest pain, palpitations, dyspnea, orthopnea, PND or peripheral edema. Blood pressure readings have been in normal range. Current medication regimen is as listed below. Patient denies any side effects of medication.     HYPOTHYROIDISM - Patient has a longstanding history of chronic Hypothyroidism. Patient has been doing well, noting no tremor, insomnia, hair loss or changes in skin texture. Continues to take medications as directed, without adverse reactions or side effects. Last TSH   Lab Results   Component Value Date    TSH 5.35 (H) 09/10/2019   .      COPD history. No inhalers needed. Stable. Admits to mild chronic cough at baseline. No chest pain or shortness of breath with exercise or at baseline. Currently working on quitting smoking.   MEDICAL HISTORY:     Patient Active Problem List    Diagnosis Date Noted     DDD (degenerative disc disease), lumbar 10/10/2019     Priority: Medium     Lumbar radiculopathy 09/10/2019     Priority: Medium     Encounter for smoking cessation counseling 09/10/2019     Priority: Medium     Gait disturbance 08/03/2018     Priority: Medium     Muscle spasm 09/25/2017     Priority: Medium     Right-sided low back pain without sciatica,  unspecified chronicity 03/16/2017     Priority: Medium     Microalbuminuria 11/07/2016     Priority: Medium     X1       Abdominal aortic aneurysm (AAA) without rupture (H) 10/13/2016     Priority: Medium     Stable at last measure.        Panlobular emphysema (H) 09/22/2016     Priority: Medium     History of myocardial infarction in adulthood 09/22/2016     Priority: Medium     Presbycusis syndrome, bilateral 09/22/2016     Priority: Medium     Diabetes mellitus type 2, uncomplicated (H) 09/22/2016     Priority: Medium     Diet         Hypothyroidism due to acquired atrophy of thyroid 02/18/2016     Priority: Medium     WIN (dyspnea on exertion) 11/21/2014     Priority: Medium     Essential hypertension with goal blood pressure less than 140/90 01/28/2014     Priority: Medium     Advanced directives, counseling/discussion 01/30/2013     Priority: Medium     Discussed advance care planning with patient; however, patient declined at this time. 1/30/2013          HYPERLIPIDEMIA LDL GOAL <100 10/31/2010     Priority: Medium     Tobacco use disorder 07/19/2006     Priority: Medium      Past Medical History:   Diagnosis Date     Abdominal aortic aneurysm (AAA) without rupture (H) 10/13/2016     CAD (coronary artery disease)      Diabetes mellitus type 2, uncomplicated (H) 9/22/2016    Diet      WIN (dyspnea on exertion) 11/21/2014     Heart attack (H) 1998     History of myocardial infarction in adulthood 9/22/2016     HTN, goal below 140/90 1/28/2014     Hyperlipidemia LDL goal <100 10/31/2010     Hypothyroidism 11/9/2011     Hypothyroidism due to acquired atrophy of thyroid 2/18/2016     Ischemic cardiomyopathy      LBP (low back pain) 11/21/2014     Lumbar radiculopathy 9/10/2019     Microalbuminuria 11/7/2016    X1     Muscle spasm 9/25/2017     Other abnormal glucose 5/8/2014     Panlobular emphysema (H) 9/22/2016     Presbycusis syndrome, bilateral 9/22/2016     Right-sided low back pain without sciatica,  unspecified chronicity 3/16/2017     Tobacco use disorder 7/19/2006     Tubular adenoma      Past Surgical History:   Procedure Laterality Date     ANGIOPLASTY  1998     COLONOSCOPY N/A 11/17/2016    Procedure: COLONOSCOPY;  Surgeon: Adam Lantigua MD;  Location:  GI     Current Outpatient Medications   Medication Sig Dispense Refill     ASPIRIN LOW DOSE 81 MG EC tablet TAKE 1 TABLET BY MOUTH DAILY 100 tablet 1     atorvastatin (LIPITOR) 40 MG tablet Take 1 tablet (40 mg) by mouth daily 90 tablet 0     buPROPion (WELLBUTRIN XL) 300 MG 24 hr tablet Take 1 tablet (300 mg) by mouth every morning 90 tablet 0     levothyroxine (SYNTHROID/LEVOTHROID) 88 MCG tablet Take 1 tablet (88 mcg) by mouth daily 90 tablet 0     lisinopril (PRINIVIL/ZESTRIL) 5 MG tablet TAKE ONE TABLET BY MOUTH ONCE DAILY 90 tablet 0     metoprolol succinate ER (TOPROL-XL) 50 MG 24 hr tablet Take 1 tablet (50 mg) by mouth daily 90 tablet 00     OTC products: Aspirin    Allergies   Allergen Reactions     Chantix [Varenicline] GI Disturbance      Latex Allergy: NO    Social History     Tobacco Use     Smoking status: Current Some Day Smoker     Packs/day: 0.25     Years: 40.00     Pack years: 10.00     Types: Cigarettes     Smokeless tobacco: Never Used     Tobacco comment: 5 cigarettes per day/not every day   Substance Use Topics     Alcohol use: No     Frequency: Never     Drinks per session: Patient refused     Binge frequency: Never     History   Drug Use No       REVIEW OF SYSTEMS:   CONSTITUTIONAL: NEGATIVE for fever, chills, change in weight  INTEGUMENTARY/SKIN: NEGATIVE for worrisome rashes, moles or lesions  EYES: NEGATIVE for vision changes or irritation  ENT/MOUTH: NEGATIVE for ear, mouth and throat problems  RESP: NEGATIVE for significant cough or SOB  BREAST: NEGATIVE for masses, tenderness or discharge  CV: NEGATIVE for chest pain, palpitations or peripheral edema  GI: NEGATIVE for nausea, abdominal pain, heartburn, or change in  "bowel habits  : NEGATIVE for frequency, dysuria, or hematuria  MUSCULOSKELETAL: ongoing low back pain, otherwise, NEGATIVE for significant arthralgias or myalgia  NEURO: NEGATIVE for weakness, dizziness or paresthesias  ENDOCRINE: NEGATIVE for temperature intolerance, skin/hair changes  HEME: NEGATIVE for bleeding problems  PSYCHIATRIC: NEGATIVE for changes in mood or affect    EXAM:   /81 (BP Location: Right arm, Patient Position: Chair, Cuff Size: Adult Regular)   Pulse 55   Temp 98.4  F (36.9  C) (Oral)   Resp 14   Ht 1.6 m (5' 3\")   Wt 76.8 kg (169 lb 4.8 oz)   SpO2 94%   BMI 29.99 kg/m      GENERAL APPEARANCE: healthy, alert and no distress     EYES: EOMI,  PERRL     HENT: ear canals and TM's normal and nose and mouth without ulcers or lesions     NECK: no adenopathy, no asymmetry, masses, or scars and thyroid normal to palpation     RESP: lungs clear to auscultation - no rales, rhonchi or wheezes     CV: regular rates and rhythm, normal S1 S2, no S3 or S4 and no murmur, click or rub     ABDOMEN:  soft, nontender, no HSM or masses and bowel sounds normal     MS: extremities normal- no gross deformities noted, no evidence of inflammation in joints, FROM in all extremities.     SKIN: no suspicious lesions or rashes     NEURO: Normal strength and tone, sensory exam grossly normal, mentation intact and speech normal     PSYCH: mentation appears normal. and affect normal/bright     LYMPHATICS: No cervical adenopathy    DIAGNOSTICS:     EKG: sinus bradycardia, normal axis, normal intervals, no acute ST/T changes c/w ischemia, no LVH by voltage criteria  Labs Resulted Today:   Results for orders placed or performed in visit on 10/10/19   CBC with platelets and differential   Result Value Ref Range    WBC 7.5 4.0 - 11.0 10e9/L    RBC Count 4.59 4.4 - 5.9 10e12/L    Hemoglobin 14.7 13.3 - 17.7 g/dL    Hematocrit 44.8 40.0 - 53.0 %    MCV 98 78 - 100 fl    MCH 32.0 26.5 - 33.0 pg    MCHC 32.8 31.5 - 36.5 " g/dL    RDW 13.9 10.0 - 15.0 %    Platelet Count 189 150 - 450 10e9/L    % Neutrophils 66.4 %    % Lymphocytes 22.8 %    % Monocytes 8.6 %    % Eosinophils 1.7 %    % Basophils 0.5 %    Absolute Neutrophil 5.0 1.6 - 8.3 10e9/L    Absolute Lymphocytes 1.7 0.8 - 5.3 10e9/L    Absolute Monocytes 0.6 0.0 - 1.3 10e9/L    Absolute Eosinophils 0.1 0.0 - 0.7 10e9/L    Absolute Basophils 0.0 0.0 - 0.2 10e9/L    Diff Method Automated Method      Labs Drawn and in Process:   Unresulted Labs Ordered in the Past 30 Days of this Admission     Date and Time Order Name Status Description    10/10/2019 1039 PARTIAL THROMBOPLASTIN TIME In process     10/10/2019 1039 INR In process     10/10/2019 1039 GLUCOSE In process     10/10/2019 1036 METHICILLIN RESIST/SENS S. AUREUS PCR In process     9/10/2019 1645 T4 FREE In process           Recent Labs   Lab Test 09/10/19  1645 02/01/19  1434 07/26/18  0950  01/30/13  1019   HGB  --   --   --   --  15.5   PLT  --   --   --   --  206     --  139   < > 140   POTASSIUM 4.7  --  4.7   < > 4.7   CR 1.06  --  0.91   < > 0.76   A1C 6.6* 7.2* 7.3*   < > 6.5*    < > = values in this interval not displayed.        IMPRESSION:   Reason for surgery/procedure: DDD  Diagnosis/reason for consult: preoperative exam for the above procedure.     The proposed surgical procedure is considered INTERMEDIATE risk.    REVISED CARDIAC RISK INDEX  The patient has the following serious cardiovascular risks for perioperative complications such as (MI, PE, VFib and 3  AV Block):  No serious cardiac risks  INTERPRETATION: 0 risks: Class I (very low risk - 0.4% complication rate)    The patient has the following additional risks for perioperative complications:  No identified additional risks      ICD-10-CM    1. Pre-op exam Z01.818 EKG 12-lead complete w/read - Clinics   2. DDD (degenerative disc disease), lumbar M51.36    3. Preop general physical exam Z01.818 Glucose     CBC with platelets and differential      INR     Partial thromboplastin time   4. Pre-operative laboratory examination Z01.812 Methicillin Resist/Sens S. aureus PCR   5. Panlobular emphysema (H) J43.1    6. Type 2 diabetes mellitus without complication, without long-term current use of insulin (H) E11.9    7. Essential hypertension with goal blood pressure less than 140/90 I10    8. Hyperlipidemia LDL goal <100 E78.5        RECOMMENDATIONS:     --Consult hospital rounder / IM to assist post-op medical management    NPO after midnight. No asa or nsaids 10 days prior. No alcohol of smoking 24 hours prior.     --Patient is to take all scheduled medications on the day of surgery EXCEPT for modifications listed below.    Anticoagulant or Antiplatelet Medication Use  ASPIRIN: Discontinue ASA 7-10 days prior to procedure to reduce bleeding risk.  It should be resumed post-operatively.        APPROVAL GIVEN to proceed with proposed procedure, without further diagnostic evaluation       Signed Electronically by: Jose Doe PA-C    Copy of this evaluation report is provided to requesting physician.    [facility] Preop Guidelines    Revised Cardiac Risk Index

## 2019-10-11 ENCOUNTER — TELEPHONE (OUTPATIENT)
Dept: FAMILY MEDICINE | Facility: CLINIC | Age: 74
End: 2019-10-11

## 2019-10-11 DIAGNOSIS — J43.1 PANLOBULAR EMPHYSEMA (H): Primary | ICD-10-CM

## 2019-10-11 DIAGNOSIS — I71.40 ABDOMINAL AORTIC ANEURYSM (AAA) WITHOUT RUPTURE (H): ICD-10-CM

## 2019-10-11 DIAGNOSIS — Z01.810 PRE-OPERATIVE CARDIOVASCULAR EXAMINATION: ICD-10-CM

## 2019-10-11 DIAGNOSIS — M51.369 DDD (DEGENERATIVE DISC DISEASE), LUMBAR: ICD-10-CM

## 2019-10-11 DIAGNOSIS — E11.9 TYPE 2 DIABETES MELLITUS WITHOUT COMPLICATION, WITHOUT LONG-TERM CURRENT USE OF INSULIN (H): ICD-10-CM

## 2019-10-11 DIAGNOSIS — I25.2 HISTORY OF MYOCARDIAL INFARCTION IN ADULTHOOD: ICD-10-CM

## 2019-10-11 LAB — GLUCOSE SERPL-MCNC: 126 MG/DL (ref 70–99)

## 2019-10-11 NOTE — TELEPHONE ENCOUNTER
Dr. Goddard:    I spoke with Arabella regarding below. The Anesthesiologist is requiring a cardiology clearance for the Pt to be given from Cardiology. It doesn't look like he has seen Cardiology before. Im assuming a referral will need to be placed and likely an RN will need to call and help with the scheduling end of things as his surgery is scheduled for 10/28.     Edie Pan, RN -- [facility]

## 2019-10-11 NOTE — PLAN OF CARE
Dr. Lujan, BRUNA reviewed H & P and wants pt to have cardiac clearance prior to surgery.  Jose Doe's office notified and will set this up.

## 2019-10-11 NOTE — TELEPHONE ENCOUNTER
Rebecca with [facility], [phone],  preadmitting area, anesthesiologist reviewed pt file, surgery scheduled 10/28/19,wants cardia clearance,  routed high priority to BW and PAL, please advise, inform Rebecca when appointment is scheduled  Yun Washington RN, BSN  Message handled by Nurse Triage.

## 2019-10-11 NOTE — TELEPHONE ENCOUNTER
Pt has a couple appointment with Cardiology.    Pt has been informed.    US Abdomial Aortic Imaging 10/14 @ 9:30am     [facility] [phone]   [address]  [address]  [place], [place]       Cardiology appointment 10/16 @ 11:15am with  Dr. Maci Acosta.    [facility] [phone] [address]  [address]  [place], [place]     Alison-Referrals

## 2019-10-11 NOTE — TELEPHONE ENCOUNTER
Rebecca informed at [facility], will f/u few days after appointment, also postphoned note, BW will need to addend preop after  Yun Washington, RN, BSN  Message handled by Nurse Triage.

## 2019-10-14 ENCOUNTER — HOSPITAL ENCOUNTER (OUTPATIENT)
Dept: ULTRASOUND IMAGING | Facility: CLINIC | Age: 74
Discharge: HOME | End: 2019-10-14
Attending: FAMILY MEDICINE | Admitting: FAMILY MEDICINE
Payer: COMMERCIAL

## 2019-10-14 DIAGNOSIS — I71.40 ABDOMINAL AORTIC ANEURYSM (AAA) WITHOUT RUPTURE (H): ICD-10-CM

## 2019-10-14 PROBLEM — I25.10 CORONARY ARTERY DISEASE INVOLVING NATIVE CORONARY ARTERY OF NATIVE HEART WITHOUT ANGINA PECTORIS: Status: ACTIVE | Noted: 2019-10-14

## 2019-10-14 PROCEDURE — 76775 US EXAM ABDO BACK WALL LIM: CPT | Mod: 26 | Performed by: INTERNAL MEDICINE

## 2019-10-14 PROCEDURE — 76775 US EXAM ABDO BACK WALL LIM: CPT

## 2019-10-16 ENCOUNTER — OFFICE VISIT (OUTPATIENT)
Dept: CARDIOLOGY | Facility: CLINIC | Age: 74
End: 2019-10-16
Attending: FAMILY MEDICINE
Payer: COMMERCIAL

## 2019-10-16 VITALS
HEART RATE: 51 BPM | WEIGHT: 173 LBS | DIASTOLIC BLOOD PRESSURE: 60 MMHG | HEIGHT: 63 IN | BODY MASS INDEX: 30.65 KG/M2 | SYSTOLIC BLOOD PRESSURE: 137 MMHG

## 2019-10-16 DIAGNOSIS — I25.10 CORONARY ARTERY DISEASE INVOLVING NATIVE CORONARY ARTERY OF NATIVE HEART WITHOUT ANGINA PECTORIS: Primary | ICD-10-CM

## 2019-10-16 DIAGNOSIS — I10 ESSENTIAL HYPERTENSION WITH GOAL BLOOD PRESSURE LESS THAN 140/90: ICD-10-CM

## 2019-10-16 DIAGNOSIS — E78.5 HYPERLIPIDEMIA LDL GOAL <100: ICD-10-CM

## 2019-10-16 PROCEDURE — 99204 OFFICE O/P NEW MOD 45 MIN: CPT | Performed by: INTERNAL MEDICINE

## 2019-10-16 ASSESSMENT — MIFFLIN-ST. JEOR: SCORE: 1414.85

## 2019-10-16 NOTE — LETTER
10/16/2019      Rigo Goddard MD  [address]  [facility] [address]      RE: Rubio Gibbons       Dear Colleague,    I had the pleasure of seeing Rubio Gibbons in the [facility].    Service Date: 10/16/2019      HISTORY OF PRESENT ILLNESS:  Mr. Rubio Gibbons was seen in cardiology consultation today at the request of Dr. Rigo Goddard.  Mr. Gibbons is to have a lumbar fusion performed and as part of his preoperative evaluation, he was seen by Anesthesiology at [facility].  He has a history of coronary artery disease and they recommended that Mr. Gibbons have a cardiology consultation before his surgery.      Mr. Gibbons is 74 years old and has a prior history of a myocardial infarct.  That occurred in 1998 and he remembers experiencing very significant chest discomfort prior to that episode.  He was admitted to [facility] in [place], [place].  He notes that he underwent coronary angiography and relates that only balloon angioplasty was performed without stent placement.  He has done well since that time and denies any chest, neck, arm or back discomfort other than his ongoing lumbar back discomfort.  He denies any exertional intolerance other than that caused by his lumbar back discomfort and he denies new dyspnea on exertion.      In 2013, he underwent a Lexiscan stress nuclear study.  That demonstrated a small to moderate and partially reversible distal anterior, anteroseptal and apical defect consistent with a small to moderate transmural infarct with mild antonieta-infarction ischemia.  There was also a small to moderate inferior and inferoseptal defect apparently consistent with a subacute endocardial infarct.  The ejection fraction was described as being moderately reduced so, MRI was recommended.  The MRI indicated overall normal left ventricular systolic performance with mild apical/septal hypokinesis.  There was only a very small and focal area of  late gadolinium enhancement in the left ventricular apex.  That enhancement was transmural.  He was seen by Dr. Naren López in this clinic as part of that evaluation.  Given that he was asymptomatic, no further evaluation was recommended.  Given the results of the MRI scan, it was recommended that he continue his medical therapy, which included an ACE inhibitor, beta-blocking agent, statin therapy and aspirin.  Followup was arranged for 6 months, but Mr. Gibbons was lost to followup.  He has not seen Cardiology in the interim.      PHYSICAL EXAMINATION:   GENERAL:  This is a man in no apparent distress, though he did prefer to stand as opposed to sit later during the appointment.  He walked slowly and did bend slightly at the waist as he leaned against the wall to support his back.   VITAL SIGNS:  Blood pressure was 137/60 mmHg, heart rate 51 beats per minute and regular, and respiratory rate 14-18 per minute.   CHEST:  Clear to auscultation.   CARDIAC:  On cardiac auscultation, there was an S1 and S2 without extra sounds or a murmur.  The rhythm was regular.      ASSESSMENT RECOMMENDATIONS:  This is an individual with a history of coronary artery disease.  He continues on appropriate medical care.  He does continue to smoke 1/4 pack daily and has smoked for approximately 40 years.  As such, I have recommended a repeat stress study.  He felt short of breath with Lexiscan previously used for stress.  We discussed the possibility of using dobutamine as he cannot exercise adequately to obtain an appropriate stress results.  After discussion of the risks, benefits and alternatives, he did decide to proceed with Lexiscan stress.  That will be combined with nuclear imaging.      If there is no evidence of additional ischemia as compared to his prior stress study, I would recommend he proceed with the planned back surgery.  It has been tentatively scheduled for 10/28/2019.  I would also recommend that he again consider  tobacco cessation.  I would additionally recommend followup in this clinic and that has also been arranged.         JACKELIN ACOSTA MD, [facility]             D: 10/17/2019   T: 10/17/2019   MT:       Name:     ADIEL TYSON   MRN:      [id number]        Account:      [id number]   :      1945           Service Date: 10/16/2019      Document: [id number]         Outpatient Encounter Medications as of 10/16/2019   Medication Sig Dispense Refill     ASPIRIN LOW DOSE 81 MG EC tablet TAKE 1 TABLET BY MOUTH DAILY 100 tablet 1     atorvastatin (LIPITOR) 40 MG tablet Take 1 tablet (40 mg) by mouth daily 90 tablet 0     buPROPion (WELLBUTRIN XL) 300 MG 24 hr tablet Take 1 tablet (300 mg) by mouth every morning 90 tablet 0     levothyroxine (SYNTHROID/LEVOTHROID) 88 MCG tablet Take 1 tablet (88 mcg) by mouth daily 90 tablet 0     lisinopril (PRINIVIL/ZESTRIL) 5 MG tablet TAKE ONE TABLET BY MOUTH ONCE DAILY 90 tablet 0     metoprolol succinate ER (TOPROL-XL) 50 MG 24 hr tablet Take 1 tablet (50 mg) by mouth daily 90 tablet 00     No facility-administered encounter medications on file as of 10/16/2019.        Again, thank you for allowing me to participate in the care of your patient.      Sincerely,    Jackelin Acosta MD     [facility]

## 2019-10-16 NOTE — LETTER
10/16/2019    Rigo Goddard MD  [id number]  [facility] [address]    RE: Rubio Gibbons       Dear Colleague,    I had the pleasure of seeing Rubio ALONA Gibbons in the [facility].    HPI and Plan:   See dictation [id number]    Orders Placed This Encounter   Procedures     NM Lexiscan stress test (nuc card)       No orders of the defined types were placed in this encounter.      There are no discontinued medications.      Encounter Diagnoses   Name Primary?     Coronary artery disease involving native coronary artery of native heart without angina pectoris Yes     Essential hypertension with goal blood pressure less than 140/90      Hyperlipidemia LDL goal <100        CURRENT MEDICATIONS:  Current Outpatient Medications   Medication Sig Dispense Refill     ASPIRIN LOW DOSE 81 MG EC tablet TAKE 1 TABLET BY MOUTH DAILY 100 tablet 1     atorvastatin (LIPITOR) 40 MG tablet Take 1 tablet (40 mg) by mouth daily 90 tablet 0     buPROPion (WELLBUTRIN XL) 300 MG 24 hr tablet Take 1 tablet (300 mg) by mouth every morning 90 tablet 0     levothyroxine (SYNTHROID/LEVOTHROID) 88 MCG tablet Take 1 tablet (88 mcg) by mouth daily 90 tablet 0     lisinopril (PRINIVIL/ZESTRIL) 5 MG tablet TAKE ONE TABLET BY MOUTH ONCE DAILY 90 tablet 0     metoprolol succinate ER (TOPROL-XL) 50 MG 24 hr tablet Take 1 tablet (50 mg) by mouth daily 90 tablet 00       ALLERGIES     Allergies   Allergen Reactions     Chantix [Varenicline] GI Disturbance       PAST MEDICAL HISTORY:  Past Medical History:   Diagnosis Date     Abdominal aortic aneurysm (AAA) without rupture (H) 10/13/2016     CAD (coronary artery disease)      Coronary artery disease involving native coronary artery of native heart without angina pectoris 10/14/2019    Per Note. Dr. López OV 2-20-13 - to establish care. He has a history of myocardial infarction back in 1998 @ [facility] . Does not remember any details- and no stent.   Nuclear 3-12-13 LVEF 34%  to establish care. He has a history of myocardial infarction back in 1998, small to moderately sized partially reversible distal anterior anteroseptal and apical defect, small to moderately sized inferio     Diabetes mellitus type 2, uncomplicated (H) 9/22/2016    Diet      WIN (dyspnea on exertion) 11/21/2014     Heart attack (H) 1998     History of myocardial infarction in adulthood 9/22/2016     HTN, goal below 140/90 1/28/2014     Hyperlipidemia LDL goal <100 10/31/2010     Hypothyroidism 11/9/2011     Hypothyroidism due to acquired atrophy of thyroid 2/18/2016     Ischemic cardiomyopathy      LBP (low back pain) 11/21/2014     Lumbar radiculopathy 9/10/2019     Microalbuminuria 11/7/2016    X1     Muscle spasm 9/25/2017     Other abnormal glucose 5/8/2014     Panlobular emphysema (H) 9/22/2016     Presbycusis syndrome, bilateral 9/22/2016     Right-sided low back pain without sciatica, unspecified chronicity 3/16/2017     Tobacco use disorder 7/19/2006     Tubular adenoma        PAST SURGICAL HISTORY:  Past Surgical History:   Procedure Laterality Date     ANGIOPLASTY  1998     COLONOSCOPY N/A 11/17/2016    Procedure: COLONOSCOPY;  Surgeon: Adam Lantigua MD;  Location:  GI       FAMILY HISTORY:  Family History   Problem Relation Age of Onset     Cardiovascular Father         heart attack     Coronary Artery Disease Father      Cerebrovascular Disease Father      Cardiovascular Brother         heart attack     Cardiovascular Sister         2 sisters - minor cardiac problems- pt unsure of the exact nature       SOCIAL HISTORY:  Social History     Socioeconomic History     Marital status:      Spouse name: None     Number of children: None     Years of education: None     Highest education level: 12th grade   Occupational History     None   Social Needs     Financial resource strain: Not hard at all     Food insecurity:     Worry: Never true     Inability: Never true     Transportation needs:      "Medical: No     Non-medical: No   Tobacco Use     Smoking status: Current Some Day Smoker     Packs/day: 0.25     Years: 40.00     Pack years: 10.00     Types: Cigarettes     Smokeless tobacco: Never Used     Tobacco comment: last one was 10/13 (trying to quit)   Substance and Sexual Activity     Alcohol use: No     Frequency: Never     Drinks per session: Patient refused     Binge frequency: Never     Drug use: No     Sexual activity: Yes     Partners: Female   Lifestyle     Physical activity:     Days per week: 2 days     Minutes per session: 20 min     Stress: Only a little   Relationships     Social connections:     Talks on phone: Three times a week     Gets together: Twice a week     Attends Anabaptist service: Never     Active member of club or organization: No     Attends meetings of clubs or organizations: Never     Relationship status:      Intimate partner violence:     Fear of current or ex partner: None     Emotionally abused: None     Physically abused: None     Forced sexual activity: None   Other Topics Concern     Parent/sibling w/ CABG, MI or angioplasty before 65F 55M? Yes   Social History Narrative     None       Review of Systems:  Skin:  Negative       Eyes:  Positive for glasses;contacts    ENT:  Negative      Respiratory:  Negative       Cardiovascular:  Negative      Gastroenterology: Negative      Genitourinary:  Negative      Musculoskeletal:  Negative      Neurologic:  Negative      Psychiatric:  Negative      Heme/Lymph/Imm:  Negative      Endocrine:  Positive for thyroid disorder      Physical Exam:  Vitals: /60   Pulse 51   Ht 1.6 m (5' 3\")   Wt 78.5 kg (173 lb)   BMI 30.65 kg/m       Constitutional:  cooperative, alert and oriented, well developed, well nourished, in no acute distress        Skin:  warm and dry to the touch          Head:  normocephalic        Eyes:  sclera white        Lymph:      ENT:           Neck:  carotid pulses are full and equal bilaterally, " JVP normal, no carotid bruit        Respiratory:  normal breath sounds, clear to auscultation, normal A-P diameter, normal symmetry, normal respiratory excursion, no use of accessory muscles         Cardiac: regular rhythm, normal S1/S2, no S3 or S4, apical impulse not displaced, no murmurs, gallops or rubs                                                         GI:           Extremities and Muscular Skeletal:                 Neurological:  no gross motor deficits;affect appropriate        Psych:  Alert and Oriented x 3;affect appropriate, oriented to time, person and place          CC  Rigo Goddard MD  [address] [place] [address]                    Thank you for allowing me to participate in the care of your patient.      Sincerely,     Maci Acosta MD     [facility]    cc:   Rigo Goddard MD  [address] [place] [address]

## 2019-10-16 NOTE — PROGRESS NOTES
HPI and Plan:   See dictation [id number]    Orders Placed This Encounter   Procedures     NM Lexiscan stress test (nuc card)       No orders of the defined types were placed in this encounter.      There are no discontinued medications.      Encounter Diagnoses   Name Primary?     Coronary artery disease involving native coronary artery of native heart without angina pectoris Yes     Essential hypertension with goal blood pressure less than 140/90      Hyperlipidemia LDL goal <100        CURRENT MEDICATIONS:  Current Outpatient Medications   Medication Sig Dispense Refill     ASPIRIN LOW DOSE 81 MG EC tablet TAKE 1 TABLET BY MOUTH DAILY 100 tablet 1     atorvastatin (LIPITOR) 40 MG tablet Take 1 tablet (40 mg) by mouth daily 90 tablet 0     buPROPion (WELLBUTRIN XL) 300 MG 24 hr tablet Take 1 tablet (300 mg) by mouth every morning 90 tablet 0     levothyroxine (SYNTHROID/LEVOTHROID) 88 MCG tablet Take 1 tablet (88 mcg) by mouth daily 90 tablet 0     lisinopril (PRINIVIL/ZESTRIL) 5 MG tablet TAKE ONE TABLET BY MOUTH ONCE DAILY 90 tablet 0     metoprolol succinate ER (TOPROL-XL) 50 MG 24 hr tablet Take 1 tablet (50 mg) by mouth daily 90 tablet 00       ALLERGIES     Allergies   Allergen Reactions     Chantix [Varenicline] GI Disturbance       PAST MEDICAL HISTORY:  Past Medical History:   Diagnosis Date     Abdominal aortic aneurysm (AAA) without rupture (H) 10/13/2016     CAD (coronary artery disease)      Coronary artery disease involving native coronary artery of native heart without angina pectoris 10/14/2019    Per Note. Dr. López OV 2-20-13 - to establish care. He has a history of myocardial infarction back in 1998 @ [facility] . Does not remember any details- and no stent.   Nuclear 3-12-13 LVEF 34% to establish care. He has a history of myocardial infarction back in 1998, small to moderately sized partially reversible distal anterior anteroseptal and apical defect, small to moderately sized inferio     Diabetes  mellitus type 2, uncomplicated (H) 9/22/2016    Diet      WIN (dyspnea on exertion) 11/21/2014     Heart attack (H) 1998     History of myocardial infarction in adulthood 9/22/2016     HTN, goal below 140/90 1/28/2014     Hyperlipidemia LDL goal <100 10/31/2010     Hypothyroidism 11/9/2011     Hypothyroidism due to acquired atrophy of thyroid 2/18/2016     Ischemic cardiomyopathy      LBP (low back pain) 11/21/2014     Lumbar radiculopathy 9/10/2019     Microalbuminuria 11/7/2016    X1     Muscle spasm 9/25/2017     Other abnormal glucose 5/8/2014     Panlobular emphysema (H) 9/22/2016     Presbycusis syndrome, bilateral 9/22/2016     Right-sided low back pain without sciatica, unspecified chronicity 3/16/2017     Tobacco use disorder 7/19/2006     Tubular adenoma        PAST SURGICAL HISTORY:  Past Surgical History:   Procedure Laterality Date     ANGIOPLASTY  1998     COLONOSCOPY N/A 11/17/2016    Procedure: COLONOSCOPY;  Surgeon: Adam Lantigua MD;  Location:  GI       FAMILY HISTORY:  Family History   Problem Relation Age of Onset     Cardiovascular Father         heart attack     Coronary Artery Disease Father      Cerebrovascular Disease Father      Cardiovascular Brother         heart attack     Cardiovascular Sister         2 sisters - minor cardiac problems- pt unsure of the exact nature       SOCIAL HISTORY:  Social History     Socioeconomic History     Marital status:      Spouse name: None     Number of children: None     Years of education: None     Highest education level: 12th grade   Occupational History     None   Social Needs     Financial resource strain: Not hard at all     Food insecurity:     Worry: Never true     Inability: Never true     Transportation needs:     Medical: No     Non-medical: No   Tobacco Use     Smoking status: Current Some Day Smoker     Packs/day: 0.25     Years: 40.00     Pack years: 10.00     Types: Cigarettes     Smokeless tobacco: Never Used     Tobacco  "comment: last one was 10/13 (trying to quit)   Substance and Sexual Activity     Alcohol use: No     Frequency: Never     Drinks per session: Patient refused     Binge frequency: Never     Drug use: No     Sexual activity: Yes     Partners: Female   Lifestyle     Physical activity:     Days per week: 2 days     Minutes per session: 20 min     Stress: Only a little   Relationships     Social connections:     Talks on phone: Three times a week     Gets together: Twice a week     Attends Anabaptism service: Never     Active member of club or organization: No     Attends meetings of clubs or organizations: Never     Relationship status:      Intimate partner violence:     Fear of current or ex partner: None     Emotionally abused: None     Physically abused: None     Forced sexual activity: None   Other Topics Concern     Parent/sibling w/ CABG, MI or angioplasty before 65F 55M? Yes   Social History Narrative     None       Review of Systems:  Skin:  Negative       Eyes:  Positive for glasses;contacts    ENT:  Negative      Respiratory:  Negative       Cardiovascular:  Negative      Gastroenterology: Negative      Genitourinary:  Negative      Musculoskeletal:  Negative      Neurologic:  Negative      Psychiatric:  Negative      Heme/Lymph/Imm:  Negative      Endocrine:  Positive for thyroid disorder      Physical Exam:  Vitals: /60   Pulse 51   Ht 1.6 m (5' 3\")   Wt 78.5 kg (173 lb)   BMI 30.65 kg/m      Constitutional:  cooperative, alert and oriented, well developed, well nourished, in no acute distress        Skin:  warm and dry to the touch          Head:  normocephalic        Eyes:  sclera white        Lymph:      ENT:           Neck:  carotid pulses are full and equal bilaterally, JVP normal, no carotid bruit        Respiratory:  normal breath sounds, clear to auscultation, normal A-P diameter, normal symmetry, normal respiratory excursion, no use of accessory muscles         Cardiac: regular " rhythm, normal S1/S2, no S3 or S4, apical impulse not displaced, no murmurs, gallops or rubs                                                         GI:           Extremities and Muscular Skeletal:                 Neurological:  no gross motor deficits;affect appropriate        Psych:  Alert and Oriented x 3;affect appropriate, oriented to time, person and place          CC  Rigo Goddard MD  [address] [place] [address]

## 2019-10-17 NOTE — PROGRESS NOTES
Service Date: 10/16/2019      HISTORY OF PRESENT ILLNESS:  Mr. Rubio Gibbons was seen in cardiology consultation today at the request of Dr. Rigo Goddard.  Mr. Gibbons is to have a lumbar fusion performed and as part of his preoperative evaluation, he was seen by Anesthesiology at [facility].  He has a history of coronary artery disease and they recommended that Mr. Gibbons have a cardiology consultation before his surgery.      Mr. Gibbons is 74 years old and has a prior history of a myocardial infarct.  That occurred in 1998 and he remembers experiencing very significant chest discomfort prior to that episode.  He was admitted to [facility] in [place], [place].  He notes that he underwent coronary angiography and relates that only balloon angioplasty was performed without stent placement.  He has done well since that time and denies any chest, neck, arm or back discomfort other than his ongoing lumbar back discomfort.  He denies any exertional intolerance other than that caused by his lumbar back discomfort and he denies new dyspnea on exertion.      In 2013, he underwent a Lexiscan stress nuclear study.  That demonstrated a small to moderate and partially reversible distal anterior, anteroseptal and apical defect consistent with a small to moderate transmural infarct with mild antonieta-infarction ischemia.  There was also a small to moderate inferior and inferoseptal defect apparently consistent with a subacute endocardial infarct.  The ejection fraction was described as being moderately reduced so, MRI was recommended.  The MRI indicated overall normal left ventricular systolic performance with mild apical/septal hypokinesis.  There was only a very small and focal area of late gadolinium enhancement in the left ventricular apex.  That enhancement was transmural.  He was seen by Dr. Naren López in this clinic as part of that evaluation.  Given that he was asymptomatic, no further evaluation  was recommended.  Given the results of the MRI scan, it was recommended that he continue his medical therapy, which included an ACE inhibitor, beta-blocking agent, statin therapy and aspirin.  Followup was arranged for 6 months, but Mr. Gibbons was lost to followup.  He has not seen Cardiology in the interim.      PHYSICAL EXAMINATION:   GENERAL:  This is a man in no apparent distress, though he did prefer to stand as opposed to sit later during the appointment.  He walked slowly and did bend slightly at the waist as he leaned against the wall to support his back.   VITAL SIGNS:  Blood pressure was 137/60 mmHg, heart rate 51 beats per minute and regular, and respiratory rate 14-18 per minute.   CHEST:  Clear to auscultation.   CARDIAC:  On cardiac auscultation, there was an S1 and S2 without extra sounds or a murmur.  The rhythm was regular.      ASSESSMENT RECOMMENDATIONS:  This is an individual with a history of coronary artery disease.  He continues on appropriate medical care.  He does continue to smoke 1/4 pack daily and has smoked for approximately 40 years.  As such, I have recommended a repeat stress study.  He felt short of breath with Lexiscan previously used for stress.  We discussed the possibility of using dobutamine as he cannot exercise adequately to obtain an appropriate stress results.  After discussion of the risks, benefits and alternatives, he did decide to proceed with Lexiscan stress.  That will be combined with nuclear imaging.      If there is no evidence of additional ischemia as compared to his prior stress study, I would recommend he proceed with the planned back surgery.  It has been tentatively scheduled for 10/28/2019.  I would also recommend that he again consider tobacco cessation.  I would additionally recommend followup in this clinic and that has also been arranged.         JACKELIN LIM MD, FACC             D: 10/17/2019   T: 10/17/2019   MT: FLORINDA      Name:     ADIEL GIBBONS    MRN:      [id number]        Account:      [id number]   :      1945           Service Date: 10/16/2019      Document: [id number]

## 2019-10-18 ENCOUNTER — HOSPITAL ENCOUNTER (OUTPATIENT)
Dept: CARDIOLOGY | Facility: CLINIC | Age: 74
Discharge: HOME | End: 2019-10-18
Attending: INTERNAL MEDICINE | Admitting: INTERNAL MEDICINE
Payer: COMMERCIAL

## 2019-10-18 ENCOUNTER — DOCUMENTATION ONLY (OUTPATIENT)
Dept: CARDIOLOGY | Facility: CLINIC | Age: 74
End: 2019-10-18

## 2019-10-18 ENCOUNTER — HOSPITAL ENCOUNTER (OUTPATIENT)
Dept: NUCLEAR MEDICINE | Facility: CLINIC | Age: 74
Setting detail: NUCLEAR MEDICINE
End: 2019-10-18
Attending: INTERNAL MEDICINE
Payer: COMMERCIAL

## 2019-10-18 DIAGNOSIS — I25.10 CORONARY ARTERY DISEASE INVOLVING NATIVE CORONARY ARTERY OF NATIVE HEART WITHOUT ANGINA PECTORIS: ICD-10-CM

## 2019-10-18 PROCEDURE — 78452 HT MUSCLE IMAGE SPECT MULT: CPT

## 2019-10-18 PROCEDURE — 25000128 H RX IP 250 OP 636

## 2019-10-18 PROCEDURE — 34300033 ZZH RX 343: Performed by: INTERNAL MEDICINE

## 2019-10-18 PROCEDURE — A9502 TC99M TETROFOSMIN: HCPCS | Performed by: INTERNAL MEDICINE

## 2019-10-18 PROCEDURE — 93017 CV STRESS TEST TRACING ONLY: CPT

## 2019-10-18 PROCEDURE — 78452 HT MUSCLE IMAGE SPECT MULT: CPT | Mod: 26 | Performed by: INTERNAL MEDICINE

## 2019-10-18 PROCEDURE — 93018 CV STRESS TEST I&R ONLY: CPT | Performed by: INTERNAL MEDICINE

## 2019-10-18 PROCEDURE — 93016 CV STRESS TEST SUPVJ ONLY: CPT | Performed by: INTERNAL MEDICINE

## 2019-10-18 RX ORDER — REGADENOSON 0.08 MG/ML
INJECTION, SOLUTION INTRAVENOUS
Status: COMPLETED
Start: 2019-10-18 | End: 2019-10-18

## 2019-10-18 RX ADMIN — TETROFOSMIN 11 MCI.: 1.38 INJECTION, POWDER, LYOPHILIZED, FOR SOLUTION INTRAVENOUS at 12:58

## 2019-10-18 RX ADMIN — TETROFOSMIN 31 MCI.: 1.38 INJECTION, POWDER, LYOPHILIZED, FOR SOLUTION INTRAVENOUS at 14:26

## 2019-10-18 RX ADMIN — REGADENOSON 0.4 MG: 0.08 INJECTION, SOLUTION INTRAVENOUS at 14:25

## 2019-10-18 NOTE — PROGRESS NOTES
Nuclear study 10/18/19 noted. Ordered for cardiac clearance - Surgery 10-28-19 at [facility]. By Dr. Beavers, L4-S1 decompression and trans foraminal lumber fusion.     Nuclear study:   1.  Myocardial perfusion imaging using single isotope technique demonstrated a fixed apical/distal anteroseptal defect consistent with a small nontransmural infarct. No ischemia appreciated in present  study.   2. Gated images demonstrated no regional wall motion abnormalities. The left ventricular systolic function is normal with LVEF of 54% with stress.    Will message Dr. Acosta to review for cardiac clearance addendum to notes.    1645 message from Dr. Acosta:  Good result on stress study. CD    Spoke with patient, he asks that we send the update to his PCP, Dr. Goddard  Faxed copy of today's note, office note from 10/16/19 and nuclear study 10/18/19 to Dr. Goddard's office.

## 2019-10-18 NOTE — PROGRESS NOTES
Pre-procedure:  Are you having any pain or shortness of breath (prior to starting)? none  Initial vital signs: /74, HR 47, RR 16  Allergies reviewed: yes   Rhythm: Sinus  Medications taken within 48 hours of procedure: none   Any nitrates within the last 48 hours:none  Last Caffeine: none  Lung sounds: CTA, no wheezing, crackles or rtx  audible upper airway sounds  Health History (COPD, Asthma, etc): copd           Procedure: Lexiscan  Reaction/symptoms after receiving Floridalma injection: Shortness of breath  Intensity of Pain: none ringing ears  Rhythm: sinus  1. Vital Signs:/78, HR 63, RR 16  2. Vital Signs:/71, HR 62, RR 16     Reversal agent: none    Post:   Resolution of symptoms?: YES  Vital signs: /80, HR 59, RR 16  Vital signs: /80, HR 56, RR 16  Rhythm: sinus  Walk: NO  Comment: none  Return to Radiology

## 2019-10-24 ENCOUNTER — OFFICE VISIT (OUTPATIENT)
Dept: FAMILY MEDICINE | Facility: CLINIC | Age: 74
End: 2019-10-24
Payer: COMMERCIAL

## 2019-10-24 VITALS
SYSTOLIC BLOOD PRESSURE: 126 MMHG | BODY MASS INDEX: 30.82 KG/M2 | DIASTOLIC BLOOD PRESSURE: 74 MMHG | WEIGHT: 174 LBS | OXYGEN SATURATION: 100 % | HEART RATE: 64 BPM | TEMPERATURE: 98.1 F

## 2019-10-24 DIAGNOSIS — F17.200 TOBACCO USE DISORDER: ICD-10-CM

## 2019-10-24 DIAGNOSIS — M54.16 LUMBAR RADICULOPATHY: Primary | ICD-10-CM

## 2019-10-24 PROCEDURE — 99213 OFFICE O/P EST LOW 20 MIN: CPT | Performed by: FAMILY MEDICINE

## 2019-10-24 NOTE — PROGRESS NOTES
Subjective     Rubio Gibbons is a 74 year old male who presents to clinic today for the following health issues:    HPI   Follow up for smoking cessation - not smoking for 11 days.          Lumbar radiculopathy  (primary encounter diagnosis) upcoming scheduled surgery.  He has questions regarding the surgical approach, whether anterior or posterior  Tobacco use disorder Patient reports not smoking for 11 days. He plans to continue cessation. Taking Rx Wellbutrin 300mg.            Past Medical History:   Diagnosis Date     Abdominal aortic aneurysm (AAA) without rupture (H) 10/13/2016     CAD (coronary artery disease)      Coronary artery disease involving native coronary artery of native heart without angina pectoris 10/14/2019    Per Note. Dr. López OV 2-20-13 - to establish care. He has a history of myocardial infarction back in 1998 @ [facility] . Does not remember any details- and no stent.   Nuclear 3-12-13 LVEF 34% to establish care. He has a history of myocardial infarction back in 1998, small to moderately sized partially reversible distal anterior anteroseptal and apical defect, small to moderately sized inferio     Diabetes mellitus type 2, uncomplicated (H) 9/22/2016    Diet      WIN (dyspnea on exertion) 11/21/2014     Heart attack (H) 1998     History of myocardial infarction in adulthood 9/22/2016     HTN, goal below 140/90 1/28/2014     Hyperlipidemia LDL goal <100 10/31/2010     Hypothyroidism 11/9/2011     Hypothyroidism due to acquired atrophy of thyroid 2/18/2016     Ischemic cardiomyopathy      LBP (low back pain) 11/21/2014     Lumbar radiculopathy 9/10/2019     Microalbuminuria 11/7/2016    X1     Muscle spasm 9/25/2017     Other abnormal glucose 5/8/2014     Panlobular emphysema (H) 9/22/2016     Presbycusis syndrome, bilateral 9/22/2016     Right-sided low back pain without sciatica, unspecified chronicity 3/16/2017     Tobacco use disorder 7/19/2006     Tubular adenoma        Past  Surgical History:   Procedure Laterality Date     ANGIOPLASTY  1998     COLONOSCOPY N/A 11/17/2016    Procedure: COLONOSCOPY;  Surgeon: Adam Lantigua MD;  Location:  GI       Family History   Problem Relation Age of Onset     Cardiovascular Father         heart attack     Coronary Artery Disease Father      Cerebrovascular Disease Father      Cardiovascular Brother         heart attack     Cardiovascular Sister         2 sisters - minor cardiac problems- pt unsure of the exact nature       Social History     Tobacco Use     Smoking status: Former Smoker     Packs/day: 0.25     Years: 40.00     Pack years: 10.00     Types: Cigarettes     Smokeless tobacco: Never Used     Tobacco comment: last one was 10/13 (trying to quit)   Substance Use Topics     Alcohol use: No     Frequency: Never     Drinks per session: Patient refused     Binge frequency: Never       Reviewed and updated as needed this visit by Provider       Review of Systems   Intermittent productive cough.  Rhinorrhea.  No congestion.  No fevers.  His sense of well-being is improved   this document serves as a record of the services and decisions personally performed and made by Rigo Goddard MD. It was created on his behalf by Donovan Mendoza, a trained medical scribe. The creation of this document is based on the provider's statements to the medical scribe.  Donovan Mendoza October 24, 2019 9:25 AM          Objective    /74 (BP Location: Right arm, Patient Position: Sitting, Cuff Size: Adult Regular)   Pulse 64   Temp 98.1  F (36.7  C) (Oral)   Wt 78.9 kg (174 lb)   SpO2 100%   BMI 30.82 kg/m    Body mass index is 30.82 kg/m .     Physical Exam   No cyanosis  No edema  PSYCH: affect bright      Diagnostic Test Results:  Labs reviewed in Epic  No results found for this or any previous visit (from the past 24 hour(s)).      ASSESSMENT / PLAN:  (M54.16) Lumbar radiculopathy  (primary encounter diagnosis)  Comment: Discussed his surgery.  Answered  his questions  Plan: Further questions to his surgeon    (F17.200) Tobacco use disorder  Comment: Wellbutrin is tolerated  Plan: Positive strokes for his cessation          Rigo Goddard MD          No follow-ups on file.    The information in this document, created by the medical scribe for me, accurately reflects the services I personally performed and the decisions made by me. I have reviewed and approved this document for accuracy prior to leaving the patient care area.  October 24, 2019 9:26 AM    Rigo Goddard MD  [facility]

## 2019-10-25 ENCOUNTER — HOSPITAL ENCOUNTER (OUTPATIENT)
Dept: LAB | Facility: CLINIC | Age: 74
Discharge: HOME | DRG: 455 | End: 2019-10-25
Attending: NEUROLOGICAL SURGERY | Admitting: NEUROLOGICAL SURGERY
Payer: COMMERCIAL

## 2019-10-25 ENCOUNTER — ANESTHESIA EVENT (OUTPATIENT)
Dept: SURGERY | Facility: CLINIC | Age: 74
DRG: 455 | End: 2019-10-25
Payer: COMMERCIAL

## 2019-10-25 DIAGNOSIS — Z01.812 PRE-OPERATIVE LABORATORY EXAMINATION: ICD-10-CM

## 2019-10-25 LAB
ABO + RH BLD: NORMAL
ABO + RH BLD: NORMAL
BLD GP AB SCN SERPL QL: NORMAL
BLOOD BANK CMNT PATIENT-IMP: NORMAL
CREAT SERPL-MCNC: 1.15 MG/DL (ref 0.66–1.25)
GFR SERPL CREATININE-BSD FRML MDRD: 62 ML/MIN/{1.73_M2}
POTASSIUM SERPL-SCNC: 4.6 MMOL/L (ref 3.4–5.3)
SPECIMEN EXP DATE BLD: NORMAL

## 2019-10-25 PROCEDURE — 86901 BLOOD TYPING SEROLOGIC RH(D): CPT | Performed by: NEUROLOGICAL SURGERY

## 2019-10-25 PROCEDURE — 86850 RBC ANTIBODY SCREEN: CPT | Performed by: NEUROLOGICAL SURGERY

## 2019-10-25 PROCEDURE — 36415 COLL VENOUS BLD VENIPUNCTURE: CPT | Performed by: NEUROLOGICAL SURGERY

## 2019-10-25 PROCEDURE — 82565 ASSAY OF CREATININE: CPT | Performed by: NEUROLOGICAL SURGERY

## 2019-10-25 PROCEDURE — 86900 BLOOD TYPING SEROLOGIC ABO: CPT | Performed by: NEUROLOGICAL SURGERY

## 2019-10-25 PROCEDURE — 84132 ASSAY OF SERUM POTASSIUM: CPT | Performed by: NEUROLOGICAL SURGERY

## 2019-10-27 NOTE — PHARMACY-ADMISSION MEDICATION HISTORY
Med rec completed by pre-admitting RNYokasta Stephanie A, verified by pharmacy.    Changes made to PTA medication list:  Added: none  Deleted: none  Changed: none    For patients on insulin therapy: NO     Prior to Admission medications    Medication Sig Last Dose Taking? Auth Provider   ASPIRIN LOW DOSE 81 MG EC tablet TAKE 1 TABLET BY MOUTH DAILY  Yes Rigo Goddard MD   atorvastatin (LIPITOR) 40 MG tablet Take 1 tablet (40 mg) by mouth daily  Yes Rigo Goddard MD   buPROPion (WELLBUTRIN XL) 300 MG 24 hr tablet Take 1 tablet (300 mg) by mouth every morning  Yes Rigo Goddard MD   levothyroxine (SYNTHROID/LEVOTHROID) 88 MCG tablet Take 1 tablet (88 mcg) by mouth daily  Yes Rigo Goddard MD   lisinopril (PRINIVIL/ZESTRIL) 5 MG tablet TAKE ONE TABLET BY MOUTH ONCE DAILY  Yes Rigo Goddard MD   metoprolol succinate ER (TOPROL-XL) 50 MG 24 hr tablet Take 1 tablet (50 mg) by mouth daily  Yes Rigo Goddard MD

## 2019-10-28 ENCOUNTER — ANESTHESIA (OUTPATIENT)
Dept: SURGERY | Facility: CLINIC | Age: 74
DRG: 455 | End: 2019-10-28
Payer: COMMERCIAL

## 2019-10-28 ENCOUNTER — APPOINTMENT (OUTPATIENT)
Dept: GENERAL RADIOLOGY | Facility: CLINIC | Age: 74
DRG: 455 | End: 2019-10-28
Attending: NEUROLOGICAL SURGERY
Payer: COMMERCIAL

## 2019-10-28 ENCOUNTER — HOSPITAL ENCOUNTER (INPATIENT)
Facility: CLINIC | Age: 74
LOS: 2 days | Discharge: HOME | DRG: 455 | End: 2019-10-30
Attending: NEUROLOGICAL SURGERY | Admitting: NEUROLOGICAL SURGERY
Payer: COMMERCIAL

## 2019-10-28 DIAGNOSIS — I25.2 HISTORY OF MYOCARDIAL INFARCTION IN ADULTHOOD: ICD-10-CM

## 2019-10-28 DIAGNOSIS — Z98.1 S/P CERVICAL SPINAL FUSION: Primary | ICD-10-CM

## 2019-10-28 LAB
GLUCOSE BLDC GLUCOMTR-MCNC: 144 MG/DL (ref 70–99)
GLUCOSE BLDC GLUCOMTR-MCNC: 151 MG/DL (ref 70–99)
GLUCOSE BLDC GLUCOMTR-MCNC: 153 MG/DL (ref 70–99)
GLUCOSE BLDC GLUCOMTR-MCNC: 171 MG/DL (ref 70–99)

## 2019-10-28 PROCEDURE — 0ST40ZZ RESECTION OF LUMBOSACRAL DISC, OPEN APPROACH: ICD-10-PCS | Performed by: NEUROLOGICAL SURGERY

## 2019-10-28 PROCEDURE — 40000986 XR LUMBAR SPINE PORT 1 VW

## 2019-10-28 PROCEDURE — 25800030 ZZH RX IP 258 OP 636: Performed by: ANESTHESIOLOGY

## 2019-10-28 PROCEDURE — C1713 ANCHOR/SCREW BN/BN,TIS/BN: HCPCS | Performed by: NEUROLOGICAL SURGERY

## 2019-10-28 PROCEDURE — 40000985 XR LUMBAR SPINE PORT 1 VW: Mod: TC

## 2019-10-28 PROCEDURE — 40000306 ZZH STATISTIC PRE PROC ASSESS II: Performed by: NEUROLOGICAL SURGERY

## 2019-10-28 PROCEDURE — 25800025 ZZH RX 258: Performed by: NEUROLOGICAL SURGERY

## 2019-10-28 PROCEDURE — 12000000 ZZH R&B MED SURG/OB

## 2019-10-28 PROCEDURE — 37000009 ZZH ANESTHESIA TECHNICAL FEE, EACH ADDTL 15 MIN: Performed by: NEUROLOGICAL SURGERY

## 2019-10-28 PROCEDURE — 0SG00AJ FUSION OF LUMBAR VERTEBRAL JOINT WITH INTERBODY FUSION DEVICE, POSTERIOR APPROACH, ANTERIOR COLUMN, OPEN APPROACH: ICD-10-PCS | Performed by: NEUROLOGICAL SURGERY

## 2019-10-28 PROCEDURE — L8699 PROSTHETIC IMPLANT NOS: HCPCS | Performed by: NEUROLOGICAL SURGERY

## 2019-10-28 PROCEDURE — 99222 1ST HOSP IP/OBS MODERATE 55: CPT | Performed by: INTERNAL MEDICINE

## 2019-10-28 PROCEDURE — 71000012 ZZH RECOVERY PHASE 1 LEVEL 1 FIRST HR: Performed by: NEUROLOGICAL SURGERY

## 2019-10-28 PROCEDURE — 25000125 ZZHC RX 250: Performed by: NEUROLOGICAL SURGERY

## 2019-10-28 PROCEDURE — 25800030 ZZH RX IP 258 OP 636: Performed by: NURSE ANESTHETIST, CERTIFIED REGISTERED

## 2019-10-28 PROCEDURE — 0SG30AJ FUSION OF LUMBOSACRAL JOINT WITH INTERBODY FUSION DEVICE, POSTERIOR APPROACH, ANTERIOR COLUMN, OPEN APPROACH: ICD-10-PCS | Performed by: NEUROLOGICAL SURGERY

## 2019-10-28 PROCEDURE — 8E0WXBZ COMPUTER ASSISTED PROCEDURE OF TRUNK REGION: ICD-10-PCS | Performed by: NEUROLOGICAL SURGERY

## 2019-10-28 PROCEDURE — 25000128 H RX IP 250 OP 636: Performed by: NURSE ANESTHETIST, CERTIFIED REGISTERED

## 2019-10-28 PROCEDURE — 36000069 ZZH SURGERY LEVEL 5 EA 15 ADDTL MIN: Performed by: NEUROLOGICAL SURGERY

## 2019-10-28 PROCEDURE — 25000128 H RX IP 250 OP 636: Performed by: NEUROLOGICAL SURGERY

## 2019-10-28 PROCEDURE — 25000128 H RX IP 250 OP 636: Performed by: ANESTHESIOLOGY

## 2019-10-28 PROCEDURE — 36000071 ZZH SURGERY LEVEL 5 W FLUORO 1ST 30 MIN: Performed by: NEUROLOGICAL SURGERY

## 2019-10-28 PROCEDURE — 00000146 ZZHCL STATISTIC GLUCOSE BY METER IP

## 2019-10-28 PROCEDURE — 25000300 ZZH OR RX SURGIFLO HEMOSTATIC MATRIX 10ML 199102S OPNP: Performed by: NEUROLOGICAL SURGERY

## 2019-10-28 PROCEDURE — 40000277 XR SURGERY CARM FLUORO LESS THAN 5 MIN W STILLS: Mod: TC

## 2019-10-28 PROCEDURE — 27210794 ZZH OR GENERAL SUPPLY STERILE: Performed by: NEUROLOGICAL SURGERY

## 2019-10-28 PROCEDURE — 71000013 ZZH RECOVERY PHASE 1 LEVEL 1 EA ADDTL HR: Performed by: NEUROLOGICAL SURGERY

## 2019-10-28 PROCEDURE — 0SG3071 FUSION OF LUMBOSACRAL JOINT WITH AUTOLOGOUS TISSUE SUBSTITUTE, POSTERIOR APPROACH, POSTERIOR COLUMN, OPEN APPROACH: ICD-10-PCS | Performed by: NEUROLOGICAL SURGERY

## 2019-10-28 PROCEDURE — 25000132 ZZH RX MED GY IP 250 OP 250 PS 637: Performed by: NEUROLOGICAL SURGERY

## 2019-10-28 PROCEDURE — 37000008 ZZH ANESTHESIA TECHNICAL FEE, 1ST 30 MIN: Performed by: NEUROLOGICAL SURGERY

## 2019-10-28 PROCEDURE — 0SG0071 FUSION OF LUMBAR VERTEBRAL JOINT WITH AUTOLOGOUS TISSUE SUBSTITUTE, POSTERIOR APPROACH, POSTERIOR COLUMN, OPEN APPROACH: ICD-10-PCS | Performed by: NEUROLOGICAL SURGERY

## 2019-10-28 PROCEDURE — 25000131 ZZH RX MED GY IP 250 OP 636 PS 637: Performed by: INTERNAL MEDICINE

## 2019-10-28 PROCEDURE — 25000125 ZZHC RX 250: Performed by: NURSE ANESTHETIST, CERTIFIED REGISTERED

## 2019-10-28 PROCEDURE — 27210995 ZZH RX 272: Performed by: NEUROLOGICAL SURGERY

## 2019-10-28 PROCEDURE — 01NB0ZZ RELEASE LUMBAR NERVE, OPEN APPROACH: ICD-10-PCS | Performed by: NEUROLOGICAL SURGERY

## 2019-10-28 DEVICE — IMPLANTABLE DEVICE: Type: IMPLANTABLE DEVICE | Site: SPINE LUMBAR | Status: FUNCTIONAL

## 2019-10-28 DEVICE — GRAFT BONE INFUSE BMP MED 7510400: Type: IMPLANTABLE DEVICE | Site: SPINE LUMBAR | Status: FUNCTIONAL

## 2019-10-28 RX ORDER — DIMENHYDRINATE 50 MG/ML
25 INJECTION, SOLUTION INTRAMUSCULAR; INTRAVENOUS
Status: DISCONTINUED | OUTPATIENT
Start: 2019-10-28 | End: 2019-10-28 | Stop reason: HOSPADM

## 2019-10-28 RX ORDER — LABETALOL 20 MG/4 ML (5 MG/ML) INTRAVENOUS SYRINGE
10
Status: DISCONTINUED | OUTPATIENT
Start: 2019-10-28 | End: 2019-10-28 | Stop reason: HOSPADM

## 2019-10-28 RX ORDER — HYDRALAZINE HYDROCHLORIDE 20 MG/ML
2.5-5 INJECTION INTRAMUSCULAR; INTRAVENOUS EVERY 10 MIN PRN
Status: DISCONTINUED | OUTPATIENT
Start: 2019-10-28 | End: 2019-10-28 | Stop reason: HOSPADM

## 2019-10-28 RX ORDER — EPHEDRINE SULFATE 50 MG/ML
INJECTION, SOLUTION INTRAMUSCULAR; INTRAVENOUS; SUBCUTANEOUS PRN
Status: DISCONTINUED | OUTPATIENT
Start: 2019-10-28 | End: 2019-10-28

## 2019-10-28 RX ORDER — LEVOTHYROXINE SODIUM 88 UG/1
88 TABLET ORAL DAILY
Status: DISCONTINUED | OUTPATIENT
Start: 2019-10-29 | End: 2019-10-30 | Stop reason: HOSPADM

## 2019-10-28 RX ORDER — ACETAMINOPHEN 325 MG/1
975 TABLET ORAL EVERY 8 HOURS
Status: DISCONTINUED | OUTPATIENT
Start: 2019-10-28 | End: 2019-10-28

## 2019-10-28 RX ORDER — ONDANSETRON 2 MG/ML
4 INJECTION INTRAMUSCULAR; INTRAVENOUS EVERY 30 MIN PRN
Status: DISCONTINUED | OUTPATIENT
Start: 2019-10-28 | End: 2019-10-28 | Stop reason: HOSPADM

## 2019-10-28 RX ORDER — SODIUM CHLORIDE, SODIUM LACTATE, POTASSIUM CHLORIDE, CALCIUM CHLORIDE 600; 310; 30; 20 MG/100ML; MG/100ML; MG/100ML; MG/100ML
INJECTION, SOLUTION INTRAVENOUS CONTINUOUS
Status: DISCONTINUED | OUTPATIENT
Start: 2019-10-28 | End: 2019-10-28 | Stop reason: HOSPADM

## 2019-10-28 RX ORDER — DIPHENHYDRAMINE HCL 12.5MG/5ML
12.5 LIQUID (ML) ORAL EVERY 6 HOURS PRN
Status: DISCONTINUED | OUTPATIENT
Start: 2019-10-28 | End: 2019-10-30 | Stop reason: HOSPADM

## 2019-10-28 RX ORDER — ACETAMINOPHEN 325 MG/1
650 TABLET ORAL EVERY 4 HOURS PRN
Status: DISCONTINUED | OUTPATIENT
Start: 2019-10-31 | End: 2019-10-30 | Stop reason: HOSPADM

## 2019-10-28 RX ORDER — VANCOMYCIN HYDROCHLORIDE 1 G/20ML
INJECTION, POWDER, LYOPHILIZED, FOR SOLUTION INTRAVENOUS PRN
Status: DISCONTINUED | OUTPATIENT
Start: 2019-10-28 | End: 2019-10-28 | Stop reason: HOSPADM

## 2019-10-28 RX ORDER — FENTANYL CITRATE 50 UG/ML
25-50 INJECTION, SOLUTION INTRAMUSCULAR; INTRAVENOUS
Status: DISCONTINUED | OUTPATIENT
Start: 2019-10-28 | End: 2019-10-28 | Stop reason: HOSPADM

## 2019-10-28 RX ORDER — GLYCOPYRROLATE 0.2 MG/ML
INJECTION, SOLUTION INTRAMUSCULAR; INTRAVENOUS PRN
Status: DISCONTINUED | OUTPATIENT
Start: 2019-10-28 | End: 2019-10-28

## 2019-10-28 RX ORDER — ATORVASTATIN CALCIUM 40 MG/1
40 TABLET, FILM COATED ORAL DAILY
Status: DISCONTINUED | OUTPATIENT
Start: 2019-10-29 | End: 2019-10-30 | Stop reason: HOSPADM

## 2019-10-28 RX ORDER — NALOXONE HYDROCHLORIDE 0.4 MG/ML
.1-.4 INJECTION, SOLUTION INTRAMUSCULAR; INTRAVENOUS; SUBCUTANEOUS
Status: DISCONTINUED | OUTPATIENT
Start: 2019-10-28 | End: 2019-10-30 | Stop reason: HOSPADM

## 2019-10-28 RX ORDER — PROCHLORPERAZINE MALEATE 5 MG
5 TABLET ORAL EVERY 6 HOURS PRN
Status: DISCONTINUED | OUTPATIENT
Start: 2019-10-28 | End: 2019-10-30 | Stop reason: HOSPADM

## 2019-10-28 RX ORDER — SODIUM CHLORIDE AND POTASSIUM CHLORIDE 150; 900 MG/100ML; MG/100ML
INJECTION, SOLUTION INTRAVENOUS CONTINUOUS
Status: DISCONTINUED | OUTPATIENT
Start: 2019-10-28 | End: 2019-10-30 | Stop reason: HOSPADM

## 2019-10-28 RX ORDER — FENTANYL CITRATE 50 UG/ML
INJECTION, SOLUTION INTRAMUSCULAR; INTRAVENOUS PRN
Status: DISCONTINUED | OUTPATIENT
Start: 2019-10-28 | End: 2019-10-28

## 2019-10-28 RX ORDER — HYDROMORPHONE HYDROCHLORIDE 1 MG/ML
.3-.5 INJECTION, SOLUTION INTRAMUSCULAR; INTRAVENOUS; SUBCUTANEOUS EVERY 5 MIN PRN
Status: DISCONTINUED | OUTPATIENT
Start: 2019-10-28 | End: 2019-10-28 | Stop reason: HOSPADM

## 2019-10-28 RX ORDER — LIDOCAINE HYDROCHLORIDE 10 MG/ML
INJECTION, SOLUTION INFILTRATION; PERINEURAL PRN
Status: DISCONTINUED | OUTPATIENT
Start: 2019-10-28 | End: 2019-10-28

## 2019-10-28 RX ORDER — OXYCODONE HYDROCHLORIDE 5 MG/1
5-10 TABLET ORAL
Status: DISCONTINUED | OUTPATIENT
Start: 2019-10-28 | End: 2019-10-30 | Stop reason: HOSPADM

## 2019-10-28 RX ORDER — KETAMINE HYDROCHLORIDE 10 MG/ML
INJECTION INTRAMUSCULAR; INTRAVENOUS PRN
Status: DISCONTINUED | OUTPATIENT
Start: 2019-10-28 | End: 2019-10-28

## 2019-10-28 RX ORDER — METOCLOPRAMIDE 5 MG/1
5 TABLET ORAL EVERY 6 HOURS PRN
Status: DISCONTINUED | OUTPATIENT
Start: 2019-10-28 | End: 2019-10-30 | Stop reason: HOSPADM

## 2019-10-28 RX ORDER — ACETAMINOPHEN 325 MG/1
975 TABLET ORAL EVERY 8 HOURS
Status: DISCONTINUED | OUTPATIENT
Start: 2019-10-28 | End: 2019-10-30 | Stop reason: HOSPADM

## 2019-10-28 RX ORDER — BUPROPION HYDROCHLORIDE 150 MG/1
300 TABLET ORAL EVERY MORNING
Status: DISCONTINUED | OUTPATIENT
Start: 2019-10-29 | End: 2019-10-30 | Stop reason: HOSPADM

## 2019-10-28 RX ORDER — ACETAMINOPHEN 325 MG/1
650 TABLET ORAL EVERY 4 HOURS PRN
Status: DISCONTINUED | OUTPATIENT
Start: 2019-10-31 | End: 2019-10-28

## 2019-10-28 RX ORDER — HYDROMORPHONE HYDROCHLORIDE 1 MG/ML
.3-.5 INJECTION, SOLUTION INTRAMUSCULAR; INTRAVENOUS; SUBCUTANEOUS
Status: DISCONTINUED | OUTPATIENT
Start: 2019-10-28 | End: 2019-10-30 | Stop reason: HOSPADM

## 2019-10-28 RX ORDER — PROPOFOL 10 MG/ML
INJECTION, EMULSION INTRAVENOUS PRN
Status: DISCONTINUED | OUTPATIENT
Start: 2019-10-28 | End: 2019-10-28

## 2019-10-28 RX ORDER — LISINOPRIL 5 MG/1
5 TABLET ORAL DAILY
Status: DISCONTINUED | OUTPATIENT
Start: 2019-10-29 | End: 2019-10-30 | Stop reason: HOSPADM

## 2019-10-28 RX ORDER — NALOXONE HYDROCHLORIDE 0.4 MG/ML
.1-.4 INJECTION, SOLUTION INTRAMUSCULAR; INTRAVENOUS; SUBCUTANEOUS
Status: DISCONTINUED | OUTPATIENT
Start: 2019-10-28 | End: 2019-10-28

## 2019-10-28 RX ORDER — DEXTROSE MONOHYDRATE 25 G/50ML
25-50 INJECTION, SOLUTION INTRAVENOUS
Status: DISCONTINUED | OUTPATIENT
Start: 2019-10-28 | End: 2019-10-30 | Stop reason: HOSPADM

## 2019-10-28 RX ORDER — METOPROLOL SUCCINATE 50 MG/1
50 TABLET, EXTENDED RELEASE ORAL DAILY
Status: DISCONTINUED | OUTPATIENT
Start: 2019-10-29 | End: 2019-10-30 | Stop reason: HOSPADM

## 2019-10-28 RX ORDER — CEFAZOLIN SODIUM 2 G/100ML
2 INJECTION, SOLUTION INTRAVENOUS EVERY 8 HOURS
Status: DISCONTINUED | OUTPATIENT
Start: 2019-10-28 | End: 2019-10-30 | Stop reason: HOSPADM

## 2019-10-28 RX ORDER — DEXAMETHASONE SODIUM PHOSPHATE 4 MG/ML
INJECTION, SOLUTION INTRA-ARTICULAR; INTRALESIONAL; INTRAMUSCULAR; INTRAVENOUS; SOFT TISSUE PRN
Status: DISCONTINUED | OUTPATIENT
Start: 2019-10-28 | End: 2019-10-28

## 2019-10-28 RX ORDER — ONDANSETRON 2 MG/ML
4 INJECTION INTRAMUSCULAR; INTRAVENOUS EVERY 6 HOURS PRN
Status: DISCONTINUED | OUTPATIENT
Start: 2019-10-28 | End: 2019-10-30 | Stop reason: HOSPADM

## 2019-10-28 RX ORDER — METHOCARBAMOL 500 MG/1
500 TABLET, FILM COATED ORAL 4 TIMES DAILY PRN
Status: DISCONTINUED | OUTPATIENT
Start: 2019-10-28 | End: 2019-10-30 | Stop reason: HOSPADM

## 2019-10-28 RX ORDER — METOCLOPRAMIDE HYDROCHLORIDE 5 MG/ML
5 INJECTION INTRAMUSCULAR; INTRAVENOUS EVERY 6 HOURS PRN
Status: DISCONTINUED | OUTPATIENT
Start: 2019-10-28 | End: 2019-10-30 | Stop reason: HOSPADM

## 2019-10-28 RX ORDER — NEOSTIGMINE METHYLSULFATE 1 MG/ML
VIAL (ML) INJECTION PRN
Status: DISCONTINUED | OUTPATIENT
Start: 2019-10-28 | End: 2019-10-28

## 2019-10-28 RX ORDER — ONDANSETRON 2 MG/ML
INJECTION INTRAMUSCULAR; INTRAVENOUS PRN
Status: DISCONTINUED | OUTPATIENT
Start: 2019-10-28 | End: 2019-10-28

## 2019-10-28 RX ORDER — ONDANSETRON 4 MG/1
4 TABLET, ORALLY DISINTEGRATING ORAL EVERY 6 HOURS PRN
Status: DISCONTINUED | OUTPATIENT
Start: 2019-10-28 | End: 2019-10-30 | Stop reason: HOSPADM

## 2019-10-28 RX ORDER — DOCUSATE SODIUM 100 MG/1
100 CAPSULE, LIQUID FILLED ORAL 2 TIMES DAILY
Status: DISCONTINUED | OUTPATIENT
Start: 2019-10-28 | End: 2019-10-30 | Stop reason: HOSPADM

## 2019-10-28 RX ORDER — CEFAZOLIN SODIUM 1 G/3ML
1 INJECTION, POWDER, FOR SOLUTION INTRAMUSCULAR; INTRAVENOUS SEE ADMIN INSTRUCTIONS
Status: DISCONTINUED | OUTPATIENT
Start: 2019-10-28 | End: 2019-10-28 | Stop reason: HOSPADM

## 2019-10-28 RX ORDER — ONDANSETRON 4 MG/1
4 TABLET, ORALLY DISINTEGRATING ORAL EVERY 30 MIN PRN
Status: DISCONTINUED | OUTPATIENT
Start: 2019-10-28 | End: 2019-10-28 | Stop reason: HOSPADM

## 2019-10-28 RX ORDER — ATORVASTATIN CALCIUM 40 MG/1
40 TABLET, FILM COATED ORAL DAILY
Status: DISCONTINUED | OUTPATIENT
Start: 2019-10-28 | End: 2019-10-28

## 2019-10-28 RX ORDER — DIPHENHYDRAMINE HYDROCHLORIDE 50 MG/ML
12.5 INJECTION INTRAMUSCULAR; INTRAVENOUS EVERY 6 HOURS PRN
Status: DISCONTINUED | OUTPATIENT
Start: 2019-10-28 | End: 2019-10-30 | Stop reason: HOSPADM

## 2019-10-28 RX ORDER — CEFAZOLIN SODIUM 2 G/100ML
2 INJECTION, SOLUTION INTRAVENOUS
Status: COMPLETED | OUTPATIENT
Start: 2019-10-28 | End: 2019-10-28

## 2019-10-28 RX ORDER — HYDROMORPHONE HYDROCHLORIDE 2 MG/1
2-4 TABLET ORAL EVERY 4 HOURS PRN
Status: DISCONTINUED | OUTPATIENT
Start: 2019-10-28 | End: 2019-10-30 | Stop reason: HOSPADM

## 2019-10-28 RX ORDER — LIDOCAINE 40 MG/G
CREAM TOPICAL
Status: DISCONTINUED | OUTPATIENT
Start: 2019-10-28 | End: 2019-10-30 | Stop reason: HOSPADM

## 2019-10-28 RX ORDER — NICOTINE POLACRILEX 4 MG
15-30 LOZENGE BUCCAL
Status: DISCONTINUED | OUTPATIENT
Start: 2019-10-28 | End: 2019-10-30 | Stop reason: HOSPADM

## 2019-10-28 RX ORDER — LIDOCAINE 40 MG/G
CREAM TOPICAL
Status: DISCONTINUED | OUTPATIENT
Start: 2019-10-28 | End: 2019-10-28 | Stop reason: HOSPADM

## 2019-10-28 RX ADMIN — SODIUM CHLORIDE, POTASSIUM CHLORIDE, SODIUM LACTATE AND CALCIUM CHLORIDE: 600; 310; 30; 20 INJECTION, SOLUTION INTRAVENOUS at 12:55

## 2019-10-28 RX ADMIN — Medication: at 14:00

## 2019-10-28 RX ADMIN — HYDROMORPHONE HYDROCHLORIDE 0.5 MG: 1 INJECTION, SOLUTION INTRAMUSCULAR; INTRAVENOUS; SUBCUTANEOUS at 12:04

## 2019-10-28 RX ADMIN — Medication 10 MG: at 07:44

## 2019-10-28 RX ADMIN — CEFAZOLIN SODIUM 2 G: 2 INJECTION, SOLUTION INTRAVENOUS at 07:45

## 2019-10-28 RX ADMIN — FENTANYL CITRATE 50 MCG: 50 INJECTION, SOLUTION INTRAMUSCULAR; INTRAVENOUS at 09:57

## 2019-10-28 RX ADMIN — CEFAZOLIN 1 G: 1 INJECTION, POWDER, FOR SOLUTION INTRAMUSCULAR; INTRAVENOUS at 09:45

## 2019-10-28 RX ADMIN — INSULIN ASPART 1 UNITS: 100 INJECTION, SOLUTION INTRAVENOUS; SUBCUTANEOUS at 19:33

## 2019-10-28 RX ADMIN — PHENYLEPHRINE HYDROCHLORIDE 200 MCG: 10 INJECTION INTRAVENOUS at 07:44

## 2019-10-28 RX ADMIN — PHENYLEPHRINE HYDROCHLORIDE 100 MCG: 10 INJECTION INTRAVENOUS at 07:57

## 2019-10-28 RX ADMIN — ONDANSETRON HYDROCHLORIDE 4 MG: 2 INJECTION, SOLUTION INTRAVENOUS at 07:38

## 2019-10-28 RX ADMIN — ROCURONIUM BROMIDE 10 MG: 10 INJECTION INTRAVENOUS at 10:15

## 2019-10-28 RX ADMIN — ROCURONIUM BROMIDE 5 MG: 10 INJECTION INTRAVENOUS at 08:19

## 2019-10-28 RX ADMIN — SODIUM CHLORIDE, POTASSIUM CHLORIDE, SODIUM LACTATE AND CALCIUM CHLORIDE: 600; 310; 30; 20 INJECTION, SOLUTION INTRAVENOUS at 08:26

## 2019-10-28 RX ADMIN — ROCURONIUM BROMIDE 35 MG: 10 INJECTION INTRAVENOUS at 07:38

## 2019-10-28 RX ADMIN — HYDROMORPHONE HYDROCHLORIDE 0.3 MG: 1 INJECTION, SOLUTION INTRAMUSCULAR; INTRAVENOUS; SUBCUTANEOUS at 11:17

## 2019-10-28 RX ADMIN — ACETAMINOPHEN 975 MG: 325 TABLET, FILM COATED ORAL at 21:43

## 2019-10-28 RX ADMIN — Medication 10 MG: at 07:57

## 2019-10-28 RX ADMIN — DOCUSATE SODIUM 100 MG: 100 CAPSULE, LIQUID FILLED ORAL at 21:43

## 2019-10-28 RX ADMIN — PROPOFOL 200 MG: 10 INJECTION, EMULSION INTRAVENOUS at 07:38

## 2019-10-28 RX ADMIN — PHENYLEPHRINE HYDROCHLORIDE 100 MCG: 10 INJECTION INTRAVENOUS at 08:25

## 2019-10-28 RX ADMIN — Medication 5 MG: at 08:09

## 2019-10-28 RX ADMIN — MIDAZOLAM 1 MG: 1 INJECTION INTRAMUSCULAR; INTRAVENOUS at 07:36

## 2019-10-28 RX ADMIN — HYDROMORPHONE HYDROCHLORIDE 0.5 MG: 1 INJECTION, SOLUTION INTRAMUSCULAR; INTRAVENOUS; SUBCUTANEOUS at 11:50

## 2019-10-28 RX ADMIN — LIDOCAINE HYDROCHLORIDE 50 MG: 10 INJECTION, SOLUTION INFILTRATION; PERINEURAL at 07:38

## 2019-10-28 RX ADMIN — PHENYLEPHRINE HYDROCHLORIDE 100 MCG: 10 INJECTION INTRAVENOUS at 08:34

## 2019-10-28 RX ADMIN — PHENYLEPHRINE HYDROCHLORIDE 100 MCG: 10 INJECTION INTRAVENOUS at 09:04

## 2019-10-28 RX ADMIN — SODIUM CHLORIDE, POTASSIUM CHLORIDE, SODIUM LACTATE AND CALCIUM CHLORIDE: 600; 310; 30; 20 INJECTION, SOLUTION INTRAVENOUS at 10:42

## 2019-10-28 RX ADMIN — FENTANYL CITRATE 100 MCG: 50 INJECTION, SOLUTION INTRAMUSCULAR; INTRAVENOUS at 07:38

## 2019-10-28 RX ADMIN — Medication 3 MG: at 11:05

## 2019-10-28 RX ADMIN — GLYCOPYRROLATE 0.2 MG: 0.2 INJECTION, SOLUTION INTRAMUSCULAR; INTRAVENOUS at 07:38

## 2019-10-28 RX ADMIN — HYDROMORPHONE HYDROCHLORIDE 0.5 MG: 1 INJECTION, SOLUTION INTRAMUSCULAR; INTRAVENOUS; SUBCUTANEOUS at 10:57

## 2019-10-28 RX ADMIN — PHENYLEPHRINE HYDROCHLORIDE 100 MCG: 10 INJECTION INTRAVENOUS at 08:52

## 2019-10-28 RX ADMIN — ACETAMINOPHEN 975 MG: 325 TABLET, FILM COATED ORAL at 13:38

## 2019-10-28 RX ADMIN — DEXAMETHASONE SODIUM PHOSPHATE 8 MG: 4 INJECTION, SOLUTION INTRA-ARTICULAR; INTRALESIONAL; INTRAMUSCULAR; INTRAVENOUS; SOFT TISSUE at 07:38

## 2019-10-28 RX ADMIN — CEFAZOLIN SODIUM 2 G: 2 INJECTION, SOLUTION INTRAVENOUS at 17:13

## 2019-10-28 RX ADMIN — PHENYLEPHRINE HYDROCHLORIDE 0.26 MCG/KG/MIN: 10 INJECTION INTRAVENOUS at 09:15

## 2019-10-28 RX ADMIN — Medication 50 MG: at 07:43

## 2019-10-28 RX ADMIN — POTASSIUM CHLORIDE AND SODIUM CHLORIDE: 900; 150 INJECTION, SOLUTION INTRAVENOUS at 13:42

## 2019-10-28 RX ADMIN — GLYCOPYRROLATE 0.4 MG: 0.2 INJECTION, SOLUTION INTRAMUSCULAR; INTRAVENOUS at 11:05

## 2019-10-28 RX ADMIN — ROCURONIUM BROMIDE 10 MG: 10 INJECTION INTRAVENOUS at 09:32

## 2019-10-28 RX ADMIN — SODIUM CHLORIDE, POTASSIUM CHLORIDE, SODIUM LACTATE AND CALCIUM CHLORIDE: 600; 310; 30; 20 INJECTION, SOLUTION INTRAVENOUS at 07:36

## 2019-10-28 RX ADMIN — DEXMEDETOMIDINE HYDROCHLORIDE 0.5 MCG/KG/HR: 100 INJECTION, SOLUTION INTRAVENOUS at 07:50

## 2019-10-28 RX ADMIN — RANITIDINE 150 MG: 150 TABLET ORAL at 13:38

## 2019-10-28 RX ADMIN — ROCURONIUM BROMIDE 10 MG: 10 INJECTION INTRAVENOUS at 08:50

## 2019-10-28 ASSESSMENT — ACTIVITIES OF DAILY LIVING (ADL)
RETIRED_COMMUNICATION: 0-->UNDERSTANDS/COMMUNICATES WITHOUT DIFFICULTY
TRANSFERRING: 0-->INDEPENDENT
COGNITION: 0 - NO COGNITION ISSUES REPORTED
BATHING: 0-->INDEPENDENT
SWALLOWING: 0-->SWALLOWS FOODS/LIQUIDS WITHOUT DIFFICULTY
ADLS_ACUITY_SCORE: 12
AMBULATION: 0-->INDEPENDENT
DRESS: 0-->INDEPENDENT
ADLS_ACUITY_SCORE: 12
TOILETING: 0-->INDEPENDENT
RETIRED_EATING: 0-->INDEPENDENT
FALL_HISTORY_WITHIN_LAST_SIX_MONTHS: NO

## 2019-10-28 ASSESSMENT — ENCOUNTER SYMPTOMS
STRIDOR: 0
SEIZURES: 0
DYSRHYTHMIAS: 0

## 2019-10-28 ASSESSMENT — LIFESTYLE VARIABLES: TOBACCO_USE: 1

## 2019-10-28 ASSESSMENT — COPD QUESTIONNAIRES
COPD: 1
CAT_SEVERITY: MILD

## 2019-10-28 ASSESSMENT — MIFFLIN-ST. JEOR: SCORE: 1419.39

## 2019-10-28 NOTE — CONSULTS
Hospitalist Consultation      Rubio Gibbons MRN# [id number]   YOB: 1945 Age: 74 year old   Date of Admission: 10/28/2019     Requesting Physician: Dr. Beavers  Reason for consult: Medical Management           Assessment and Plan:   Mr. Gibbons is a 74-year-old male admitted for elective L4-S1 decompression and transforaminal lumbar body fusion.  Medicine team was consulted for comanagement     His past medical history significant for coronary artery disease, abdominal aortic aneurysm, hypertension, hyperlipidemia, hypothyroidism, ischemic cardiomyopathy, hospital diagnosis of emphysema and history of tobacco use.    Status post L4 S1 decompression and fusion  --Postop care, pain management, anticoagulation and physical therapy per primary service  --Estimated blood loss 100ml  --Continue capnography 24 hours    Diabetes mellitus  --Given A1c in February 2019 was 6.6  --Not on any medications prior to admission  --We will continue sliding scale insulin    History of coronary artery disease  --Continue aspirin and statin  --Resume beta-blocker and ACE inhibitor with parameters for blood pressure  --Stable     Hypertension  --Prior to admission taking lisinopril 5 mg and metoprolol 50 mg daily  --We will resume antihypertensives with parameters  --Patient was mildly hypotensive in PACU    Hypothyroidism  --Resume Synthroid    Hyperlipidemia  --Resume statins      Thank you for the consult will continue to follow along             History of Present Illness:   This patient is a 74 year old male with past medical history significant for coronary artery disease, ischemic cardiomyopathy, hypertension, hyperlipidemia, hypothyroidism history of tobacco use, borderline emphysema admitted for elective L4 S1 decompression and lumbar body fusion.  Prior to surgery patient had significant radiculopathy limiting his ADLs.  Pain at times is 10 out of 10 worse with range of motion and extension of the legs.  Given  worsening symptoms patient opted for elective spinal fusion and decompression surgery.  Tolerated procedure well.  Pain is weight well controlled at this time.    Review of all the other symptoms are negative.          Past Medical History:     Past Medical History:   Diagnosis Date     Abdominal aortic aneurysm (AAA) without rupture (H) 10/13/2016     CAD (coronary artery disease)      Coronary artery disease involving native coronary artery of native heart without angina pectoris 10/14/2019    Per Note. Dr. López OV 2-20-13 - to establish care. He has a history of myocardial infarction back in 1998 @ [facility] . Does not remember any details- and no stent.   Nuclear 3-12-13 LVEF 34% to establish care. He has a history of myocardial infarction back in 1998, small to moderately sized partially reversible distal anterior anteroseptal and apical defect, small to moderately sized inferio     Diabetes mellitus type 2, uncomplicated (H) 9/22/2016    Diet      WIN (dyspnea on exertion) 11/21/2014     Heart attack (H) 1998     History of myocardial infarction in adulthood 9/22/2016     HTN, goal below 140/90 1/28/2014     Hyperlipidemia LDL goal <100 10/31/2010     Hypothyroidism 11/9/2011     Hypothyroidism due to acquired atrophy of thyroid 2/18/2016     Ischemic cardiomyopathy      LBP (low back pain) 11/21/2014     Lumbar radiculopathy 9/10/2019     Microalbuminuria 11/7/2016    X1     Muscle spasm 9/25/2017     Other abnormal glucose 5/8/2014     Panlobular emphysema (H) 9/22/2016     Presbycusis syndrome, bilateral 9/22/2016     Right-sided low back pain without sciatica, unspecified chronicity 3/16/2017     Tobacco use disorder 7/19/2006     Tubular adenoma              Past Surgical History:     Past Surgical History:   Procedure Laterality Date     ANGIOPLASTY  1998     COLONOSCOPY N/A 11/17/2016    Procedure: COLONOSCOPY;  Surgeon: Adam Lantigua MD;  Location:  GI               Social History:      Social History     Tobacco Use     Smoking status: Former Smoker     Packs/day: 0.25     Years: 40.00     Pack years: 10.00     Types: Cigarettes     Smokeless tobacco: Never Used     Tobacco comment: last one was 10/13 (trying to quit)   Substance Use Topics     Alcohol use: No     Frequency: Never     Drinks per session: Patient refused     Binge frequency: Never             Family History:     Family History   Problem Relation Age of Onset     Cardiovascular Father         heart attack     Coronary Artery Disease Father      Cerebrovascular Disease Father      Cardiovascular Brother         heart attack     Cardiovascular Sister         2 sisters - minor cardiac problems- pt unsure of the exact nature             Allergies:     Allergies   Allergen Reactions     Chantix [Varenicline] GI Disturbance             Medications:     Medications Prior to Admission   Medication Sig Dispense Refill Last Dose     ASPIRIN LOW DOSE 81 MG EC tablet TAKE 1 TABLET BY MOUTH DAILY 100 tablet 1 Past Week at Unknown time     atorvastatin (LIPITOR) 40 MG tablet Take 1 tablet (40 mg) by mouth daily 90 tablet 0 10/28/2019 at Unknown time     buPROPion (WELLBUTRIN XL) 300 MG 24 hr tablet Take 1 tablet (300 mg) by mouth every morning 90 tablet 0 Past Week at Unknown time     levothyroxine (SYNTHROID/LEVOTHROID) 88 MCG tablet Take 1 tablet (88 mcg) by mouth daily 90 tablet 0 10/28/2019 at Unknown time     lisinopril (PRINIVIL/ZESTRIL) 5 MG tablet TAKE ONE TABLET BY MOUTH ONCE DAILY 90 tablet 0 10/28/2019 at Unknown time     metoprolol succinate ER (TOPROL-XL) 50 MG 24 hr tablet Take 1 tablet (50 mg) by mouth daily 90 tablet 00 10/28/2019 at Unknown time               Review of Systems:   A comprehensive greater than 10 system review of systems was carried out.  Pertinent positives and negatives are noted above.  Otherwise negative for contributory info.            Physical Exam:   Vitals were reviewed  Blood pressure 110/53, pulse  "53, temperature 97.7  F (36.5  C), temperature source Temporal, resp. rate 20, height 1.6 m (5' 3\"), weight 78.9 kg (174 lb), SpO2 93 %.  Exam:   GENERAL:  Comfortable.  PSYCH: pleasant, oriented, No acute distress.  EYES: PERRLA, Normal conjunctiva.  HEART:  Normal S1, S2 with no edema.  LUNGS:  Clear to auscultation, normal Respiratory effort.  ABDOMEN:  Soft, no hepatosplenomegaly, normal bowel sounds.  SKIN:  Dry to touch, No rash.  Neuro: Awake alert oriented.  CN's and Reflexes.           Data:   Past 24 hours labs, studies, and imaging were reviewed.  All laboratory data reviewed  All laboratory and imaging data in the past 24 hours reviewed  "

## 2019-10-28 NOTE — PROGRESS NOTES
Arrived to room 646 from PACU at 1300 via cart, transferred to bed via hover mat without difficulty, alert and oriented x 4, oriented to room and call system, dressing CDI, CMS intact, IV patent and infusing, hemovac and frank patent, rates pain 3/10, reviewed welcome folder and pain/medication information packet with patient and spouse. SCD's on BLE. Mark [employer] ice chips well. Frequent VS started.

## 2019-10-28 NOTE — ANESTHESIA POSTPROCEDURE EVALUATION
Patient: Rubio Gibbons    Procedure(s):  L4-S1 decompression and transforaminal lumbar interbody fusion    Diagnosis:Acquired spondylolisthesis of lumbosacral region [M43.17]  Diagnosis Additional Information: No value filed.    Anesthesia Type:  General, ETT    Note:  Anesthesia Post Evaluation    Patient location during evaluation: PACU  Patient participation: Able to fully participate in evaluation  Level of consciousness: sleepy but conscious  Pain management: adequate  Airway patency: patent  Cardiovascular status: acceptable  Respiratory status: acceptable  Hydration status: acceptable  PONV: controlled     Anesthetic complications: None          Last vitals:  Vitals:    10/28/19 1230 10/28/19 1235 10/28/19 1245   BP: 91/58 91/58    Pulse:  53    Resp: 13 17 12   Temp:      SpO2: 94% 93% (!) 89%         Electronically Signed By: Mateo Solorzano MD  October 28, 2019  12:52 PM

## 2019-10-28 NOTE — ANESTHESIA PREPROCEDURE EVALUATION
Anesthesia Pre-Procedure Evaluation    Patient: Rubio Gibbons   MRN: [id number] : 1945          Preoperative Diagnosis: Acquired spondylolisthesis of lumbosacral region [M43.17]    Procedure(s):  L4-S1 decompression and transforaminal lumbar interbody fusion    Past Medical History:   Diagnosis Date     Abdominal aortic aneurysm (AAA) without rupture (H) 10/13/2016     CAD (coronary artery disease)      Coronary artery disease involving native coronary artery of native heart without angina pectoris 10/14/2019    Per Note. Dr. López OV 13 - to establish care. He has a history of myocardial infarction back in  @ [facility] . Does not remember any details- and no stent.   Nuclear 3-12-13 LVEF 34% to establish care. He has a history of myocardial infarction back in , small to moderately sized partially reversible distal anterior anteroseptal and apical defect, small to moderately sized inferio     Diabetes mellitus type 2, uncomplicated (H) 2016    Diet      WIN (dyspnea on exertion) 2014     Heart attack (H)      History of myocardial infarction in adulthood 2016     HTN, goal below 140/90 2014     Hyperlipidemia LDL goal <100 10/31/2010     Hypothyroidism 2011     Hypothyroidism due to acquired atrophy of thyroid 2016     Ischemic cardiomyopathy      LBP (low back pain) 2014     Lumbar radiculopathy 9/10/2019     Microalbuminuria 11/7/2016    X1     Muscle spasm 2017     Other abnormal glucose 2014     Panlobular emphysema (H) 2016     Presbycusis syndrome, bilateral 2016     Right-sided low back pain without sciatica, unspecified chronicity 3/16/2017     Tobacco use disorder 2006     Tubular adenoma      Past Surgical History:   Procedure Laterality Date     ANGIOPLASTY       COLONOSCOPY N/A 2016    Procedure: COLONOSCOPY;  Surgeon: Adam Lantigua MD;  Location:  GI     Anesthesia Evaluation     . Pt has had prior  anesthetic. Type: General    No history of anesthetic complications          ROS/MED HX    ENT/Pulmonary:     (+)REJI risk factors hypertension, tobacco use, Current use mild COPD, , . .   (-) asthma and recent URI   Neurologic:  - neg neurologic ROS    (-) seizures and CVA   Cardiovascular:     (+) Dyslipidemia, hypertension--CAD, -past MI (1998),-. : . . . :. . Previous cardiac testing date:results:Stress Testdate:10/19 results:1. Myocardial perfusion imaging using single isotope technique  demonstrated a fixed apical/distal anteroseptal defect consistent with  a small nontransmural infarct. No ischemia appreciated in present  study.   2. Gated images demonstrated no regional wall motion abnormalities.   The left ventricular systolic function is normal with LVEF of 54% with  stress.ECG reviewed date:10/19 results:Sinus rate 53 date: results:         (-) angina, arrhythmias, valvular problems/murmurs, angina and CABG   METS/Exercise Tolerance:     Hematologic:  - neg hematologic  ROS       Musculoskeletal:  - neg musculoskeletal ROS       GI/Hepatic:  - neg GI/hepatic ROS      (-) GERD   Renal/Genitourinary:  - ROS Renal section negative       Endo:     (+) type II DM Not using insulin thyroid problem hypothyroidism, Obesity, .   (-) Type I DM and chronic steroid usage   Psychiatric:  - neg psychiatric ROS       Infectious Disease:  - neg infectious disease ROS       Malignancy:      - no malignancy   Other:    - neg other ROS                      Physical Exam  Normal systems: cardiovascular, pulmonary and dental    Airway   Mallampati: II  TM distance: >3 FB  Neck ROM: full    Dental     Cardiovascular   Rhythm and rate: regular and normal  (-) no friction rub, no systolic click and no murmur    Pulmonary    breath sounds clear to auscultation(-) no rhonchi, no decreased breath sounds, no wheezes, no rales and no stridor            Lab Results   Component Value Date    WBC 7.5 10/10/2019    HGB 14.7 10/10/2019     "HCT 44.8 10/10/2019     10/10/2019    CRP 1.4 01/30/2013     09/10/2019    POTASSIUM 4.6 10/25/2019    CHLORIDE 102 09/10/2019    CO2 29 09/10/2019    BUN 16 09/10/2019    CR 1.15 10/25/2019     (H) 10/10/2019    LAKSHMI 9.0 09/10/2019    ALBUMIN 4.1 09/10/2019    PROTTOTAL 7.2 09/10/2019    ALT 19 09/10/2019    AST 20 09/10/2019    ALKPHOS 76 09/10/2019    BILITOTAL 0.6 09/10/2019    LIPASE 108 03/16/2017    PTT 34 10/10/2019    INR 1.13 10/10/2019    TSH 5.35 (H) 09/10/2019    T4 1.08 09/10/2019       Preop Vitals  BP Readings from Last 3 Encounters:   10/24/19 126/74   10/16/19 137/60   10/10/19 128/81    Pulse Readings from Last 3 Encounters:   10/24/19 64   10/16/19 51   10/10/19 55      Resp Readings from Last 3 Encounters:   10/10/19 14   09/10/19 16   05/14/19 14    SpO2 Readings from Last 3 Encounters:   10/24/19 100%   10/10/19 94%   09/10/19 95%      Temp Readings from Last 1 Encounters:   10/24/19 98.1  F (36.7  C) (Oral)    Ht Readings from Last 1 Encounters:   10/16/19 1.6 m (5' 3\")      Wt Readings from Last 1 Encounters:   10/24/19 78.9 kg (174 lb)    Estimated body mass index is 30.82 kg/m  as calculated from the following:    Height as of 10/16/19: 1.6 m (5' 3\").    Weight as of 10/24/19: 78.9 kg (174 lb).       Anesthesia Plan      History & Physical Review  History and physical reviewed and following examination; no interval change.    ASA Status:  3 .    NPO Status:  > 8 hours    Plan for General and ETT with Intravenous induction. Maintenance will be Balanced.    PONV prophylaxis:  Ondansetron (or other 5HT-3) and Dexamethasone or Solumedrol       Postoperative Care  Postoperative pain management:  IV analgesics.      Consents  Anesthetic plan, risks, benefits and alternatives discussed with:  Patient or representative, Spouse and Patient..                 Mateo Solorzano MD                    .  "

## 2019-10-28 NOTE — OP NOTE
OPERATIVE REPORT        PREOPERATIVE DIAGNOSIS:   1.  Degenerative lumbar scoliosis with a 28 degree right convexity curve  2.  Grade 2 unstable L5-S1 spondylolisthesis with bilateral pars defects and severe foraminal stenosis  3.  L4-5 degenerative disc disease with mild foraminal stenosis right greater than left  4.  Low back pain  5.  Right lower extremity radiculopathy    POSTOPERATIVE DIAGNOSIS: Same     PROCEDURE: L5 Herrera-Talavera osteotomy and L4-5 bilateral decompressive laminectomy with L5-S1 transforaminal lumbar interbody fusion and  L4-S1 posterior instrumented fusion  1. L5 Herrera-Talavera (Gavino) osteotomy with complete facetectomy and interpedicular decompression with exposure of the L5 and S1 roots bilaterally   2.  L4-5 bilateral laminectomy, medial facetectomy and foraminotomies with decompression of the L4 exiting in the L5 traversing roots.  3.L5-S1 complete discectomy with arthrodesis and placement of a 8 x 26 mm Nuvasive TLX expandable interbody graft with locally harvested autologous bone placed centrally and anteriorly  4. Placement of L4-S1 Nuvasive Reline Traction pedicle screws bilaterally from L4 to S1  5. L4 - S1 posterior lateral fusion with placement of Medtronic Infuse and locally harvested autologous bone  6. Open reduction and internal fixation of L5-S1 unstable grade 2 spondylolisthesis  7. Use of Stealth and O-arm intraoperative neuronavigation  8. Use of portable X-ray and intraoperative microscope    ASSISTANT: Yohan Marvin    INDICATION FOR PROCEDURE: The patient is a 74 year old male that presented the above clinical and radiographic findings. After reviewing the examination and imaging studies, the decision was made to proceed with the above procedure. The patient understood the risks of surgery to be infection, CSF leak, nerve root injury, failure of hardware, the need for recurrent surgery, epidural fibrosis, weakness, coma and death. The patient voiced understanding and  wanted to proceed.     DESCRIPTION OF PROCEDURE: The patient was seen in the pre op area and the procedure was discussed with the patient and family once again and all questions were answered. The consent was then signed and the lumbar spine was marked with a marker. The patient was transported to the operating room on a stretcher and received general endotracheal anesthesia and a An catheter was placed. The patient was placed on the operating table in the prone position on the Proaxis table with all pressure points padded. The back was then prepped and draped in a normal sterile fashion and portable X-ray was used to identify the appropriate level. Local anesthesia was injected along the planned incision with 10 blade scalpel used to make a midline incision with dissection down through the subcutaneous tissue to the fascia. The fascia was opened bilaterally with the Bovie cautery. Subperiosteal dissection was used to expose the lamina facet joint and transverse processes from L4-S1. The Versatrac retractor was then placed to retract the paraspinous muscles. The operating microscope was the brought into the field and attention then turned to the decompression where a L5 Herrera-Talavera osteotomy was performed with the Midas Jb drill, the Kerrison rongeur and the pituitary rongeur which were used to remove the spinous process, bilateral lamina and facet joints. This completely decompressed and exposed the L5 exiting roots bilaterally and the S1 traversing roots bilaterally. Kambin's triangle was exposed from the left side and the L5-S1 disk space was incised with the 11 blade knife after retracting the thecal sac and nerve root medially. The disk was removed with the pituitary rongeur and the endplate antonia from size 6-7 mm. The nerves were thoroughly decompressed and a size 8 x 26 mm Nuvasive TLX expamndable interbody graft was placed at L5-S1 with autologous bone placed both anteriorly and centrally inside the  graft.  Attention then turned to the L4-5 level where L4-5 bilateral decompressive laminectomy with medial facetectomies and bilateral foraminotomies was performed with the Midas Jb drill, Kerrison rongeur and pituitary rongeur.  The Hernandez ball hook was then used to verify that the L4-5 nerves were totally decompressed. The Stealth and O-arm were then brought in for intraoperative pedicle screw placement and the pedicle screws were placed using the normal technique of a  hole with the Midas Jb drill, the gear shift probe to penetrate the pedicle and the 4.5 mm tap to tap the pedicle. The pedicle screws were then navigated down the pedicles from L4 to S1, bilaterally. The connecting rods were placed and the L5-S1 spondylolisthesis was reduced with reducers. The construct was compressed at L5-S1 and distracted at L4-5 and the connecting izzy was secured from L4-S1 and the locking caps were secured with the counter torque system.  I then placed 5 ml of Evicel were placed over the dura. Copious irrigation was performed with saline. The wound was irrigated once again and the retractors were removed. Decortication of the lateral facet and transverse process was performed from L4-S1 and Medtronic Infuse and locally harvested autologous bone were placed out laterally for the posterolateral fusion. Next, 2 Jermaine-Brenner drains were left subfascially. The fascia was closed with 0 Vicryl, the subcutaneous tissue closed with 2 - 0 and 3-0 Vicryl, and the skin closed with Dermabond. The patient was then transported back to the stretcher, extubated, and sent to recovery.     At the end of the case, all counts were correct    No complications    Estimated blood loss 100 ml     The patient received Ancef preoperatively and Vancomycin powder in the wound       Phillip Beavers MD, MS, FAANS

## 2019-10-28 NOTE — ANESTHESIA CARE TRANSFER NOTE
Patient: Rubio Gibbons    Procedure(s):  L4-S1 decompression and transforaminal lumbar interbody fusion    Diagnosis: Acquired spondylolisthesis of lumbosacral region [M43.17]  Diagnosis Additional Information: No value filed.    Anesthesia Type:   General, ETT     Note:  Airway :Face Mask  Patient transferred to:PACU  Comments: To PACU, adequate gas exchange, report to RN.Handoff Report: Identifed the Patient, Identified the Reponsible Provider, Reviewed the pertinent medical history, Discussed the surgical course, Reviewed Intra-OP anesthesia mangement and issues during anesthesia, Set expectations for post-procedure period and Allowed opportunity for questions and acknowledgement of understanding      Vitals: (Last set prior to Anesthesia Care Transfer)    CRNA VITALS  10/28/2019 1055 - 10/28/2019 1133      10/28/2019             Pulse:  67    SpO2:  100 %    Resp Rate (observed):  14                Electronically Signed By: WILLIAN Downs CRNA  October 28, 2019  11:33 AM

## 2019-10-29 ENCOUNTER — APPOINTMENT (OUTPATIENT)
Dept: PHYSICAL THERAPY | Facility: CLINIC | Age: 74
DRG: 455 | End: 2019-10-29
Attending: NEUROLOGICAL SURGERY
Payer: COMMERCIAL

## 2019-10-29 LAB
GLUCOSE BLDC GLUCOMTR-MCNC: 112 MG/DL (ref 70–99)
GLUCOSE BLDC GLUCOMTR-MCNC: 120 MG/DL (ref 70–99)
GLUCOSE BLDC GLUCOMTR-MCNC: 125 MG/DL (ref 70–99)
GLUCOSE BLDC GLUCOMTR-MCNC: 157 MG/DL (ref 70–99)
GLUCOSE BLDC GLUCOMTR-MCNC: 192 MG/DL (ref 70–99)

## 2019-10-29 PROCEDURE — 99232 SBSQ HOSP IP/OBS MODERATE 35: CPT | Performed by: INTERNAL MEDICINE

## 2019-10-29 PROCEDURE — 25000128 H RX IP 250 OP 636: Performed by: NEUROLOGICAL SURGERY

## 2019-10-29 PROCEDURE — 00000146 ZZHCL STATISTIC GLUCOSE BY METER IP

## 2019-10-29 PROCEDURE — 97116 GAIT TRAINING THERAPY: CPT | Mod: GP | Performed by: PHYSICAL THERAPIST

## 2019-10-29 PROCEDURE — 25000125 ZZHC RX 250: Performed by: NEUROLOGICAL SURGERY

## 2019-10-29 PROCEDURE — 25000132 ZZH RX MED GY IP 250 OP 250 PS 637: Performed by: NEUROLOGICAL SURGERY

## 2019-10-29 PROCEDURE — 97161 PT EVAL LOW COMPLEX 20 MIN: CPT | Mod: GP | Performed by: PHYSICAL THERAPIST

## 2019-10-29 PROCEDURE — 12000000 ZZH R&B MED SURG/OB

## 2019-10-29 PROCEDURE — 99207 ZZC CONSULT E&M CHANGED TO INITIAL LEVEL: CPT | Performed by: INTERNAL MEDICINE

## 2019-10-29 PROCEDURE — 97530 THERAPEUTIC ACTIVITIES: CPT | Mod: GP | Performed by: PHYSICAL THERAPIST

## 2019-10-29 RX ADMIN — POTASSIUM CHLORIDE AND SODIUM CHLORIDE: 900; 150 INJECTION, SOLUTION INTRAVENOUS at 04:28

## 2019-10-29 RX ADMIN — DOCUSATE SODIUM 100 MG: 100 CAPSULE, LIQUID FILLED ORAL at 09:18

## 2019-10-29 RX ADMIN — CEFAZOLIN SODIUM 2 G: 2 INJECTION, SOLUTION INTRAVENOUS at 00:23

## 2019-10-29 RX ADMIN — BUPROPION HYDROCHLORIDE 300 MG: 150 TABLET, FILM COATED, EXTENDED RELEASE ORAL at 09:18

## 2019-10-29 RX ADMIN — ACETAMINOPHEN 975 MG: 325 TABLET, FILM COATED ORAL at 14:44

## 2019-10-29 RX ADMIN — LEVOTHYROXINE SODIUM 88 MCG: 88 TABLET ORAL at 09:18

## 2019-10-29 RX ADMIN — LISINOPRIL 5 MG: 5 TABLET ORAL at 09:18

## 2019-10-29 RX ADMIN — DOCUSATE SODIUM 100 MG: 100 CAPSULE, LIQUID FILLED ORAL at 20:25

## 2019-10-29 RX ADMIN — FAMOTIDINE 20 MG: 10 INJECTION, SOLUTION INTRAVENOUS at 00:23

## 2019-10-29 RX ADMIN — ACETAMINOPHEN 975 MG: 325 TABLET, FILM COATED ORAL at 04:30

## 2019-10-29 RX ADMIN — CEFAZOLIN SODIUM 2 G: 2 INJECTION, SOLUTION INTRAVENOUS at 16:54

## 2019-10-29 RX ADMIN — RANITIDINE 150 MG: 150 TABLET ORAL at 14:44

## 2019-10-29 RX ADMIN — CEFAZOLIN SODIUM 2 G: 2 INJECTION, SOLUTION INTRAVENOUS at 09:18

## 2019-10-29 RX ADMIN — Medication: at 07:00

## 2019-10-29 RX ADMIN — ATORVASTATIN CALCIUM 40 MG: 40 TABLET, FILM COATED ORAL at 09:18

## 2019-10-29 RX ADMIN — ACETAMINOPHEN 975 MG: 325 TABLET, FILM COATED ORAL at 22:06

## 2019-10-29 ASSESSMENT — ACTIVITIES OF DAILY LIVING (ADL)
ADLS_ACUITY_SCORE: 12

## 2019-10-29 NOTE — PROGRESS NOTES
"[facility]    Neurosurgery Progress Note    Date of Service (when I saw the patient): 10/29/2019     Assessment & Plan   Rubio Gibbons is a 74 year old male who was admitted on 10/28/2019 with low back pain and RLE radiculopathy with grade 2 unstable L5-S1 spondylolisthesis. He underwent L4-S1 fusion with Dr. Beavers on 10/28/19.  He states his pain is well controlled.  He denies N/V.  He has noted improvement with his leg pain.  We reviewed plan for today for PT/OT and mobilize.  He understands we are switching from his PCA to oral pain medications.     Active Problems:    S/P cervical spinal fusion    Assessment: Post fusion    Plan:  -Pain Control  -Mobilize and PT  -discontinue PCA      I have discussed the following assessment and plan with Dr. Beavers who is in agreement with initial plan and will follow up with further consultation recommendations.    Bibi Mason CNP  Tel [phone]  [facility]      Interval History   Day 1 post op; stable    Physical Exam   Temp: 97.2  F (36.2  C) Temp src: Axillary BP: 118/68 Pulse: 55 Heart Rate: 58 Resp: 16 SpO2: 94 % O2 Device: None (Room air) Oxygen Delivery: 2 LPM  Vitals:    10/28/19 0635   Weight: 174 lb (78.9 kg)     Vital Signs with Ranges  Temp:  [95.1  F (35.1  C)-99.1  F (37.3  C)] 97.2  F (36.2  C)  Pulse:  [53-55] 55  Heart Rate:  [52-66] 58  Resp:  [12-20] 16  BP: ()/(42-70) 118/68  SpO2:  [92 %-100 %] 94 %  I/O last 3 completed shifts:  In: 3669.9 [P.O.:720; I.V.:2949.9]  Out: 2070 [Urine:1725; Drains:245; Blood:100]    Heart Rate: 58, Blood pressure 118/68, pulse 55, temperature 97.2  F (36.2  C), temperature source Axillary, resp. rate 16, height 5' 3\" (1.6 m), weight 174 lb (78.9 kg), SpO2 94 %.  174 lbs 0 oz  HEENT:  Normocephalic, atraumatic.  EOM s intact.    Heart:  No peripheral edema  Lungs:  No SOB  Skin:  Warm and dry, good capillary refill. NIRAJ x2 intact  Extremities:  Good radial and dorsalis pedis pulses bilaterally, no " edema, cyanosis or clubbing.    NEUROLOGICAL EXAMINATION:   Mental status:  Alert and Oriented x 3, speech is fluent.  Cranial nerves:  II-XII intact.   Motor: DF/PF 5/5 bilaterally; raises legs off of bed.  Sensation:  Intact  Gait:  Deferred; sitting upright.     Medications     0.9% sodium chloride + KCl 20 mEq/L Stopped (10/29/19 1032)     HYDROmorphone Stopped (10/29/19 1030)       acetaminophen  975 mg Oral Q8H     atorvastatin  40 mg Oral Daily     buPROPion  300 mg Oral QAM     ceFAZolin  2 g Intravenous Q8H     docusate sodium  100 mg Oral BID     ranitidine  150 mg Oral Q12H    Or     famotidine  20 mg Intravenous Q12H     insulin aspart  1-7 Units Subcutaneous TID AC     insulin aspart  1-5 Units Subcutaneous At Bedtime     levothyroxine  88 mcg Oral Daily     lisinopril  5 mg Oral Daily     metoprolol succinate ER  50 mg Oral Daily     sodium chloride (PF)  3 mL Intracatheter Q8H       Data   CBC RESULTS:   Recent Labs   Lab Test 10/10/19  1101   WBC 7.5   RBC 4.59   HGB 14.7   HCT 44.8   MCV 98   MCH 32.0   MCHC 32.8   RDW 13.9        Basic Metabolic Panel:  Lab Results   Component Value Date     09/10/2019      Lab Results   Component Value Date    POTASSIUM 4.6 10/25/2019     Lab Results   Component Value Date    CHLORIDE 102 09/10/2019     Lab Results   Component Value Date    LAKSHMI 9.0 09/10/2019     Lab Results   Component Value Date    CO2 29 09/10/2019     Lab Results   Component Value Date    BUN 16 09/10/2019     Lab Results   Component Value Date    CR 1.15 10/25/2019     Lab Results   Component Value Date     10/10/2019     INR:  Lab Results   Component Value Date    INR 1.13 10/10/2019

## 2019-10-29 NOTE — PLAN OF CARE
Pt A&Ox4. Up with A1,walker, brace. Surgical dressing CDI. CMS intact. Pain managed with PCA dilaudid. Juve patent. @ [facility]. R-55cc, L20cc. Bowels hypoactive. Poor appetite. Tolerating clear liquid. Blood sugars 151, 153. VSS, Capno on 2L/min. Plans to go home at discharge.

## 2019-10-29 NOTE — PLAN OF CARE
Physical Therapy Discharge Summary    Reason for therapy discharge:    All goals and outcomes met, no further needs identified.    Progress towards therapy goal(s). See goals on Care Plan in [facility] electronic health record for goal details.  Goals met    Therapy recommendation(s):    No further therapy is recommended.  Continue walking program and supervision from spouse

## 2019-10-29 NOTE — PLAN OF CARE
OT - patient chart reviewed, discussed with PT, per PT patient has skilled OT needs but has firmly declined OT services. Due to patient requests will discharge from OT services, if patient changes mind then please reorder OT.

## 2019-10-29 NOTE — PLAN OF CARE
"VS /58 (BP Location: Left arm)   Pulse 55   Temp 95.8  F (35.4  C) (Oral)   Resp 14   Ht 1.6 m (5' 3\")   Wt 78.9 kg (174 lb)   SpO2 94%   BMI 30.82 kg/m    Lung sounds Clear and equal bilaterally  O2 1 L NC on Capnography  Tele no Telemetry  Bowel sounds Hypoactive- Not passing gas  Tolerating Mod CHO diet  IVF 75 mL/hr NS &KCL 20 mEq Maintenance fluids infusing  [place] dressing in lower back CDI- Gauze tape over NIRAJ drains- reinforced  Tests None  CMS Intact  Drains 2 NIRAJ Drains Left Output: 20 mL   Right Output:  15 mL  Activity up with Ax1 GB Back Brace and Walker  Pain Denies- PCA infusing per patient manipulation  Patient/family centered care Support Given and Questions answered  Discharge plan Home when medically stable. Patient slept well throughout the night- An pulled this shift, (1250 output during NOC) Due to Void. Bilateral NIRAJ drains patent and draining. Patient does run Bradycardic. Capnography in place until 1:30 p.m. Continue POC.    "

## 2019-10-29 NOTE — PROGRESS NOTES
10/29/19 1045   Quick Adds   Type of Visit Initial PT Evaluation   Living Environment   Lives With spouse   [place]   Home Accessibility stairs within home   Number of Stairs, Within Home, Primary other (see comments)  (12)   Stair Railings, Within Home, Primary other (see comments)  (rails present; change sides after 6)   Transportation Anticipated family or friend will provide   Living Environment Comment lives in a townhome; no stairs to enter at garage level; stairs to access main level   Self-Care   Usual Activity Tolerance good   Current Activity Tolerance good   Equipment Currently Used at Home none   Activity/Exercise/Self-Care Comment normally independent and active   Functional Level Prior   Ambulation 0-->independent   Transferring 0-->independent   Toileting 0-->independent   Bathing 0-->independent   Communication 0-->understands/communicates without difficulty   Swallowing 0-->swallows foods/liquids without difficulty   Cognition 0 - no cognition issues reported   Fall history within last six months no   Which of the above functional risks had a recent onset or change? ambulation;transferring   Prior Functional Level Comment patient reports independence; occasionally holds onto furniture in house   General Information   Onset of Illness/Injury or Date of Surgery - Date 10/28/19   Referring Physician Phillip Beavers MD   Patient/Family Goals Statement return home with spouse   Pertinent History of Current Problem (include personal factors and/or comorbidities that impact the POC) per chart: patient seen POD #1 s/p  L5 Herrera-Talavera osteotomy and L4-5 bilateral decompressive laminectomy with L5-S1 transforaminal lumbar interbody fusion and  L4-S1 posterior instrumented fusion; see medical record for further information   Precautions/Limitations spinal precautions   General Observations spouse present; patient impulsive   General Info Comments Activity:  ambulate   Cognitive Status Examination   Orientation orientation to person, place and time   Level of Consciousness alert   Follows Commands and Answers Questions 75% of the time;100% of the time   Personal Safety and Judgment impaired;impulsive;at risk behaviors demonstrated  (baseline)   Memory intact   Pain Assessment   Patient Currently in Pain No   Integumentary/Edema   Integumentary/Edema Comments 2 NIRAJ drains; spinal incision   Range of Motion (ROM)   ROM Comment trunk limited by spinal precautions; others appear WFL   Strength   Strength Comments trunk limited by recent spinal surgery; others appear WFL   Bed Mobility   Bed Mobility Comments SBA and cues for spinal precautions/logroll   Transfer Skills   Transfer Comments sit<>stand with CGA and safety cues to slow down; CGA/SBA for toilet transfers; impulsive with movements   Gait   Gait Comments able to ambulate with use of a walker initially; tends to not use it all the time; able to ambulate in room to bathroom with use of walker and CGA; cues to slow down for safety; mild unsteadiness   Balance   Balance Comments mild impairment; some baseline impairment; occasionally holds onto objects   Sensory Examination   Sensory Perception Comments intact per patient report; denies numbness or tingling   Modality Interventions   Planned Modality Interventions Comments ice packs to back   General Therapy Interventions   Planned Therapy Interventions bed mobility training;gait training;transfer training;other (see comments)  (spine education and walking program)   Clinical Impression   Criteria for Skilled Therapeutic Intervention yes, treatment indicated   PT Diagnosis impaired gait/transfers   Influenced by the following impairments impulsive with movement; decreased following of spinal precautions; baseline balance impairment   Functional limitations due to impairments impaired independence with functional mobility   Clinical Presentation Stable/Uncomplicated  "  Clinical Presentation Rationale clinical judgement   Clinical Decision Making (Complexity) Low complexity   Therapy Frequency 2x/day   Predicted Duration of Therapy Intervention (days/wks) 1-2 days   Anticipated Discharge Disposition Home with Assist   Risk & Benefits of therapy have been explained Yes   Patient, Family & other staff in agreement with plan of care Yes   [facility] TM \"6 Clicks\"   2016, Trustees of [facility], under license to [employer].  All rights reserved.   6 Clicks Short Forms Basic Mobility Inpatient Short Form   [facility]  \"6 Clicks\" V.2 Basic Mobility Inpatient Short Form   1. Turning from your back to your side while in a flat bed without using bedrails? 3 - A Little   2. Moving from lying on your back to sitting on the side of a flat bed without using bedrails? 3 - A Little   3. Moving to and from a bed to a chair (including a wheelchair)? 3 - A Little   4. Standing up from a chair using your arms (e.g., wheelchair, or bedside chair)? 3 - A Little   5. To walk in hospital room? 3 - A Little   6. Climbing 3-5 steps with a railing? 3 - A Little   Basic Mobility Raw Score (Score out of 24.Lower scores equate to lower levels of function) 18   Total Evaluation Time   Total Evaluation Time (Minutes) 10     "

## 2019-10-29 NOTE — PROGRESS NOTES
[facility]    Hospitalist Progress Note  Name: Rubio Gibbons    MRN: [id number]  Provider: Anjana Reilly MD  Date of Service: 10/29/2019    Assessment & Plan   Summary of Stay: Rubio Gibbons is a 74 year old male who was admitted on 10/28/2019 for elective L4-S1 decompression and transforaminal lumbar body fusion.  Medicine team was consulted for comanagement      His past medical history significant for coronary artery disease, abdominal aortic aneurysm, hypertension, hyperlipidemia, hypothyroidism, ischemic cardiomyopathy, hospital diagnosis of emphysema and history of tobacco use.     Status post L4 S1 decompression and fusion  --Postop care, pain management, anticoagulation and physical therapy per primary service  --Estimated blood loss 100ml  --Pain is well controlled today.  Tolerated physical therapy.     Diabetes mellitus  --Given A1c in February 2019 was 6.6  --Not on any medications prior to admission  --We will continue sliding scale insulin     History of coronary artery disease  --Continue aspirin and statin  --Resume beta-blocker and ACE inhibitor with parameters for blood pressure  --Stable      Hypertension  --Prior to admission taking lisinopril 5 mg and metoprolol 50 mg daily  --We will resume antihypertensives with parameters  --Patient was mildly hypotensive in PACU     Hypothyroidism  --Resumed Synthroid     Hyperlipidemia  --Resumed statins       DVT Prophylaxis: Defer to primary service  Code Status: Full Code    Disposition: Expected discharge 1 to 2 days defer to primary team      Interval History   Chart patient doing well today.  PCA discontinued pain is well controlled.  Tolerating physical therapy.  Review of all the other symptoms are negative.    -Data reviewed today: I reviewed all new labs and imaging reports over the last 24 hours. I personally reviewed no images or EKG's today.    Physical Exam   Temp: 97.2  F (36.2  C) Temp src: Axillary BP: 118/68 Pulse: 55 Heart  Rate: 58 Resp: 16 SpO2: 94 % O2 Device: None (Room air) Oxygen Delivery: 2 LPM  Vitals:    10/28/19 0635   Weight: 78.9 kg (174 lb)     Vital Signs with Ranges  Temp:  [95.1  F (35.1  C)-99.1  F (37.3  C)] 97.2  F (36.2  C)  Pulse:  [55] 55  Heart Rate:  [53-66] 58  Resp:  [13-16] 16  BP: ()/(58-70) 118/68  SpO2:  [93 %-100 %] 94 %  I/O last 3 completed shifts:  In: 3669.9 [P.O.:720; I.V.:2949.9]  Out: 2070 [Urine:1725; Drains:245; Blood:100]      GENERAL:  Comfortable.  PSYCH: pleasant, oriented, No acute distress.  EYES: PERRLA, Normal conjunctiva.  HEART:  Normal S1, S2 with no edema.  LUNGS:  Clear to auscultation, normal Respiratory effort.  ABDOMEN:  Soft, no hepatosplenomegaly, normal bowel sounds.  SKIN:  Dry to touch, No rash.  Neuro: Awake alert oriented.  CN's and Reflexes.    Medications     0.9% sodium chloride + KCl 20 mEq/L Stopped (10/29/19 1032)     HYDROmorphone Stopped (10/29/19 1030)       acetaminophen  975 mg Oral Q8H     atorvastatin  40 mg Oral Daily     buPROPion  300 mg Oral QAM     ceFAZolin  2 g Intravenous Q8H     docusate sodium  100 mg Oral BID     ranitidine  150 mg Oral Q12H    Or     famotidine  20 mg Intravenous Q12H     insulin aspart  1-7 Units Subcutaneous TID AC     insulin aspart  1-5 Units Subcutaneous At Bedtime     levothyroxine  88 mcg Oral Daily     lisinopril  5 mg Oral Daily     metoprolol succinate ER  50 mg Oral Daily     sodium chloride (PF)  3 mL Intracatheter Q8H     Data     No results for input(s): WBC, HGB, HCT, MCV, PLT in the last 168 hours.  Recent Labs   Lab 10/25/19  1238   POTASSIUM 4.6   CR 1.15   GFRESTIMATED 62   GFRESTBLACK 71     No results for input(s): CULT in the last 168 hours.  No results for input(s): AST, ALT, GGT, ALKPHOS, BILITOTAL, BILICONJ, BILIDIRECT, SHELDON in the last 168 hours.    Invalid input(s): BILIRUBININDIRECT  No results for input(s): INR in the last 168 hours.  No results for input(s): LACT in the last 168 hours.    No  results found for this or any previous visit (from the past 24 hour(s)).

## 2019-10-30 VITALS
TEMPERATURE: 98.6 F | HEART RATE: 58 BPM | RESPIRATION RATE: 16 BRPM | HEIGHT: 63 IN | SYSTOLIC BLOOD PRESSURE: 118 MMHG | BODY MASS INDEX: 30.83 KG/M2 | DIASTOLIC BLOOD PRESSURE: 71 MMHG | WEIGHT: 174 LBS | OXYGEN SATURATION: 95 %

## 2019-10-30 LAB
GLUCOSE BLDC GLUCOMTR-MCNC: 107 MG/DL (ref 70–99)
GLUCOSE BLDC GLUCOMTR-MCNC: 120 MG/DL (ref 70–99)
GLUCOSE BLDC GLUCOMTR-MCNC: 126 MG/DL (ref 70–99)
GLUCOSE BLDC GLUCOMTR-MCNC: 170 MG/DL (ref 70–99)

## 2019-10-30 PROCEDURE — 00000146 ZZHCL STATISTIC GLUCOSE BY METER IP

## 2019-10-30 PROCEDURE — 25000128 H RX IP 250 OP 636: Performed by: NEUROLOGICAL SURGERY

## 2019-10-30 PROCEDURE — 99232 SBSQ HOSP IP/OBS MODERATE 35: CPT | Performed by: INTERNAL MEDICINE

## 2019-10-30 PROCEDURE — 25000132 ZZH RX MED GY IP 250 OP 250 PS 637: Performed by: NEUROLOGICAL SURGERY

## 2019-10-30 RX ORDER — LIDOCAINE HYDROCHLORIDE 20 MG/ML
10 JELLY TOPICAL ONCE
Status: DISCONTINUED | OUTPATIENT
Start: 2019-10-30 | End: 2019-10-30 | Stop reason: HOSPADM

## 2019-10-30 RX ORDER — METHOCARBAMOL 500 MG/1
500 TABLET, FILM COATED ORAL 4 TIMES DAILY PRN
Qty: 60 TABLET | Refills: 0 | Status: SHIPPED | OUTPATIENT
Start: 2019-10-30 | End: 2020-08-14

## 2019-10-30 RX ORDER — HYDROMORPHONE HYDROCHLORIDE 2 MG/1
2-4 TABLET ORAL EVERY 6 HOURS PRN
Qty: 50 TABLET | Refills: 0 | Status: SHIPPED | OUTPATIENT
Start: 2019-10-30 | End: 2020-08-14

## 2019-10-30 RX ADMIN — DOCUSATE SODIUM 100 MG: 100 CAPSULE, LIQUID FILLED ORAL at 09:20

## 2019-10-30 RX ADMIN — CEFAZOLIN SODIUM 2 G: 2 INJECTION, SOLUTION INTRAVENOUS at 16:34

## 2019-10-30 RX ADMIN — LEVOTHYROXINE SODIUM 88 MCG: 88 TABLET ORAL at 09:20

## 2019-10-30 RX ADMIN — METOPROLOL SUCCINATE 50 MG: 50 TABLET, EXTENDED RELEASE ORAL at 09:19

## 2019-10-30 RX ADMIN — CEFAZOLIN SODIUM 2 G: 2 INJECTION, SOLUTION INTRAVENOUS at 09:22

## 2019-10-30 RX ADMIN — ACETAMINOPHEN 975 MG: 325 TABLET, FILM COATED ORAL at 14:01

## 2019-10-30 RX ADMIN — RANITIDINE 150 MG: 150 TABLET ORAL at 01:37

## 2019-10-30 RX ADMIN — RANITIDINE 150 MG: 150 TABLET ORAL at 14:01

## 2019-10-30 RX ADMIN — ACETAMINOPHEN 975 MG: 325 TABLET, FILM COATED ORAL at 05:16

## 2019-10-30 RX ADMIN — ATORVASTATIN CALCIUM 40 MG: 40 TABLET, FILM COATED ORAL at 09:20

## 2019-10-30 RX ADMIN — LISINOPRIL 5 MG: 5 TABLET ORAL at 09:19

## 2019-10-30 RX ADMIN — BUPROPION HYDROCHLORIDE 300 MG: 150 TABLET, FILM COATED, EXTENDED RELEASE ORAL at 09:20

## 2019-10-30 RX ADMIN — CEFAZOLIN SODIUM 2 G: 2 INJECTION, SOLUTION INTRAVENOUS at 01:37

## 2019-10-30 ASSESSMENT — ACTIVITIES OF DAILY LIVING (ADL)
ADLS_ACUITY_SCORE: 12

## 2019-10-30 NOTE — PROGRESS NOTES
[facility]    Hospitalist Progress Note  Name: Rubio Gibbons    MRN: [id number]  Provider: Anjana Reilly MD  Date of Service: 10/30/2019    Assessment & Plan   Summary of Stay: Rubio Gibbons is a 74 year old male who was admitted on 10/28/2019 for elective L4-S1 decompression and transforaminal lumbar body fusion.  Medicine team was consulted for comanagement      His past medical history significant for coronary artery disease, abdominal aortic aneurysm, hypertension, hyperlipidemia, hypothyroidism, ischemic cardiomyopathy, hospital diagnosis of emphysema and history of tobacco use.     Status post L4 S1 decompression and fusion  --Postop care, pain management, anticoagulation and physical therapy per primary service  --Pain is well controlled today.  Tolerated physical therapy.     Diabetes mellitus  --Given A1c in February 2019 was 6.6  --Not on any medications prior to admission  --Sliding scale coverage not needed in last 48 hours     History of coronary artery disease  --Continue aspirin and statin  --Resume beta-blocker and ACE inhibitor with parameters for blood pressure  --Stable      Hypertension  --Prior to admission taking lisinopril 5 mg and metoprolol 50 mg daily  --We will resume antihypertensives with parameters       Hypothyroidism  --Resumed Synthroid     Hyperlipidemia  --Resumed statins       DVT Prophylaxis: Defer to primary service  Code Status: Full Code    Disposition: Expected discharge today after drain is out per primary team    Interval History   Chart reviewed patient doing well today.   Review of all the other symptoms are negative.    -Data reviewed today: I reviewed all new labs and imaging reports over the last 24 hours. I personally reviewed no images or EKG's today.    Physical Exam   Temp: 97.6  F (36.4  C) Temp src: Oral BP: (!) 145/70 Pulse: 58 Heart Rate: 66 Resp: 16 SpO2: 97 % O2 Device: None (Room air)    Vitals:    10/28/19 0635   Weight: 78.9 kg (174 lb)      Vital Signs with Ranges  Temp:  [97.2  F (36.2  C)-98.3  F (36.8  C)] 97.6  F (36.4  C)  Pulse:  [58-64] 58  Heart Rate:  [66] 66  Resp:  [16-18] 16  BP: (135-151)/(66-70) 145/70  SpO2:  [94 %-97 %] 97 %  I/O last 3 completed shifts:  In: 1346 [P.O.:1340; I.V.:6]  Out: 2185 [Urine:1975; Drains:210]      GENERAL:  Comfortable.  PSYCH: pleasant, oriented, No acute distress.  EYES: PERRLA, Normal conjunctiva.  HEART:  Normal S1, S2 with no edema.  LUNGS:  Clear to auscultation, normal Respiratory effort.  ABDOMEN:  Soft, no hepatosplenomegaly, normal bowel sounds.  SKIN:  Dry to touch, No rash.  Neuro: Awake alert oriented.  CN's and Reflexes.    Medications     0.9% sodium chloride + KCl 20 mEq/L Stopped (10/29/19 1032)     HYDROmorphone Stopped (10/29/19 1030)       acetaminophen  975 mg Oral Q8H     atorvastatin  40 mg Oral Daily     buPROPion  300 mg Oral QAM     ceFAZolin  2 g Intravenous Q8H     docusate sodium  100 mg Oral BID     ranitidine  150 mg Oral Q12H    Or     famotidine  20 mg Intravenous Q12H     insulin aspart  1-7 Units Subcutaneous TID AC     insulin aspart  1-5 Units Subcutaneous At Bedtime     levothyroxine  88 mcg Oral Daily     lidocaine  10 mL Urethral Once     lisinopril  5 mg Oral Daily     metoprolol succinate ER  50 mg Oral Daily     sodium chloride (PF)  3 mL Intracatheter Q8H     Data     No results for input(s): WBC, HGB, HCT, MCV, PLT in the last 168 hours.  Recent Labs   Lab 10/25/19  1238   POTASSIUM 4.6   CR 1.15   GFRESTIMATED 62   GFRESTBLACK 71     No results for input(s): CULT in the last 168 hours.  No results for input(s): AST, ALT, GGT, ALKPHOS, BILITOTAL, BILICONJ, BILIDIRECT, SHELDON in the last 168 hours.    Invalid input(s): BILIRUBININDIRECT  No results for input(s): INR in the last 168 hours.  No results for input(s): LACT in the last 168 hours.    No results found for this or any previous visit (from the past 24 hour(s)).

## 2019-10-30 NOTE — PROGRESS NOTES
VSS. IV removed. Belongings with patient in room. Discharge instructions and medications reviewed with patient and spouse. Patient left hospital in wheelchair with staff and spouse.

## 2019-10-30 NOTE — PLAN OF CARE
"  [place]: A&O, [place]  CMS: strong dorsi/plant, normal pulses, denies numbness and tingling  VS: /66   Pulse 55   Temp 97.2  F (36.2  C) (Oral)   Resp 18   Ht 1.6 m (5' 3\")   Wt 78.9 kg (174 lb)   SpO2 95%   BMI 30.82 kg/m    LS: clear on RA, denies SOB  GI: active, passing gas, no BM since surg  : straight cath at 1800 for 650.  Skin: back incision, CDI  Activity: x1, GB, brace   Diet: mod carb   Pain: denies, PCA pump discontinued this am  Lab: , 112, 190. NIRAJ 1 output 60, NIRAJ 2 output 30  Plan: PT/OT, ancef, pain control           "

## 2019-10-30 NOTE — PROGRESS NOTES
"[facility]  Neurosurgery Progress Note  Phillip Beavers MD       2 Days Post-Op  Procedure(s):  L5 Smith-Talavera osteotomy and L4-5 bilateral decompressive laminectomy with L5-S1 transforaminal lumbar interbody fusion and  L4-S1 posterior instrumented fusion    Interval history: Doing well and pain controlled. Has been ambulating in halls with therapy. He would like to go home later today.      Vital signs:   Blood pressure (!) 145/70, pulse 58, temperature 97.6  F (36.4  C), temperature source Oral, resp. rate 16, height 1.6 m (5' 3\"), weight 78.9 kg (174 lb), SpO2 97 %.    Date 10/30/19 0700 - 10/31/19 0659   Shift 6813-8025 5636-7409 8072-0726 24 Hour Total   INTAKE   P.O. 200   200   Shift Total(mL/kg) 200(2.53)   200(2.53)   OUTPUT   Urine 225   225   Shift Total(mL/kg) 225(2.85)   225(2.85)   Weight (kg) 78.92 78.92 78.92 78.92       Exam:   Stable       Active Problems:  S/P lumbar spinal fusion      Plan:    Doing well  Mobilize today  May discharge home later today when NIRAJ drain removed and if he continues to do well      Phillip Beavers MD, MS, FAANS  [facility]  [phone] (office)  [phone] (Care coordinator)    "

## 2019-10-30 NOTE — PLAN OF CARE
Alert and Oriented. Pain managed with scheduled Tylenol. Assist of 1 with a gait belt and Back Brace. 1JP still in place. Voiding small amounts. Bladder scanned. Declined straight cath.

## 2019-10-31 ENCOUNTER — HEALTH MAINTENANCE LETTER (OUTPATIENT)
Age: 74
End: 2019-10-31

## 2019-10-31 ENCOUNTER — TELEPHONE (OUTPATIENT)
Dept: FAMILY MEDICINE | Facility: CLINIC | Age: 74
End: 2019-10-31

## 2019-10-31 NOTE — TELEPHONE ENCOUNTER
"Hospital/TCU/ED for chronic condition Discharge Protocol    \"Hi, my name is Monique Brown RN, a registered nurse, and I am calling from [facility].  I am calling to follow up and see how things are going for you after your recent emergency visit/hospital/TCU stay.\"    Tell me how you are doing now that you are home?\" in pain but doing ok.  No BM yet.  Encouraged taking stool softener.        Discharge Instructions    \"Let's review your discharge instructions.  What is/are the follow-up recommendations?  Pt. Response: see Dr. Goddard 11/18/19 and call [facility] for 6 wk postop appt.    \"Has an appointment with your primary care provider been scheduled?\"   Yes. (confirm)    \"When you see the provider, I would recommend that you bring your medications with you.\"    Medications    \"Tell me what changed about your medicines when you discharged?\"    Changes to chronic meds?    0-1    \"What questions do you have about your medications?\"    None     New diagnoses of heart failure, COPD, diabetes, or MI?    No              Post Discharge Medication Reconciliation Status: discharge medications reconciled, continue medications without change.    Was MTM referral placed (*Make sure to put transitions as reason for referral)?   No    Call Summary    \"What questions or concerns do you have about your recent visit and your follow-up care?\"     none    \"If you have questions or things don't continue to improve, we encourage you contact us through the main clinic number (give number).  Even if the clinic is not open, triage nurses are available 24/7 to help you.     We would like you to know that our clinic has extended hours (provide information).  We also have urgent care (provide details on closest location and hours/contact info)\"      \"Thank you for your time and take care!\"       Monique Brown RN        "

## 2019-11-07 NOTE — DISCHARGE SUMMARY
[facility] Discharge Summary    Rubio Gibbons MRN# [id number]   Age: 74 year old YOB: 1945     Date of Admission:  10/28/2019  Date of Discharge::  10/30/2019  6:14 PM   Admitting Physician:  Phillip Beavers MD  Discharge Physician:  Phillip Beavers MD    Home clinic: [facility]          Admission Diagnoses:   Acquired spondylolisthesis of lumbosacral region [M43.17]          Discharge Diagnosis:     Patient Active Problem List   Diagnosis     Tobacco use disorder     HYPERLIPIDEMIA LDL GOAL <100     Advanced directives, counseling/discussion     Essential hypertension with goal blood pressure less than 140/90     WIN (dyspnea on exertion)     Hypothyroidism due to acquired atrophy of thyroid     Panlobular emphysema (H)     History of myocardial infarction in adulthood     Presbycusis syndrome, bilateral     Diabetes mellitus type 2, uncomplicated (H)     Abdominal aortic aneurysm (AAA) without rupture (H)     Microalbuminuria     Right-sided low back pain without sciatica, unspecified chronicity     Muscle spasm     Gait disturbance     Lumbar radiculopathy     Encounter for smoking cessation counseling     DDD (degenerative disc disease), lumbar     Coronary artery disease involving native coronary artery of native heart without angina pectoris     S/P cervical spinal fusion             Procedures:   Procedure(s):  L5 Smith-Talavera osteotomy and L4-5 bilateral decompressive laminectomy with L5-S1 transforaminal lumbar interbody fusion and  L4-S1 posterior instrumented fusion          Medications Prior to Admission:     No medications prior to admission.             Discharge Medications:     Discharge Medication List as of 10/30/2019  5:20 PM      START taking these medications    Details   HYDROmorphone (DILAUDID) 2 MG tablet Take 1-2 tablets (2-4 mg) by mouth every 6 hours as needed, Disp-50 tablet, R-0, Local Print      methocarbamol (ROBAXIN)  500 MG tablet Take 1 tablet (500 mg) by mouth 4 times daily as needed for muscle spasms, Disp-60 tablet, R-0, Local Print         CONTINUE these medications which have CHANGED    Details   aspirin (ASPIRIN LOW DOSE) 81 MG EC tablet Take 1 tablet (81 mg) by mouth daily, Disp-1 tablet, R-0, Local Print         CONTINUE these medications which have NOT CHANGED    Details   atorvastatin (LIPITOR) 40 MG tablet Take 1 tablet (40 mg) by mouth daily, Disp-90 tablet, R-0, E-PrescribeProfile Rx: patient will contact pharmacy when needed      buPROPion (WELLBUTRIN XL) 300 MG 24 hr tablet Take 1 tablet (300 mg) by mouth every morning, Disp-90 tablet, R-0, E-PrescribeTake once daily after 7 days.      levothyroxine (SYNTHROID/LEVOTHROID) 88 MCG tablet Take 1 tablet (88 mcg) by mouth daily, Disp-90 tablet, R-0, E-PrescribeProfile Rx: patient will contact pharmacy when needed      lisinopril (PRINIVIL/ZESTRIL) 5 MG tablet TAKE ONE TABLET BY MOUTH ONCE DAILY, Disp-90 tablet, R-0, E-PrescribeProfile Rx: patient will contact pharmacy when needed      metoprolol succinate ER (TOPROL-XL) 50 MG 24 hr tablet Take 1 tablet (50 mg) by mouth daily, Disp-90 tablet, R-00, E-PrescribeProfile Rx: patient will contact pharmacy when needed                   Consultations:   PT  OT  Hospitalist           Brief History of Illness:    Rubio Gibbons is a 74 year old male that is 10 Days Post-Op s/p Procedure(s):  L5 Smith-Talavera osteotomy and L4-5 bilateral decompressive laminectomy with L5-S1 transforaminal lumbar interbody fusion and  L4-S1 posterior instrumented fusion.              Hospital Course:   The patient did well following the procedure and work with physical therapy and was able to ambulate much better.  His pain was also controlled and he had improvement in his bilateral lower extremities.  He was found stable for discharge.          Discharge Instructions and Follow-Up:     Discharge diet: Regular normal diet   Discharge activity:  Lifting restricted to 10 pounds until follow up  No lifting or strenuous exercise for 6 week(s)  No heavy lifting, pushing, pulling for 6 week(s)  Do not submerge your incision underwater as in hot tub, pool or lake water for 6 weeks. Ok to take showers and bathe in water below your incision.   Discharge follow-up:   Schedule Neurosurgery follow up appointment with either Bibi Mason CNP and Dr. Phillip Beavers at [facility] by calling [phone] for the Care Coordinator Danika or [phone] for [facility] general number in 4-6 weeks.    If sutures or staples were placed, please schedule an appointment for wound check and staple/suture removal in 10-14 days by calling [phone] for the Care Coordinator Danika or by calling the general [facility] line at [phone].            Discharge Disposition:     Discharged to home      Attestation:  I have reviewed today's vital signs, notes, medications, labs and imaging.  Amount of time performed on this discharge summary: 10 minutes.    Phillip Beavers MD

## 2019-11-18 ENCOUNTER — OFFICE VISIT (OUTPATIENT)
Dept: FAMILY MEDICINE | Facility: CLINIC | Age: 74
End: 2019-11-18
Payer: COMMERCIAL

## 2019-11-18 VITALS
TEMPERATURE: 97.7 F | WEIGHT: 170.8 LBS | RESPIRATION RATE: 18 BRPM | OXYGEN SATURATION: 97 % | DIASTOLIC BLOOD PRESSURE: 81 MMHG | BODY MASS INDEX: 30.26 KG/M2 | HEART RATE: 62 BPM | SYSTOLIC BLOOD PRESSURE: 123 MMHG

## 2019-11-18 DIAGNOSIS — E03.4 HYPOTHYROIDISM DUE TO ACQUIRED ATROPHY OF THYROID: ICD-10-CM

## 2019-11-18 DIAGNOSIS — I10 ESSENTIAL HYPERTENSION WITH GOAL BLOOD PRESSURE LESS THAN 140/90: ICD-10-CM

## 2019-11-18 DIAGNOSIS — Z98.1 S/P LUMBAR SPINAL ARTHRODESIS: ICD-10-CM

## 2019-11-18 DIAGNOSIS — Z71.6 ENCOUNTER FOR SMOKING CESSATION COUNSELING: ICD-10-CM

## 2019-11-18 DIAGNOSIS — E78.5 HYPERLIPIDEMIA LDL GOAL <100: ICD-10-CM

## 2019-11-18 DIAGNOSIS — Z87.891 HISTORY OF TOBACCO ABUSE: ICD-10-CM

## 2019-11-18 PROBLEM — M54.50 RIGHT-SIDED LOW BACK PAIN WITHOUT SCIATICA, UNSPECIFIED CHRONICITY: Status: RESOLVED | Noted: 2017-03-16 | Resolved: 2019-11-18

## 2019-11-18 PROCEDURE — 99495 TRANSJ CARE MGMT MOD F2F 14D: CPT | Performed by: FAMILY MEDICINE

## 2019-11-18 RX ORDER — LISINOPRIL 5 MG/1
TABLET ORAL
Qty: 90 TABLET | Refills: 1 | Status: SHIPPED | OUTPATIENT
Start: 2019-11-18 | End: 2020-08-11

## 2019-11-18 RX ORDER — LEVOTHYROXINE SODIUM 88 UG/1
88 TABLET ORAL DAILY
Qty: 90 TABLET | Refills: 1 | Status: SHIPPED | OUTPATIENT
Start: 2019-11-18 | End: 2020-08-11

## 2019-11-18 RX ORDER — ATORVASTATIN CALCIUM 40 MG/1
40 TABLET, FILM COATED ORAL DAILY
Qty: 90 TABLET | Refills: 1 | Status: SHIPPED | OUTPATIENT
Start: 2019-11-18 | End: 2020-08-11

## 2019-11-18 RX ORDER — METOPROLOL SUCCINATE 50 MG/1
50 TABLET, EXTENDED RELEASE ORAL DAILY
Qty: 90 TABLET | Refills: 1 | Status: SHIPPED | OUTPATIENT
Start: 2019-11-18 | End: 2020-08-11

## 2019-11-18 RX ORDER — BUPROPION HYDROCHLORIDE 300 MG/1
300 TABLET ORAL EVERY MORNING
Qty: 90 TABLET | Refills: 1 | Status: SHIPPED | OUTPATIENT
Start: 2019-11-18 | End: 2020-08-14

## 2019-11-18 NOTE — ASSESSMENT & PLAN NOTE
TSH   Date Value Ref Range Status   09/10/2019 5.35 (H) 0.40 - 4.00 mU/L Final     Recheck next lab draw

## 2019-11-18 NOTE — PROGRESS NOTES
Subjective     Rubio Gibbons is a 74 year old male who presents to clinic today for the following health issues:    HPI       Hospital Follow-up Visit:    Hospital/Nursing Home/IP Rehab Facility: [facility]  Date of Admission: 10/28/2019  Date of Discharge: 10/30/2019  Reason(s) for Admission: Lumbar Spine fusion            Problems taking medications regularly:  None       Medication changes since discharge: anti inflammatory, pain meds       Problems adhering to non-medication therapy:  None    Summary of hospitalization:  [facility] discharge summary reviewed  Diagnostic Tests/Treatments reviewed.  Follow up needed: none  Other Healthcare Providers Involved in Patient s Care:         None  Update since discharge: improved.     Post Discharge Medication Reconciliation: discharge medications reconciled, continue medications without change.  Plan of care communicated with patient     Coding guidelines for this visit:  Type of Medical   Decision Making Face-to-Face Visit       within 7 Days of discharge Face-to-Face Visit        within 14 days of discharge   Moderate Complexity [id number]   High Complexity [id number]            Hyperlipidemia LDL goal <100 statin  History of tobacco abuse remains off tobacco  Hypothyroidism due to acquired atrophy of thyroid TSH barely off Sept  Essential hypertension with goal blood pressure less than 140/90   BP Readings from Last 1 Encounters:   11/18/19 123/81               Past Medical History:   Diagnosis Date     Abdominal aortic aneurysm (AAA) without rupture (H) 10/13/2016     CAD (coronary artery disease)      Coronary artery disease involving native coronary artery of native heart without angina pectoris 10/14/2019    Per Note. Dr. López OV 2-20-13 - to establish care. He has a history of myocardial infarction back in 1998 @ [facility] . Does not remember any details- and no stent.   Nuclear 3-12-13 LVEF 34% to establish care. He has a history of  myocardial infarction back in 1998, small to moderately sized partially reversible distal anterior anteroseptal and apical defect, small to moderately sized inferio     Diabetes mellitus type 2, uncomplicated (H) 9/22/2016    Diet      WIN (dyspnea on exertion) 11/21/2014     Heart attack (H) 1998     History of myocardial infarction in adulthood 9/22/2016     History of tobacco abuse 9/10/2019     HTN, goal below 140/90 1/28/2014     Hyperlipidemia LDL goal <100 10/31/2010     Hypothyroidism 11/9/2011     Hypothyroidism due to acquired atrophy of thyroid 2/18/2016     Ischemic cardiomyopathy      LBP (low back pain) 11/21/2014     Lumbar radiculopathy 9/10/2019     Microalbuminuria 11/7/2016    X1     Muscle spasm 9/25/2017     Other abnormal glucose 5/8/2014     Panlobular emphysema (H) 9/22/2016     Presbycusis syndrome, bilateral 9/22/2016     Right-sided low back pain without sciatica, unspecified chronicity 3/16/2017     S/P lumbar spinal arthrodesis 9/10/2019     Tobacco use disorder 7/19/2006     Tubular adenoma        Past Surgical History:   Procedure Laterality Date     ANGIOPLASTY  1998     COLONOSCOPY N/A 11/17/2016    Procedure: COLONOSCOPY;  Surgeon: Adam Lantigua MD;  Location:  GI     OPTICAL TRACKING SYSTEM FUSION SPINE POSTERIOR LUMBAR TWO LEVELS N/A 10/28/2019    Procedure: L5 Herrera-Talavera osteotomy and L4-5 bilateral decompressive laminectomy with L5-S1 transforaminal lumbar interbody fusion and  L4-S1 posterior instrumented fusion;  Surgeon: Phillip Beavers MD;  Location:  OR       Family History   Problem Relation Age of Onset     Cardiovascular Father         heart attack     Coronary Artery Disease Father      Cerebrovascular Disease Father      Cardiovascular Brother         heart attack     Cardiovascular Sister         2 sisters - minor cardiac problems- pt unsure of the exact nature       Social History     Tobacco Use     Smoking status: Former Smoker      Packs/day: 0.25     Years: 40.00     Pack years: 10.00     Types: Cigarettes     Smokeless tobacco: Never Used     Tobacco comment: last one was 10/13 (trying to quit)   Substance Use Topics     Alcohol use: No     Frequency: Never     Drinks per session: Patient refused     Binge frequency: Never         Reviewed and updated as needed this visit by Provider         Review of Systems   Back pain controlled with Tylenol radicular symptoms intermittent less frequent less intense no  GI symptoms        Objective    There were no vitals taken for this visit.  There is no height or weight on file to calculate BMI.   /81 (BP Location: Right arm, Patient Position: Chair, Cuff Size: Adult Regular)   Pulse 62   Temp 97.7  F (36.5  C) (Oral)   Resp 18   Wt 77.5 kg (170 lb 12.8 oz)   SpO2 97%   BMI 30.26 kg/m      Physical Exam   Wound is clean and dry eschars at the top.  Steri-Strips are almost off.  Gait without antalgia              Problem List Items Addressed This Visit        Endocrine    HYPERLIPIDEMIA LDL GOAL <100     statin         Relevant Medications    atorvastatin (LIPITOR) 40 MG tablet    Hypothyroidism due to acquired atrophy of thyroid     TSH   Date Value Ref Range Status   09/10/2019 5.35 (H) 0.40 - 4.00 mU/L Final     Recheck next lab draw         Relevant Medications    levothyroxine (SYNTHROID/LEVOTHROID) 88 MCG tablet       Circulatory    Essential hypertension with goal blood pressure less than 140/90     controlled         Relevant Medications    lisinopril (PRINIVIL/ZESTRIL) 5 MG tablet    metoprolol succinate ER (TOPROL-XL) 50 MG 24 hr tablet       Behavioral    History of tobacco abuse     Remains tobacco free         Relevant Medications    buPROPion (WELLBUTRIN XL) 300 MG 24 hr tablet       Other    S/P lumbar spinal arthrodesis     Pain controlled with tylenol, radicular symptoms now less, intermittent. No tub bath for 6 weeks post               Riog Goddard MD

## 2019-11-18 NOTE — ASSESSMENT & PLAN NOTE
Pain controlled with tylenol, radicular symptoms now less, intermittent. No tub bath for 6 weeks post

## 2020-02-08 ENCOUNTER — HEALTH MAINTENANCE LETTER (OUTPATIENT)
Age: 75
End: 2020-02-08

## 2020-03-16 NOTE — PROGRESS NOTES
Talked with pt who is agreeable to a phone visit.  Medications are good.  Takes pain meds prn has some avail if needed.  Torrie Ahmadi RN        Pre-Visit Planning     Future Appointments   Date Time Provider Department Center   3/17/2020  8:00 AM Rigo Goddard MD [facility]     Arrival Time for this Appointment:  7:40 AM   Appointment Notes for this encounter:   4 month follow up

## 2020-03-17 ENCOUNTER — VIRTUAL VISIT (OUTPATIENT)
Dept: FAMILY MEDICINE | Facility: CLINIC | Age: 75
End: 2020-03-17

## 2020-03-17 DIAGNOSIS — D48.5 NEOPLASM OF UNCERTAIN BEHAVIOR OF SKIN: ICD-10-CM

## 2020-03-17 DIAGNOSIS — F17.200 TOBACCO USE DISORDER: ICD-10-CM

## 2020-03-17 DIAGNOSIS — E11.9 TYPE 2 DIABETES MELLITUS WITHOUT COMPLICATION, WITHOUT LONG-TERM CURRENT USE OF INSULIN (H): ICD-10-CM

## 2020-03-17 DIAGNOSIS — I10 ESSENTIAL HYPERTENSION WITH GOAL BLOOD PRESSURE LESS THAN 140/90: ICD-10-CM

## 2020-03-17 DIAGNOSIS — E03.4 HYPOTHYROIDISM DUE TO ACQUIRED ATROPHY OF THYROID: ICD-10-CM

## 2020-03-17 DIAGNOSIS — Z87.891 HISTORY OF TOBACCO ABUSE: ICD-10-CM

## 2020-03-17 DIAGNOSIS — E78.5 HYPERLIPIDEMIA LDL GOAL <100: Primary | ICD-10-CM

## 2020-03-17 PROCEDURE — 99441 ZZC PHYSICIAN TELEPHONE EVALUATION 5-10 MIN: CPT | Performed by: FAMILY MEDICINE

## 2020-03-17 NOTE — PROGRESS NOTES
"Rubio Gibbons is a 74 year old male who is being evaluated via a billable telephone visit.      The patient has been notified of following:     \"This telephone visit will be conducted via a call between you and your physician/provider. We have found that certain health care needs can be provided without the need for a physical exam.  This service lets us provide the care you need with a short phone conversation.  If a prescription is necessary we can send it directly to your pharmacy.  If lab work is needed we can place an order for that and you can then stop by our lab to have the test done at a later time.    If during the course of the call the physician/provider feels a telephone visit is not appropriate, you will not be charged for this service.\"       Rubio Gibbons complains of    Chief Complaint   Patient presents with     Telephone       I have reviewed and updated the patient's Past Medical History, Social History, Family History and Medication List.    ALLERGIES  Chantix [varenicline]    Jenifer MARCELINO   (MA signature)    Additional provider notes:    Diabetes Follow-up      How often are you checking your blood sugar? Not at all    What concerns do you have today about your diabetes? None     Do you have any of these symptoms? (Select all that apply)  No numbness or tingling in feet.  No redness, sores or blisters on feet.  No complaints of excessive thirst.  No reports of blurry vision.  No significant changes to weight.    Have you had a diabetic eye exam in the last 12 months? No          Hyperlipidemia Follow-Up      Are you regularly taking any medication or supplement to lower your cholesterol?   Yes- every morning    Are you having muscle aches or other side effects that you think could be caused by your cholesterol lowering medication?  No    Hypertension Follow-up      Do you check your blood pressure regularly outside of the clinic? No     Are you following a low salt diet? Yes    Are your " blood pressures ever more than 140 on the top number (systolic) OR more   than 90 on the bottom number (diastolic), for example 140/90? No    BP Readings from Last 2 Encounters:   11/18/19 123/81   10/30/19 118/71     Hemoglobin A1C (%)   Date Value   09/10/2019 6.6 (H)   02/01/2019 7.2 (H)     LDL Cholesterol Calculated (mg/dL)   Date Value   09/10/2019 68   07/26/2018 72         Assessment/Plan:  ASSESSMENT / PLAN:  (E78.5) Hyperlipidemia LDL goal <100  (primary encounter diagnosis)  Comment: Continue statin  Plan:     (E03.4) Hypothyroidism due to acquired atrophy of thyroid  Comment:   TSH   Date Value Ref Range Status   09/10/2019 5.35 (H) 0.40 - 4.00 mU/L Final       Plan: Test with next lab draw    (I10) Essential hypertension with goal blood pressure less than 140/90  Comment:   BP Readings from Last 1 Encounters:   11/18/19 123/81       Plan: Continue current regimen        (E11.9) Type 2 diabetes mellitus without complication, without long-term current use of insulin (H)  Comment:   Hemoglobin A1C   Date Value Ref Range Status   09/10/2019 6.6 (H) 0 - 5.6 % Final     Comment:     Normal <5.7% Prediabetes 5.7-6.4%  Diabetes 6.5% or higher - adopted from ADA   consensus guidelines.         Plan: Plan to measure in future in the morning you are taking    (Z87.891) History of tobacco abuse  Comment: He remains tobacco free  Plan: Positive stroke    (D48.5) Neoplasm of uncertain behavior of skin  Comment: He has a worrisome growth on his forehead and his leg.  His wife has an appointment with dermatology he would like to be seen as well  Plan: DERMATOLOGY REFERRAL        refer          Rigo Goddard MD        I have reviewed the note as documented above.  This accurately captures the substance of my conversation with the patient.      Phone call contact time  Call Started at 8:25 A  Call Ended at 8:30 A    Rigo Goddard MD

## 2020-08-13 NOTE — PROGRESS NOTES
Pre-Visit Planning :     Future Appointments   Date Time Provider Department Center   8/14/2020  9:00 AM Phoenix Allen PA-C [facility]      Arrival Time for this Appointment:  8:40 AM      Appointment Notes for this encounter   Med Check     Questionnaires Reviewed/Assigned:   PHQ-2     PCT PVP Questionnaire Review: yes     Last OV with provider:  3/17/2020-hyperlipidemia     Hospital ER Visits:  10/28/2019-[facility]: L5 Herrera-Talavera osteotomy and L4-5 bilateral decompressive laminectomy with L5-S1 transforaminal lumbar interbody fusion and  L4-S1 posterior instrumented fusion      [facility]:  [facility] Dr. Acosta:   Coronary artery disease involving native coronary artery of native heart without angina pectoris   Essential hypertension with goal blood pressure less than 140/90 Hyperlipidemia      Imaging and Lab Review:10/14/2019-[facility] : US Abdominal Aorta Imaging      Recent Procedures:  10/28/2019 L-4-L-5 osteotomy & bilateral decompressive lami w/transforaminal interbody fusion & L-4-S-1 post instrumented fusion    Health Maintenance Due   Topic Date Due     HEPATITIS B IMMUNIZATION (1 of 3 - Risk 3-dose series) 04/04/1964     EYE EXAM  11/01/2017     MEDICARE ANNUAL WELLNESS VISIT  09/25/2018     PHQ-2  01/01/2020     MICROALBUMIN  02/01/2020     A1C  03/10/2020     BMP  09/10/2020     CMP  09/10/2020     LIPID  09/10/2020     TSH W/FREE T4 REFLEX  09/10/2020     DIABETIC FOOT EXAM  09/10/2020     ANNUAL REVIEW OF HM ORDERS  09/10/2020          Current Outpatient Medications   Medication     aspirin (ASPIRIN LOW DOSE) 81 MG EC tablet     atorvastatin (LIPITOR) 40 MG tablet     buPROPion (WELLBUTRIN XL) 300 MG 24 hr tablet     HYDROmorphone (DILAUDID) 2 MG tablet     levothyroxine (SYNTHROID/LEVOTHROID) 88 MCG tablet     lisinopril (ZESTRIL) 5 MG tablet     methocarbamol (ROBAXIN) 500 MG tablet     metoprolol succinate ER (TOPROL-XL) 50 MG 24 hr  tablet     No current facility-administered medications for this visit.         Preferred pharmacy: [place], [place] - [address]     Patient preferred phone number: [phone]    [phone] ([phone])-[phone] left w/reminder of pt's arrival time for appointment tomorrow,8/14/2020 and clinic # if pt lawler any questions or concerns./ANNA, RN    Edilia Jones, Registered Nurse, Patient Advocate Liaison [facility]

## 2020-08-14 ENCOUNTER — OFFICE VISIT (OUTPATIENT)
Dept: FAMILY MEDICINE | Facility: CLINIC | Age: 75
End: 2020-08-14
Payer: COMMERCIAL

## 2020-08-14 VITALS
WEIGHT: 183.38 LBS | HEART RATE: 63 BPM | OXYGEN SATURATION: 94 % | TEMPERATURE: 98.3 F | SYSTOLIC BLOOD PRESSURE: 124 MMHG | BODY MASS INDEX: 32.48 KG/M2 | DIASTOLIC BLOOD PRESSURE: 80 MMHG

## 2020-08-14 DIAGNOSIS — E03.4 HYPOTHYROIDISM DUE TO ACQUIRED ATROPHY OF THYROID: ICD-10-CM

## 2020-08-14 DIAGNOSIS — E78.5 HYPERLIPIDEMIA LDL GOAL <100: ICD-10-CM

## 2020-08-14 DIAGNOSIS — R80.9 MICROALBUMINURIA: ICD-10-CM

## 2020-08-14 DIAGNOSIS — E11.9 TYPE 2 DIABETES MELLITUS WITHOUT COMPLICATION, WITHOUT LONG-TERM CURRENT USE OF INSULIN (H): Primary | ICD-10-CM

## 2020-08-14 LAB
ALBUMIN SERPL-MCNC: 3.5 G/DL (ref 3.4–5)
ALP SERPL-CCNC: 84 U/L (ref 40–150)
ALT SERPL W P-5'-P-CCNC: 17 U/L (ref 0–70)
ANION GAP SERPL CALCULATED.3IONS-SCNC: 7 MMOL/L (ref 3–14)
AST SERPL W P-5'-P-CCNC: 19 U/L (ref 0–45)
BILIRUB SERPL-MCNC: 0.4 MG/DL (ref 0.2–1.3)
BUN SERPL-MCNC: 22 MG/DL (ref 7–30)
CALCIUM SERPL-MCNC: 9.6 MG/DL (ref 8.5–10.1)
CHLORIDE SERPL-SCNC: 104 MMOL/L (ref 94–109)
CHOLEST SERPL-MCNC: 139 MG/DL
CO2 SERPL-SCNC: 26 MMOL/L (ref 20–32)
CREAT SERPL-MCNC: 1.12 MG/DL (ref 0.66–1.25)
GFR SERPL CREATININE-BSD FRML MDRD: 63 ML/MIN/{1.73_M2}
GLUCOSE SERPL-MCNC: 140 MG/DL (ref 70–99)
HBA1C MFR BLD: 7.2 % (ref 0–5.6)
HDLC SERPL-MCNC: 33 MG/DL
LDLC SERPL CALC-MCNC: 67 MG/DL
NONHDLC SERPL-MCNC: 106 MG/DL
POTASSIUM SERPL-SCNC: 5.2 MMOL/L (ref 3.4–5.3)
PROT SERPL-MCNC: 7.5 G/DL (ref 6.8–8.8)
SODIUM SERPL-SCNC: 137 MMOL/L (ref 133–144)
T4 FREE SERPL-MCNC: 1.03 NG/DL (ref 0.76–1.46)
TRIGL SERPL-MCNC: 194 MG/DL
TSH SERPL DL<=0.005 MIU/L-ACNC: 8.46 MU/L (ref 0.4–4)

## 2020-08-14 PROCEDURE — 84443 ASSAY THYROID STIM HORMONE: CPT | Performed by: PHYSICIAN ASSISTANT

## 2020-08-14 PROCEDURE — 83036 HEMOGLOBIN GLYCOSYLATED A1C: CPT | Performed by: PHYSICIAN ASSISTANT

## 2020-08-14 PROCEDURE — 84439 ASSAY OF FREE THYROXINE: CPT | Performed by: PHYSICIAN ASSISTANT

## 2020-08-14 PROCEDURE — 99207 C FOOT EXAM  NO CHARGE: CPT | Mod: 25 | Performed by: PHYSICIAN ASSISTANT

## 2020-08-14 PROCEDURE — 80053 COMPREHEN METABOLIC PANEL: CPT | Performed by: PHYSICIAN ASSISTANT

## 2020-08-14 PROCEDURE — 80061 LIPID PANEL: CPT | Performed by: PHYSICIAN ASSISTANT

## 2020-08-14 PROCEDURE — 36415 COLL VENOUS BLD VENIPUNCTURE: CPT | Performed by: PHYSICIAN ASSISTANT

## 2020-08-14 PROCEDURE — 82043 UR ALBUMIN QUANTITATIVE: CPT | Performed by: PHYSICIAN ASSISTANT

## 2020-08-14 PROCEDURE — 99214 OFFICE O/P EST MOD 30 MIN: CPT | Performed by: PHYSICIAN ASSISTANT

## 2020-08-14 NOTE — PROGRESS NOTES
Subjective     Rubio Gibbons is a 75 year old male who presents to clinic today for the following health issues:    HPI       Diabetes Follow-up      How often are you checking your blood sugar? Not at all    What concerns do you have today about your diabetes? None     Do you have any of these symptoms? (Select all that apply)  No numbness or tingling in feet.  No redness, sores or blisters on feet.  No complaints of excessive thirst.  No reports of blurry vision.  No significant changes to weight.    Have you had a diabetic eye exam in the last 12 months? No        BP Readings from Last 2 Encounters:   08/14/20 124/80   11/18/19 123/81     Hemoglobin A1C (%)   Date Value   09/10/2019 6.6 (H)   02/01/2019 7.2 (H)     LDL Cholesterol Calculated (mg/dL)   Date Value   09/10/2019 68   07/26/2018 72         Hypertension Follow-up      Do you check your blood pressure regularly outside of the clinic? No     Are you following a low salt diet? No    Are your blood pressures ever more than 140 on the top number (systolic) OR more   than 90 on the bottom number (diastolic), for example 140/90? No      Hypothyroidism Follow-up      Since last visit, patient describes the following symptoms: Weight stable, no hair loss, no skin changes, no constipation, no loose stools      Patient Active Problem List   Diagnosis     HYPERLIPIDEMIA LDL GOAL <100     Advanced directives, counseling/discussion     Essential hypertension with goal blood pressure less than 140/90     WIN (dyspnea on exertion)     Hypothyroidism due to acquired atrophy of thyroid     Panlobular emphysema (H)     History of myocardial infarction in adulthood     Presbycusis syndrome, bilateral     Diabetes mellitus type 2, uncomplicated (H)     Abdominal aortic aneurysm (AAA) without rupture (H)     Microalbuminuria     Muscle spasm     Gait disturbance     S/P lumbar spinal arthrodesis     History of tobacco abuse     DDD (degenerative disc disease), lumbar      Coronary artery disease involving native coronary artery of native heart without angina pectoris     S/P cervical spinal fusion     Past Surgical History:   Procedure Laterality Date     ANGIOPLASTY  1998     COLONOSCOPY N/A 11/17/2016    Procedure: COLONOSCOPY;  Surgeon: Adam Lantigua MD;  Location:  GI     OPTICAL TRACKING SYSTEM FUSION SPINE POSTERIOR LUMBAR TWO LEVELS N/A 10/28/2019    Procedure: L5 Herrera-Talavera osteotomy and L4-5 bilateral decompressive laminectomy with L5-S1 transforaminal lumbar interbody fusion and  L4-S1 posterior instrumented fusion;  Surgeon: Phillip Beavers MD;  Location:  OR       Social History     Tobacco Use     Smoking status: Former Smoker     Packs/day: 0.25     Years: 40.00     Pack years: 10.00     Types: Cigarettes     Smokeless tobacco: Never Used     Tobacco comment: last one was 10/13 (trying to quit)   Substance Use Topics     Alcohol use: No     Frequency: Never     Drinks per session: Patient refused     Binge frequency: Never     Family History   Problem Relation Age of Onset     Cardiovascular Father         heart attack     Coronary Artery Disease Father      Cerebrovascular Disease Father      Cardiovascular Brother         heart attack     Cardiovascular Sister         2 sisters - minor cardiac problems- pt unsure of the exact nature         Current Outpatient Medications   Medication Sig Dispense Refill     aspirin (ASPIRIN LOW DOSE) 81 MG EC tablet Take 1 tablet (81 mg) by mouth daily 1 tablet 0     atorvastatin (LIPITOR) 40 MG tablet TAKE ONE TABLET BY MOUTH ONCE DAILY 90 tablet 1     buPROPion (WELLBUTRIN XL) 300 MG 24 hr tablet Take 1 tablet (300 mg) by mouth every morning 90 tablet 1     HYDROmorphone (DILAUDID) 2 MG tablet Take 1-2 tablets (2-4 mg) by mouth every 6 hours as needed 50 tablet 0     levothyroxine (SYNTHROID/LEVOTHROID) 88 MCG tablet TAKE ONE TABLET BY MOUTH ONCE DAILY 90 tablet 0     lisinopril (ZESTRIL) 5 MG tablet TAKE ONE  TABLET BY MOUTH ONCE DAILY 90 tablet 1     methocarbamol (ROBAXIN) 500 MG tablet Take 1 tablet (500 mg) by mouth 4 times daily as needed for muscle spasms 60 tablet 0     metoprolol succinate ER (TOPROL-XL) 50 MG 24 hr tablet TAKE ONE TABLET BY MOUTH ONCE DAILY 90 tablet 1     Allergies   Allergen Reactions     Chantix [Varenicline] GI Disturbance       Reviewed and updated as needed this visit by Provider         Review of Systems   Constitutional, HEENT, cardiovascular, pulmonary, GI, , musculoskeletal, neuro, skin, endocrine and psych systems are negative, except as otherwise noted.          Objective    /80 (BP Location: Right arm, Patient Position: Chair, Cuff Size: Adult Regular)   Pulse 63   Temp 98.3  F (36.8  C) (Oral)   Wt 83.2 kg (183 lb 6 oz)   SpO2 94%   BMI 32.48 kg/m    Body mass index is 32.48 kg/m .         Physical Exam   GENERAL: healthy, alert and no distress  EYES: Eyes grossly normal to inspection, PERRL and conjunctivae and sclerae normal  NECK: no adenopathy, no asymmetry, masses, or scars and thyroid normal to palpation  RESP: lungs clear to auscultation - no rales, rhonchi or wheezes  CV: regular rate and rhythm, normal S1 S2, no S3 or S4, no murmur, click or rub, no peripheral edema and peripheral pulses strong  ABDOMEN: soft, nontender, no hepatosplenomegaly, no masses and bowel sounds normal  MS: no gross musculoskeletal defects noted, no edema  SKIN: no suspicious lesions or rashes  NEURO: Normal strength and tone, mentation intact and speech normal  PSYCH: mentation appears normal, affect normal/bright  Diabetic foot exam: normal DP and PT pulses, no trophic changes or ulcerative lesions and normal sensory exam    Diagnostic Test Results:  none         Assessment & Plan     (E11.9) Type 2 diabetes mellitus without complication, without long-term current use of insulin (H)  (primary encounter diagnosis)    Comment: Due for labs. Diet controlled at this time. Stable as far  as he knows but he doesn't check blood sugars.    Plan: HEMOGLOBIN A1C, COMPREHENSIVE METABOLIC PANEL,         Lipid panel reflex to direct LDL Fasting, C         FOOT EXAM  NO CHARGE            (E78.5) Hyperlipidemia LDL goal <100    Comment: Re-check labs.    Plan: COMPREHENSIVE METABOLIC PANEL, Lipid panel         reflex to direct LDL Fasting            (E03.4) Hypothyroidism due to acquired atrophy of thyroid    Comment: Re-check labs. He has 90 day supply. If stable, can request refill in 3 months. Due for visit in 6 months.    Plan: TSH WITH FREE T4 REFLEX            (R80.9) Microalbuminuria    Comment: Re-check labs.    Plan: Albumin Random Urine Quantitative with Creat         Ratio                There are no Patient Instructions on file for this visit.    Return in about 6 months (around 2/14/2021) for Routine Visit.    Phoenix Allen PA-C  [facility]

## 2020-08-15 LAB
CREAT UR-MCNC: 79 MG/DL
MICROALBUMIN UR-MCNC: 9 MG/L
MICROALBUMIN/CREAT UR: 11 MG/G CR (ref 0–17)

## 2020-11-05 NOTE — PROGRESS NOTES
Yes, you have gallstones. The aorta is stable. I would recommend we have you talk to the surgeons. I have taken the liberty of contacting them: they will call you  ROLAND BRAXTON  
yes

## 2020-11-09 DIAGNOSIS — E03.4 HYPOTHYROIDISM DUE TO ACQUIRED ATROPHY OF THYROID: ICD-10-CM

## 2020-11-10 RX ORDER — LEVOTHYROXINE SODIUM 88 UG/1
TABLET ORAL
Qty: 90 TABLET | Refills: 1 | Status: SHIPPED | OUTPATIENT
Start: 2020-11-10 | End: 2021-05-11

## 2020-11-10 NOTE — TELEPHONE ENCOUNTER
Routing refill request to provider for review/approval because:  Labs out of range:  TSH    Monique Brown RN

## 2021-02-08 DIAGNOSIS — E78.5 HYPERLIPIDEMIA LDL GOAL <100: ICD-10-CM

## 2021-02-08 DIAGNOSIS — I10 ESSENTIAL HYPERTENSION WITH GOAL BLOOD PRESSURE LESS THAN 140/90: ICD-10-CM

## 2021-02-09 RX ORDER — METOPROLOL SUCCINATE 50 MG/1
TABLET, EXTENDED RELEASE ORAL
Qty: 90 TABLET | Refills: 0 | Status: SHIPPED | OUTPATIENT
Start: 2021-02-09 | End: 2021-02-25

## 2021-02-09 RX ORDER — ATORVASTATIN CALCIUM 40 MG/1
TABLET, FILM COATED ORAL
Qty: 90 TABLET | Refills: 0 | Status: SHIPPED | OUTPATIENT
Start: 2021-02-09 | End: 2021-02-25

## 2021-02-09 RX ORDER — LISINOPRIL 5 MG/1
TABLET ORAL
Qty: 90 TABLET | Refills: 0 | Status: SHIPPED | OUTPATIENT
Start: 2021-02-09 | End: 2021-02-25

## 2021-02-09 NOTE — TELEPHONE ENCOUNTER
Medication is being filled for 1 time refill only due to:  Patient needs to be seen because it has been more than one year since last visit.  Routed to  for scheduling  Prescription approved per [facility] Refill Protocol.  Yun Washington RN, BSN  Message handled by CLINIC NURSE.

## 2021-02-23 NOTE — PROGRESS NOTES
Pre-Visit Planning :     Future Appointments   Date Time Provider Department Center   2/25/2021  9:10 AM Rigo Goddard MD [facility]      Arrival Time for this Appointment:  8:50 AM      Appointment Notes for this encounter:    Med check and back pain     Questionnaires Reviewed/Assigned:   PHQ-2    Are there any additional questions or concerns you'd like to review with your provider during your visit? N/A      Last OV with provider:  3/17/2020; Hyperlipidemia LDL goal <100;  Hypothyroidism due to acquired atrophy of thyroid;  Essential hypertension with goal blood pressure less than 140/90;  Tobacco use disorder;  Type 2 diabetes mellitus without complication, without long-term current use of insulin (H);  History of tobacco abuse;  Neoplasm of uncertain behavior of skin    Hospital ER Visits:  N/A    Is the visit preventive? No        Specialty Visits:  10/16/2019; [facility]; Maci Acosta MD-Cardiovascular Disease;  Coronary artery disease involving native coronary artery of native heart without angina pectoris;  Essential hypertension        with goal blood pressure less than 140/90;  Hyperlipidemia LDL goal <100    Imaging and Lab Review:  8/14/2020;   Hbg A1C- 7.2 % High  ; Trig- 194 High ; HDL-33 Low        Chol-139, LDL-67     Recent Procedures:  10/28/2019; Phillip Beavers MD, MS, [facility]-Neurosurgery; PROCEDURE: L5 Smith-Talavera osteotomy and L4-5 bilateral decompressive laminectomy with L5-S1 transforaminal lumbar interbody fusion and  L4-S1 posterior instrumented fusion  1. L5 Herrera-Talavera (Gavino) osteotomy with complete facetectomy and interpedicular decompression with exposure of the L5 and S1 roots bilaterally  2.  L4-5 bilateral laminectomy, medial facetectomy and foraminotomies with decompression of the L4 exiting in the L5 traversing roots. 3.  L5-S1 complete discectomy with arthrodesis and placement of a 8 x 26 mm Nuvasive TLX expandable interbody graft with  "locally harvested autologous bone placed centrally and anteriorl4. Placement of L4-S1 Nuvasive Reline Traction pedicle screws bilaterally from L4 to S 1  5. L4 - S1 posterior lateral fusion with placement of Medtronic Infuse and locally harvested autologous bone  6. Open reduction and internal fixation of L5-S1 unstable grade 2 spondylolisthesis  7. Use of Stealth and O-arm intraoperative neuro navigation 8. Use of portable X-ray and intraoperative microscope ;     PREOPERATIVE DIAGNOSIS:   1.  Degenerative lumbar scoliosis with a 28 degree right convexity curve  2.  Grade 2 unstable L5-S1 spondylolisthesis with bilateral pars defects and severe foraminal stenosis  3.  L4-5 degenerative disc disease with mild foraminal stenosis right greater than left  4.  Low back pain  5.  Right lower extremity radiculopathy  MEDS ;    Current Outpatient Medications   Medication     aspirin (ASPIRIN LOW DOSE) 81 MG EC tablet     atorvastatin (LIPITOR) 40 MG tablet     levothyroxine (SYNTHROID/LEVOTHROID) 88 MCG tablet     lisinopril (ZESTRIL) 5 MG tablet     metoprolol succinate ER (TOPROL-XL) 50 MG 24 hr tablet     No current facility-administered medications for this visit.       Is there anything on your medication list that needs to be updated?  Ask pt @ check-in     Health Maintenance Due   Topic Date Due     COVID-19 Vaccine (1 of 2) 04/04/1961     HEPATITIS C SCREENING  04/04/1962     EYE EXAM  11/01/2017     MEDICARE ANNUAL WELLNESS VISIT  09/25/2018     FALL RISK ASSESSMENT  10/24/2020     PHQ-2  01/01/2021     A1C  02/14/2021     Preferred pharmacy: [place], [place] - [id number] [address]:  YES    Questionnaire Review :  Lastly, it appears there are some questions your care team has for you.    If MyChart ACTIVE \"If you get a chance to do your questions on Aqdothart, your appointment will be much faster and smoother so feel free to sign in and go through those, otherwise please plan on " arriving early so that you have time to complete them.     Patient preferred phone number: [phone]    Edilia Jones, Registered Nurse, [facility]   [phone]

## 2021-02-25 ENCOUNTER — OFFICE VISIT (OUTPATIENT)
Dept: FAMILY MEDICINE | Facility: CLINIC | Age: 76
End: 2021-02-25
Payer: COMMERCIAL

## 2021-02-25 VITALS
TEMPERATURE: 97.8 F | OXYGEN SATURATION: 97 % | DIASTOLIC BLOOD PRESSURE: 72 MMHG | HEIGHT: 66 IN | WEIGHT: 184.9 LBS | BODY MASS INDEX: 29.72 KG/M2 | HEART RATE: 56 BPM | SYSTOLIC BLOOD PRESSURE: 140 MMHG

## 2021-02-25 DIAGNOSIS — E11.9 TYPE 2 DIABETES MELLITUS WITHOUT COMPLICATION, WITHOUT LONG-TERM CURRENT USE OF INSULIN (H): Primary | ICD-10-CM

## 2021-02-25 DIAGNOSIS — E78.5 HYPERLIPIDEMIA LDL GOAL <100: ICD-10-CM

## 2021-02-25 DIAGNOSIS — J43.1 PANLOBULAR EMPHYSEMA (H): ICD-10-CM

## 2021-02-25 DIAGNOSIS — M54.41 MIDLINE LOW BACK PAIN WITH RIGHT-SIDED SCIATICA, UNSPECIFIED CHRONICITY: ICD-10-CM

## 2021-02-25 DIAGNOSIS — I71.40 ABDOMINAL AORTIC ANEURYSM (AAA) WITHOUT RUPTURE (H): ICD-10-CM

## 2021-02-25 DIAGNOSIS — Z11.59 NEED FOR HEPATITIS C SCREENING TEST: ICD-10-CM

## 2021-02-25 DIAGNOSIS — E03.4 HYPOTHYROIDISM DUE TO ACQUIRED ATROPHY OF THYROID: ICD-10-CM

## 2021-02-25 DIAGNOSIS — E66.01 MORBID OBESITY (H): ICD-10-CM

## 2021-02-25 DIAGNOSIS — I10 ESSENTIAL HYPERTENSION WITH GOAL BLOOD PRESSURE LESS THAN 140/90: ICD-10-CM

## 2021-02-25 DIAGNOSIS — Z28.21 COVID-19 VIRUS VACCINATION DECLINED: ICD-10-CM

## 2021-02-25 DIAGNOSIS — L82.0 INFLAMED SEBORRHEIC KERATOSIS: ICD-10-CM

## 2021-02-25 LAB
HBA1C MFR BLD: 7.6 % (ref 0–5.6)
HCV AB SERPL QL IA: NONREACTIVE
T4 FREE SERPL-MCNC: 1 NG/DL (ref 0.76–1.46)
TSH SERPL DL<=0.005 MIU/L-ACNC: 6.52 MU/L (ref 0.4–4)

## 2021-02-25 PROCEDURE — 17110 DESTRUCTION B9 LES UP TO 14: CPT | Performed by: FAMILY MEDICINE

## 2021-02-25 PROCEDURE — 84439 ASSAY OF FREE THYROXINE: CPT | Performed by: FAMILY MEDICINE

## 2021-02-25 PROCEDURE — 83036 HEMOGLOBIN GLYCOSYLATED A1C: CPT | Performed by: FAMILY MEDICINE

## 2021-02-25 PROCEDURE — 99214 OFFICE O/P EST MOD 30 MIN: CPT | Mod: 25 | Performed by: FAMILY MEDICINE

## 2021-02-25 PROCEDURE — 84443 ASSAY THYROID STIM HORMONE: CPT | Performed by: FAMILY MEDICINE

## 2021-02-25 PROCEDURE — 86803 HEPATITIS C AB TEST: CPT | Performed by: FAMILY MEDICINE

## 2021-02-25 PROCEDURE — 36415 COLL VENOUS BLD VENIPUNCTURE: CPT | Performed by: FAMILY MEDICINE

## 2021-02-25 RX ORDER — ATORVASTATIN CALCIUM 40 MG/1
40 TABLET, FILM COATED ORAL DAILY
Qty: 90 TABLET | Refills: 3 | Status: SHIPPED | OUTPATIENT
Start: 2021-02-25 | End: 2022-03-16

## 2021-02-25 RX ORDER — LISINOPRIL 10 MG/1
10 TABLET ORAL DAILY
Qty: 90 TABLET | Refills: 1 | Status: SHIPPED | OUTPATIENT
Start: 2021-02-25 | End: 2021-08-12

## 2021-02-25 RX ORDER — METOPROLOL SUCCINATE 50 MG/1
50 TABLET, EXTENDED RELEASE ORAL DAILY
Qty: 90 TABLET | Refills: 1 | Status: SHIPPED | OUTPATIENT
Start: 2021-02-25 | End: 2021-11-11

## 2021-02-25 ASSESSMENT — MIFFLIN-ST. JEOR: SCORE: 1503.51

## 2021-02-25 NOTE — PROGRESS NOTES
Assessment & Plan   Problem List Items Addressed This Visit     Abdominal aortic aneurysm (AAA) without rupture (H)     Yearly ultrasound         Relevant Orders    US Abdominal Aorta Imaging    COVID-19 virus vaccination declined     He prefers waiting until his spouse (73) is eligible. Discussed reduction of household virus exposure through his vaccination         Diabetes mellitus type 2, uncomplicated (H) - Primary     Dietary control.  Weight up.  Measure         Relevant Orders    HEMOGLOBIN A1C (Completed)    Essential hypertension with goal blood pressure less than 140/90     Borderline control.  Increase lisinopril dose         Relevant Medications    lisinopril (ZESTRIL) 10 MG tablet    metoprolol succinate ER (TOPROL-XL) 50 MG 24 hr tablet    HYPERLIPIDEMIA LDL GOAL <100     Statin therapy.  Continue         Relevant Medications    atorvastatin (LIPITOR) 40 MG tablet    Hypothyroidism due to acquired atrophy of thyroid     Yearly TSH due         Relevant Orders    TSH with free T4 reflex (Completed)    Inflamed seborrheic keratosis     Multiple.  On his back they itch.  Discussed options natural history.  Cryotherapy applied         Relevant Orders    DESTRUCT BENIGN LESION, UP TO 14 (Completed)    Midline low back pain with right-sided sciatica     Status post operative intervention, he is having midline back pain persistently with occasional radiculopathy  to his foot on the right.  He goes to the gym regularly.  He is not interested in physical therapy.  Chronic pain therapies introduced.  He is not interested in pursuing additional surgery.  Trial of topical Voltaren gel          Morbid obesity (H)     Diabetes, degenerative back         Need for hepatitis C screening test     Routine         Relevant Orders    Hepatitis C Screen Reflex to HCV RNA Quant and Genotype (Completed)    Panlobular emphysema (H)     He remains smoke-free                         BMI:   Estimated body mass index is 30.3  "kg/m  as calculated from the following:    Height as of this encounter: 1.664 m (5' 5.5\").    Weight as of this encounter: 83.9 kg (184 lb 14.4 oz).   He proposes to redouble his exercise efforts.  He is now obese        Return in about 6 months (around 8/25/2021) for wellness, virtual visit, Lab Work.  Assuming good diabetic control.  We can monitor his blood pressure at that time    Rigo Goddard MD  [facility] FLOYD Bergeron is a 75 year old who presents for the following health issues     HPI       Medication Followup of : Lipitor, Zestril, Toporol-XL, Levothyroxine    Taking Medication as prescribed: yes    Side Effects:  None    Medication Helping Symptoms:  yes     Back Pain  Onset/Duration: Operation done 10/28/2019  Description:   Location of pain: low back right  Character of pain: sharp and dull ache  Pain radiation: radiates into the right buttocks and radiates into the right leg  New numbness or weakness in legs, not attributed to pain: no   Intensity: Currently 1/10, At its worst 4/10  Progression of Symptoms: worsening  History:   Specific cause: none  Pain interferes with job: not applicable  History of back problems: no prior back problems  Any previous MRI or X-rays: Yes- at [place].  Date 2019  Sees a specialist for back pain: No  Alleviating factors:   Improved by: being non active    Precipitating factors:  Worsened by: none  Therapies tried and outcome: Gym/biking 10-15 miles day    Accompanying Signs & Symptoms:  Risk of Fracture: None  Risk of Cauda Equina: None  Risk of Infection: None  Risk of Cancer: None  Risk of Ankylosing Spondylitis: Onset at age <35, male, AND morning back stiffness  no             Review of Systems   No GI no  complaints.  Keratoses on his back itch      Objective    BP (!) 140/72 (BP Location: Left arm, Patient Position: Sitting, Cuff Size: Adult Large)   Pulse 56   Temp 97.8  F (36.6  C) (Oral)   Ht 1.664 m (5' 5.5\")   Wt " 83.9 kg (184 lb 14.4 oz)   SpO2 97%   BMI 30.30 kg/m    Body mass index is 30.3 kg/m .  Physical Exam   Presbycusis evident  Typical seborrheic keratoses on his back with keratin plugs    Numerous seborrheic keratoses on scalp temples and back.  Negative straight leg raise  He would like therapy for his seborrheic keratoses on his back.        Cryotherapy applied  - Care discussed  Rigo Goddard MD

## 2021-03-01 NOTE — RESULT ENCOUNTER NOTE
Thyroid is improved, diabetes is satisfactory.  We should check again in 6 months  Rigo Goddard MD

## 2021-03-25 ENCOUNTER — HOSPITAL ENCOUNTER (OUTPATIENT)
Dept: ULTRASOUND IMAGING | Facility: CLINIC | Age: 76
Discharge: HOME | End: 2021-03-25
Attending: FAMILY MEDICINE | Admitting: FAMILY MEDICINE
Payer: COMMERCIAL

## 2021-03-25 ENCOUNTER — TELEPHONE (OUTPATIENT)
Dept: OTHER | Facility: CLINIC | Age: 76
End: 2021-03-25

## 2021-03-25 DIAGNOSIS — I71.40 ABDOMINAL AORTIC ANEURYSM (AAA) WITHOUT RUPTURE (H): ICD-10-CM

## 2021-03-25 DIAGNOSIS — I71.40 ABDOMINAL AORTIC ANEURYSM (AAA) WITHOUT RUPTURE (H): Primary | ICD-10-CM

## 2021-03-25 PROCEDURE — 76775 US EXAM ABDO BACK WALL LIM: CPT

## 2021-03-25 NOTE — TELEPHONE ENCOUNTER
[place] location.  Pt referred to [facility] by Cherelle Fuentes APRN CNP (Dr. Goddard office) for AAA 4.6cm without rupture.     Pt needs to be scheduled for new pt in person consult with vascular surgery.  Will route to scheduling to coordinate an appointment at next nearest available.     Appt note: New pt ref by Cherelle Fuentes CNP for 4.6cm AAA (US AAA 3/25/21 in Epic).     CARLOS ALBERTO DillonN, RN  [facility] [place]

## 2021-03-26 ENCOUNTER — TELEPHONE (OUTPATIENT)
Dept: FAMILY MEDICINE | Facility: CLINIC | Age: 76
End: 2021-03-26

## 2021-03-26 NOTE — TELEPHONE ENCOUNTER
Scheduled for 4/14/2021 deonte/DAVID in [place].     [facility] is aware of location.     Bianca JONES

## 2021-03-26 NOTE — TELEPHONE ENCOUNTER
Pt contacted regarding referral for vascular clinic. Clinic number verified with Pt and he denied any questions or concerns.         Melissa Monge  A-EMT  Clinic Health Guide, SB 4 PAL

## 2021-03-27 ENCOUNTER — HEALTH MAINTENANCE LETTER (OUTPATIENT)
Age: 76
End: 2021-03-27

## 2021-04-14 ENCOUNTER — OFFICE VISIT (OUTPATIENT)
Dept: SURGERY | Facility: CLINIC | Age: 76
End: 2021-04-14
Attending: NURSE PRACTITIONER
Payer: COMMERCIAL

## 2021-04-14 VITALS
WEIGHT: 184 LBS | BODY MASS INDEX: 30.66 KG/M2 | HEIGHT: 65 IN | DIASTOLIC BLOOD PRESSURE: 60 MMHG | OXYGEN SATURATION: 98 % | HEART RATE: 63 BPM | SYSTOLIC BLOOD PRESSURE: 130 MMHG | RESPIRATION RATE: 16 BRPM

## 2021-04-14 DIAGNOSIS — I71.40 ABDOMINAL AORTIC ANEURYSM (AAA) >39 MM DIAMETER (H): Primary | ICD-10-CM

## 2021-04-14 DIAGNOSIS — I71.40 ABDOMINAL AORTIC ANEURYSM (AAA) WITHOUT RUPTURE (H): ICD-10-CM

## 2021-04-14 PROCEDURE — 99203 OFFICE O/P NEW LOW 30 MIN: CPT | Performed by: SURGERY

## 2021-04-14 ASSESSMENT — MIFFLIN-ST. JEOR: SCORE: 1486.5

## 2021-04-14 NOTE — PROGRESS NOTES
[facility] VASCULAR [facility] INITIAL VASCULAR SURGERY CONSULT    Impression:   1.  Slowly enlarging 4.9 cm infrarenal abdominal aortic aneurysm not yet in need of repair.  This aneurysm was noted to be 4.4 cm in diameter in 2016.    Plan:   I had a lengthy discussion with Rubio and his wife regarding AAAs.  We discussed the rationale for repair.  They understand that we would consider repair if AAA diameter ever approaches 5.5 cm.  We briefly discussed endovascular versus open repair.  He will continue his medical regimen.  Vascular surgical follow-up will be with me in 1 year for a repeat AAA ultrasound.    HPI:   Rubio Gibbons is an active 76-year-old gentleman who has been followed for the past 5 years for a slowly enlarging AAA.  He has no known familial history of aneurysms.  He is a former light smoker for many years.  He quit smoking in 2019.  He retired as a  at the [employer] 3 years ago.  He remains physically active.  At today's visit he had no complaints or concerns.       CURRENT MEDICATIONS  aspirin (ASPIRIN LOW DOSE) 81 MG EC tablet, Take 1 tablet (81 mg) by mouth daily  atorvastatin (LIPITOR) 40 MG tablet, Take 1 tablet (40 mg) by mouth daily  levothyroxine (SYNTHROID/LEVOTHROID) 88 MCG tablet, TAKE ONE TABLET BY MOUTH ONCE DAILY  lisinopril (ZESTRIL) 10 MG tablet, Take 1 tablet (10 mg) by mouth daily  metoprolol succinate ER (TOPROL-XL) 50 MG 24 hr tablet, Take 1 tablet (50 mg) by mouth daily    No current facility-administered medications on file prior to visit.         PAST MEDICAL HISTORY  Past Medical History:   Diagnosis Date     Abdominal aortic aneurysm (AAA) without rupture (H) 10/13/2016     CAD (coronary artery disease)      Coronary artery disease involving native coronary artery of native heart without angina pectoris 10/14/2019    Per Note. Dr. López OV 2-20-13 - to establish care. He has a history of myocardial infarction back in 1998 @ [facility] .  Does not remember any details- and no stent.   Nuclear 3-12-13 LVEF 34% to establish care. He has a history of myocardial infarction back in 1998, small to moderately sized partially reversible distal anterior anteroseptal and apical defect, small to moderately sized inferio     COVID-19 virus vaccination declined 2/25/2021     Diabetes mellitus type 2, uncomplicated (H) 9/22/2016    Diet      WIN (dyspnea on exertion) 11/21/2014     Heart attack (H) 1998     History of myocardial infarction in adulthood 9/22/2016     History of tobacco abuse 9/10/2019     HTN, goal below 140/90 1/28/2014     Hyperlipidemia LDL goal <100 10/31/2010     Hypothyroidism 11/9/2011     Hypothyroidism due to acquired atrophy of thyroid 2/18/2016     Ischemic cardiomyopathy      LBP (low back pain) 11/21/2014     Lumbar radiculopathy 9/10/2019     Microalbuminuria 11/7/2016    X1     Midline low back pain with right-sided sciatica 2/25/2021     Morbid obesity (H) 2/25/2021     Muscle spasm 9/25/2017     Other abnormal glucose 5/8/2014     Panlobular emphysema (H) 9/22/2016     Presbycusis syndrome, bilateral 9/22/2016     Right-sided low back pain without sciatica, unspecified chronicity 3/16/2017     S/P lumbar spinal arthrodesis 9/10/2019     Tobacco use disorder 7/19/2006     Tubular adenoma          PAST SURGICAL HISTORY:  Past Surgical History:   Procedure Laterality Date     ANGIOPLASTY  1998     COLONOSCOPY N/A 11/17/2016    Procedure: COLONOSCOPY;  Surgeon: Adam Lantigua MD;  Location:  GI     OPTICAL TRACKING SYSTEM FUSION SPINE POSTERIOR LUMBAR TWO LEVELS N/A 10/28/2019    Procedure: L5 Herrera-Talavera osteotomy and L4-5 bilateral decompressive laminectomy with L5-S1 transforaminal lumbar interbody fusion and  L4-S1 posterior instrumented fusion;  Surgeon: Phillip Beavers MD;  Location:  OR       ALLERGIES     Allergies   Allergen Reactions     Chantix [Varenicline] GI Disturbance       FAMILY HISTORY  Family  "History   Problem Relation Age of Onset     Cardiovascular Father         heart attack     Coronary Artery Disease Father      Cerebrovascular Disease Father      Cardiovascular Brother         heart attack     Cardiovascular Sister         2 sisters - minor cardiac problems- pt unsure of the exact nature       SOCIAL HISTORY  Social History     Tobacco Use     Smoking status: Former Smoker     Packs/day: 0.25     Years: 40.00     Pack years: 10.00     Types: Cigarettes     Smokeless tobacco: Never Used     Tobacco comment: last one was 10/13 (trying to quit)   Substance Use Topics     Alcohol use: No     Frequency: Never     Drinks per session: Patient refused     Binge frequency: Never     Drug use: No       ROS:   Review of Systems   Constitution: Negative.   HENT: Negative.    Eyes: Negative.    Cardiovascular: Negative.    Respiratory: Positive for cough and shortness of breath.    Endocrine: Negative.    Hematologic/Lymphatic: Negative.    Skin: Negative.    Musculoskeletal: Negative.    Gastrointestinal: Negative.    Genitourinary: Negative.    Neurological: Negative.    Psychiatric/Behavioral: Negative.          EXAM:  /60   Pulse 63   Resp 16   Ht 5' 5\" (1.651 m)   Wt 184 lb (83.5 kg)   SpO2 98%   BMI 30.62 kg/m    Physical Exam  Vitals signs reviewed.   Constitutional:       Appearance: Normal appearance. He is obese.   HENT:      Head: Normocephalic and atraumatic.   Eyes:      General: No scleral icterus.     Extraocular Movements: Extraocular movements intact.      Pupils: Pupils are equal, round, and reactive to light.   Neck:      Musculoskeletal: Normal range of motion.   Cardiovascular:      Rate and Rhythm: Normal rate and regular rhythm.      Pulses:           Femoral pulses are 2+ on the right side and 2+ on the left side.       Popliteal pulses are 1+ on the right side and 1+ on the left side.        Dorsalis pedis pulses are 2+ on the right side and 2+ on the left side.        " Posterior tibial pulses are detected w/ Doppler on the right side and 1+ on the left side.   Abdominal:      Palpations: Abdomen is soft.      Tenderness: There is no abdominal tenderness.      Comments: Unable to appreciate AAA due to body habitus.   Musculoskeletal: Normal range of motion.   Skin:     General: Skin is warm and dry.   Neurological:      General: No focal deficit present.      Mental Status: He is alert and oriented to person, place, and time. Mental status is at baseline.   Psychiatric:         Mood and Affect: Mood normal.         Behavior: Behavior normal.         Thought Content: Thought content normal.         Judgment: Judgment normal.         Labs:  LIPID RESULTS:  Lab Results   Component Value Date    CHOL 139 08/14/2020    HDL 33 (L) 08/14/2020    LDL 67 08/14/2020    TRIG 194 (H) 08/14/2020    CHOLHDLRATIO 3.5 11/21/2014       CBC RESULTS:  Lab Results   Component Value Date    WBC 7.5 10/10/2019    RBC 4.59 10/10/2019    HGB 14.7 10/10/2019    HCT 44.8 10/10/2019    MCV 98 10/10/2019    MCH 32.0 10/10/2019    MCHC 32.8 10/10/2019    RDW 13.9 10/10/2019     10/10/2019       BMP RESULTS:  Lab Results   Component Value Date     08/14/2020    POTASSIUM 5.2 08/14/2020    CHLORIDE 104 08/14/2020    CO2 26 08/14/2020    ANIONGAP 7 08/14/2020     (H) 08/14/2020    BUN 22 08/14/2020    CR 1.12 08/14/2020    GFRESTIMATED 63 08/14/2020    GFRESTBLACK 73 08/14/2020    LAKSHMI 9.6 08/14/2020        A1C RESULTS:  Lab Results   Component Value Date    A1C 7.6 (H) 02/25/2021         Imaging:  I reviewed an abdominal aortic ultrasound from 3/25/2021.  I also reviewed multiple abdominal aortic aneurysms over the past 5 years.      Total length of this encounter was 30 minutes.      Ayuhs Mays MD

## 2021-04-15 ASSESSMENT — ENCOUNTER SYMPTOMS
HEMATOLOGIC/LYMPHATIC NEGATIVE: 1
COUGH: 1
SHORTNESS OF BREATH: 1
NEUROLOGICAL NEGATIVE: 1
EYES NEGATIVE: 1
PSYCHIATRIC NEGATIVE: 1
ENDOCRINE NEGATIVE: 1
CONSTITUTIONAL NEGATIVE: 1
MUSCULOSKELETAL NEGATIVE: 1
CARDIOVASCULAR NEGATIVE: 1
GASTROINTESTINAL NEGATIVE: 1

## 2021-04-27 DIAGNOSIS — I71.40 ABDOMINAL AORTIC ANEURYSM (AAA) WITHOUT RUPTURE (H): Primary | ICD-10-CM

## 2021-05-04 NOTE — ASSESSMENT & PLAN NOTE
Borderline control.  Increase lisinopril dose  
Diabetes, degenerative back  
Dietary control.  Weight up.  Measure  
He prefers waiting until his spouse (73) is eligible. Discussed reduction of household virus exposure through his vaccination  
He remains smoke-free  
Multiple.  On his back they itch.  Discussed options natural history.  Cryotherapy applied  
Routine  
Statin therapy.  Continue  
Status post operative intervention, he is having midline back pain persistently with occasional radiculopathy  to his foot on the right.  He goes to the gym regularly.  He is not interested in physical therapy.  Chronic pain therapies introduced.  He is not interested in pursuing additional surgery.  Trial of topical Voltaren gel   
Yearly TSH due  
Yearly ultrasound  
5

## 2021-05-11 DIAGNOSIS — E03.4 HYPOTHYROIDISM DUE TO ACQUIRED ATROPHY OF THYROID: ICD-10-CM

## 2021-05-11 RX ORDER — LEVOTHYROXINE SODIUM 88 UG/1
TABLET ORAL
Qty: 90 TABLET | Refills: 1 | Status: SHIPPED | OUTPATIENT
Start: 2021-05-11 | End: 2021-11-11

## 2021-05-11 NOTE — TELEPHONE ENCOUNTER
Routing refill request to provider for review/approval because:  Labs out of range:    TSH   Date Value Ref Range Status   02/25/2021 6.52 (H) 0.40 - 4.00 mU/L Final     Sarita Yu RN, BSN

## 2021-08-10 DIAGNOSIS — I10 ESSENTIAL HYPERTENSION WITH GOAL BLOOD PRESSURE LESS THAN 140/90: ICD-10-CM

## 2021-08-12 RX ORDER — LISINOPRIL 10 MG/1
TABLET ORAL
Qty: 90 TABLET | Refills: 1 | Status: SHIPPED | OUTPATIENT
Start: 2021-08-12 | End: 2022-02-10

## 2021-08-24 ENCOUNTER — OFFICE VISIT (OUTPATIENT)
Dept: URGENT CARE | Facility: URGENT CARE | Age: 76
End: 2021-08-24
Payer: COMMERCIAL

## 2021-08-24 VITALS
DIASTOLIC BLOOD PRESSURE: 73 MMHG | TEMPERATURE: 98.8 F | HEART RATE: 63 BPM | SYSTOLIC BLOOD PRESSURE: 148 MMHG | RESPIRATION RATE: 20 BRPM | OXYGEN SATURATION: 94 %

## 2021-08-24 DIAGNOSIS — H60.502 ACUTE OTITIS EXTERNA OF LEFT EAR, UNSPECIFIED TYPE: Primary | ICD-10-CM

## 2021-08-24 PROCEDURE — 99213 OFFICE O/P EST LOW 20 MIN: CPT | Performed by: PHYSICIAN ASSISTANT

## 2021-08-24 RX ORDER — NEOMYCIN SULFATE, POLYMYXIN B SULFATE, HYDROCORTISONE 3.5; 10000; 1 MG/ML; [USP'U]/ML; MG/ML
3 SOLUTION/ DROPS AURICULAR (OTIC) 3 TIMES DAILY
Qty: 5 ML | Refills: 0 | Status: SHIPPED | OUTPATIENT
Start: 2021-08-24 | End: 2021-08-31

## 2021-08-24 NOTE — PROGRESS NOTES
Assessment & Plan     Acute otitis externa of left ear, unspecified type  Symptoms and exam suggest.  Cortisporin eardrops are prescribed.  Keep monitoring symptoms.  Follow-up if any worsening symptoms.  He agrees with the plan.  - neomycin-polymyxin-hydrocortisone (CORTISPORIN) 3.5-58537-0 otic solution  Dispense: 5 mL; Refill: 0         Return in about 1 week (around 8/31/2021) for Symptoms failing to improve.    Africa Garcia PA-C  [facility] [place] is [place] 76 year old male who presents to clinic today for the following health issues:  Chief Complaint   Patient presents with     Urgent Care     Ear Problem     Left ear-Soreness on outer ear-Also feels like alot of wax     HPI      Ear problem    Onset of symptoms was 5 day(s) ago.  Course of illness is same.    Severity moderate  Current and Associated symptoms: left ear pain, itching  Denies ear drainage. No fever/chills or ST.  Treatment measures tried include tried cleaning the wax out.  Predisposing factors include None.  He wears hearing aid.      Review of Systems  Constitutional, HEENT, cardiovascular, pulmonary, GI, , musculoskeletal, neuro, skin, endocrine and psych systems are negative, except as otherwise noted.      Objective    BP (!) 148/73   Pulse 63   Temp 98.8  F (37.1  C) (Oral)   Resp 20   SpO2 94%   Physical Exam   GENERAL: healthy, alert and no distress  HENT: right ear canal and TM normal, left ear canal is erythematous and slightly edematous, tenderness with traction of the tragus, left TM is normal.  Mouth without ulcers or lesions  MS: no gross musculoskeletal defects noted, no edema

## 2021-08-24 NOTE — PATIENT INSTRUCTIONS
Patient Education     External Ear Infection (Adult)    External otitis (also called  swimmer s ear ) is an infection in the ear canal. It's often caused by bacteria or fungus. It can occur a few days after water gets trapped in the ear canal (from swimming or bathing). It can also occur after cleaning too deeply in the ear canal with a cotton swab or other object. Sometimes, hair care products get into the ear canal and cause this problem.   Symptoms can include pain, fever, itching, redness, drainage, or swelling of the ear canal. Temporary hearing loss may also occur.   Home care    Don't try to clean the ear canal. This can push pus and bacteria deeper into the canal.    Use prescribed ear drops as directed. These help reduce swelling and fight the infection. If an ear wick was placed in the ear canal, apply drops right onto the end of the wick. The wick will draw the medicine into the ear canal even if it's swollen closed.    A cotton ball may be loosely placed in the outer ear to absorb any drainage.    You may use over-the-counter medicines to control pain as directed by the healthcare provider, unless another medicine was prescribed. Talk with your provider before using these medicines if you have chronic liver or kidney disease or ever had a stomach ulcer or digestive tract bleeding.    Don't allow water to get into your ear when bathing. Also don't swim until the infection has cleared.    Prevention    Keep your ears dry. This helps lower the risk of infection. Dry your ears with a towel or hair dryer after getting wet. Also, use ear plugs when swimming.    Don't stick any objects in the ear to remove wax.    Talk with your provider about using ear drops to prevent swimmer's ear in case you feel water trapped in your ear canal. You can get these drops over the counter at most drugstores. They work by removing water from the ear canal.    Follow-up care  Follow up with your healthcare provider in 1 week,  or as advised.   When to seek medical advice  Call your healthcare provider right away if any of these occur:     Ear pain becomes worse or doesn t improve after 3 days of treatment    Redness or swelling of the outer ear occurs or gets worse    Headache    Fever of 100.4 F (38 C) or higher, or as directed by your healthcare provider  Call 911  Call 911 or get immediate medical care if any of the following occur:     Seizure    Unusual drowsiness or confusion    Unusual painful or stiff neck    David last reviewed this educational content on 8/1/2020 2000-2021 [employer]. All rights reserved. This information is not intended as a substitute for professional medical care. Always follow your healthcare professional's instructions.

## 2021-09-05 ENCOUNTER — HEALTH MAINTENANCE LETTER (OUTPATIENT)
Age: 76
End: 2021-09-05

## 2022-01-07 NOTE — TELEPHONE ENCOUNTER
Pt walked in, see other wellbutrin refill encounter  Yun Washington, RN, BSN  Message handled by Nurse Triage.     Home

## 2022-02-17 PROBLEM — Z28.21 SEVERE ACUTE RESPIRATORY SYNDROME CORONAVIRUS 2 (SARS-COV-2) VACCINATION DECLINED: Status: ACTIVE | Noted: 2021-02-25

## 2022-03-07 DIAGNOSIS — E03.4 HYPOTHYROIDISM DUE TO ACQUIRED ATROPHY OF THYROID: ICD-10-CM

## 2022-03-07 DIAGNOSIS — I10 ESSENTIAL HYPERTENSION WITH GOAL BLOOD PRESSURE LESS THAN 140/90: ICD-10-CM

## 2022-03-08 RX ORDER — METOPROLOL SUCCINATE 50 MG/1
TABLET, EXTENDED RELEASE ORAL
Qty: 30 TABLET | Refills: 0 | Status: SHIPPED | OUTPATIENT
Start: 2022-03-08 | End: 2022-03-16

## 2022-03-08 RX ORDER — LEVOTHYROXINE SODIUM 88 UG/1
TABLET ORAL
Qty: 30 TABLET | Refills: 0 | Status: SHIPPED | OUTPATIENT
Start: 2022-03-08 | End: 2022-03-16

## 2022-03-08 NOTE — TELEPHONE ENCOUNTER
Prescription approved per [facility] Refill Protocol.  Pooja Mccormack, RN, BSN, PHN  [facility]

## 2022-03-16 ENCOUNTER — OFFICE VISIT (OUTPATIENT)
Dept: FAMILY MEDICINE | Facility: CLINIC | Age: 77
End: 2022-03-16
Payer: COMMERCIAL

## 2022-03-16 ENCOUNTER — ANCILLARY PROCEDURE (OUTPATIENT)
Dept: GENERAL RADIOLOGY | Facility: CLINIC | Age: 77
End: 2022-03-16
Attending: FAMILY MEDICINE
Payer: COMMERCIAL

## 2022-03-16 VITALS
RESPIRATION RATE: 18 BRPM | TEMPERATURE: 98.5 F | HEART RATE: 62 BPM | WEIGHT: 175.38 LBS | BODY MASS INDEX: 29.18 KG/M2 | SYSTOLIC BLOOD PRESSURE: 116 MMHG | DIASTOLIC BLOOD PRESSURE: 75 MMHG | OXYGEN SATURATION: 96 %

## 2022-03-16 DIAGNOSIS — R05.9 COUGH: ICD-10-CM

## 2022-03-16 DIAGNOSIS — I71.40 ABDOMINAL AORTIC ANEURYSM (AAA) >39 MM DIAMETER (H): ICD-10-CM

## 2022-03-16 DIAGNOSIS — E03.4 HYPOTHYROIDISM DUE TO ACQUIRED ATROPHY OF THYROID: ICD-10-CM

## 2022-03-16 DIAGNOSIS — J43.1 PANLOBULAR EMPHYSEMA (H): ICD-10-CM

## 2022-03-16 DIAGNOSIS — E78.5 HYPERLIPIDEMIA LDL GOAL <100: ICD-10-CM

## 2022-03-16 DIAGNOSIS — Z87.891 PERSONAL HISTORY OF TOBACCO USE: ICD-10-CM

## 2022-03-16 DIAGNOSIS — I10 ESSENTIAL HYPERTENSION WITH GOAL BLOOD PRESSURE LESS THAN 140/90: ICD-10-CM

## 2022-03-16 DIAGNOSIS — M51.369 DDD (DEGENERATIVE DISC DISEASE), LUMBAR: ICD-10-CM

## 2022-03-16 DIAGNOSIS — E11.9 TYPE 2 DIABETES MELLITUS WITHOUT COMPLICATION, WITHOUT LONG-TERM CURRENT USE OF INSULIN (H): ICD-10-CM

## 2022-03-16 DIAGNOSIS — Z00.00 ENCOUNTER FOR MEDICARE ANNUAL WELLNESS EXAM: Primary | ICD-10-CM

## 2022-03-16 PROBLEM — E66.01 MORBID OBESITY (H): Status: RESOLVED | Noted: 2021-02-25 | Resolved: 2022-03-16

## 2022-03-16 PROBLEM — Z28.21 SEVERE ACUTE RESPIRATORY SYNDROME CORONAVIRUS 2 (SARS-COV-2) VACCINATION DECLINED: Status: RESOLVED | Noted: 2021-02-25 | Resolved: 2022-03-16

## 2022-03-16 LAB — HBA1C MFR BLD: 7.2 % (ref 0–5.6)

## 2022-03-16 PROCEDURE — 71046 X-RAY EXAM CHEST 2 VIEWS: CPT | Performed by: RADIOLOGY

## 2022-03-16 PROCEDURE — 36415 COLL VENOUS BLD VENIPUNCTURE: CPT | Performed by: FAMILY MEDICINE

## 2022-03-16 PROCEDURE — 84443 ASSAY THYROID STIM HORMONE: CPT | Performed by: FAMILY MEDICINE

## 2022-03-16 PROCEDURE — 80053 COMPREHEN METABOLIC PANEL: CPT | Performed by: FAMILY MEDICINE

## 2022-03-16 PROCEDURE — 99214 OFFICE O/P EST MOD 30 MIN: CPT | Mod: 25 | Performed by: FAMILY MEDICINE

## 2022-03-16 PROCEDURE — 83036 HEMOGLOBIN GLYCOSYLATED A1C: CPT | Performed by: FAMILY MEDICINE

## 2022-03-16 PROCEDURE — 84439 ASSAY OF FREE THYROXINE: CPT | Performed by: FAMILY MEDICINE

## 2022-03-16 PROCEDURE — 99207 PR FOOT EXAM NO CHARGE: CPT | Mod: 25 | Performed by: FAMILY MEDICINE

## 2022-03-16 PROCEDURE — 80061 LIPID PANEL: CPT | Performed by: FAMILY MEDICINE

## 2022-03-16 PROCEDURE — 99397 PER PM REEVAL EST PAT 65+ YR: CPT | Performed by: FAMILY MEDICINE

## 2022-03-16 PROCEDURE — 82043 UR ALBUMIN QUANTITATIVE: CPT | Performed by: FAMILY MEDICINE

## 2022-03-16 RX ORDER — ATORVASTATIN CALCIUM 40 MG/1
40 TABLET, FILM COATED ORAL DAILY
Qty: 90 TABLET | Refills: 3 | Status: SHIPPED | OUTPATIENT
Start: 2022-03-16 | End: 2023-04-04

## 2022-03-16 RX ORDER — METOPROLOL SUCCINATE 50 MG/1
50 TABLET, EXTENDED RELEASE ORAL DAILY
Qty: 90 TABLET | Refills: 1 | Status: SHIPPED | OUTPATIENT
Start: 2022-03-16 | End: 2022-09-02

## 2022-03-16 RX ORDER — LEVOTHYROXINE SODIUM 88 UG/1
88 TABLET ORAL DAILY
Qty: 90 TABLET | Refills: 3 | Status: SHIPPED | OUTPATIENT
Start: 2022-03-16 | End: 2023-03-27

## 2022-03-16 RX ORDER — LISINOPRIL 10 MG/1
10 TABLET ORAL DAILY
Qty: 90 TABLET | Refills: 1 | Status: SHIPPED | OUTPATIENT
Start: 2022-03-16 | End: 2022-09-14

## 2022-03-16 ASSESSMENT — ACTIVITIES OF DAILY LIVING (ADL): CURRENT_FUNCTION: NO ASSISTANCE NEEDED

## 2022-03-16 NOTE — PROGRESS NOTES
"SUBJECTIVE:   Rubio Gibbons is a 76 year old male who presents for Preventive Visit.        Are you in the first 12 months of your Medicare coverage?      Healthy Habits:     In general, how would you rate your overall health?  Good    Frequency of exercise:  6-7 days/week    Duration of exercise:  Less than 15 minutes    Do you usually eat at least 4 servings of fruit and vegetables a day, include whole grains    & fiber and avoid regularly eating high fat or \"junk\" foods?  Yes    Taking medications regularly:  Yes    Medication side effects:  None    Ability to successfully perform activities of daily living:  No assistance needed    Home Safety:  No safety concerns identified    Hearing Impairment:  No hearing concerns    In the past 6 months, have you been bothered by leaking of urine? Yes    In general, how would you rate your overall mental or emotional health?  Excellent      PHQ-2 Total Score: 0    Additional concerns today:  No    Do you feel safe in your environment?     Have you ever done Advance Care Planning? (For example, a Health Directive, POLST, or a discussion with a medical provider or your loved ones about your wishes):        Fall risk      Cognitive Screening         Reviewed and updated as needed this visit by clinical staff   Tobacco  Allergies    Med Hx  Surg Hx  Fam Hx  Soc Hx        Reviewed and updated as needed this visit by Provider                 Social History     Tobacco Use     Smoking status: Former Smoker     Packs/day: 0.25     Years: 40.00     Pack years: 10.00     Types: Cigarettes     Smokeless tobacco: Never Used     Tobacco comment: last one was 10/13 (trying to quit)   Substance Use Topics     Alcohol use: No         Alcohol Use 3/16/2022   Prescreen: >3 drinks/day or >7 drinks/week? Not Applicable   Prescreen: >3 drinks/day or >7 drinks/week? -               Current providers sharing in care for this patient include:   Patient Care Team:  Rigo Goddard MD as PCP - " "General (Family Practice)  Rigo Goddard MD as Assigned PCP  Ayush Mays MD as Assigned Heart and Vascular Provider    The following health maintenance items are reviewed in Epic and correct as of today:  Health Maintenance Due   Topic Date Due     LUNG CANCER SCREENING  Never done     EYE EXAM  11/01/2017     FALL RISK ASSESSMENT  10/24/2020     BMP  08/14/2021     CMP  08/14/2021     LIPID  08/14/2021     MICROALBUMIN  08/14/2021     DIABETIC FOOT EXAM  08/14/2021     A1C  08/25/2021     COLORECTAL CANCER SCREENING  11/17/2021     TSH W/FREE T4 REFLEX  02/25/2022               Review of Systems      OBJECTIVE:   /75 (BP Location: Right arm, Patient Position: Chair, Cuff Size: Adult Regular)   Pulse 62   Temp 98.5  F (36.9  C) (Oral)   Resp 18   Wt 79.5 kg (175 lb 6 oz)   SpO2 96%   BMI 29.18 kg/m   Estimated body mass index is 29.18 kg/m  as calculated from the following:    Height as of 4/14/21: 1.651 m (5' 5\").    Weight as of this encounter: 79.5 kg (175 lb 6 oz).  Physical Exam          ASSESSMENT / PLAN:           COUNSELING:      Estimated body mass index is 29.18 kg/m  as calculated from the following:    Height as of 4/14/21: 1.651 m (5' 5\").    Weight as of this encounter: 79.5 kg (175 lb 6 oz).        He reports that he has quit smoking. His smoking use included cigarettes. He has a 10.00 pack-year smoking history. He has never used smokeless tobacco.      Appropriate preventive services were discussed with this patient, including applicable screening as appropriate for cardiovascular disease, diabetes, osteopenia/osteoporosis, and glaucoma.  As appropriate for age/gender, discussed screening for colorectal cancer, prostate cancer, breast cancer, and cervical cancer. Checklist reviewing preventive services available has been given to the patient.    Reviewed patients plan of care and provided an AVS. The  ho Bergeron meets the Care Plan requirement. This Care Plan has been established " and reviewed with the   Counseling Resources:  ATP IV Guidelines  Pooled Cohorts Equation Calculator  Breast Cancer Risk Calculator  Breast Cancer: Medication to Reduce Risk  FRAX Risk Assessment  ICSI Preventive Guidelines  Dietary Guidelines for Americans, 2010  USDA's MyPlate  ASA Prophylaxis  Lung CA Screening    Rigo Goddard MD  [facility]    Identified Health Risks:

## 2022-03-16 NOTE — PROGRESS NOTES
Lung Cancer Screening Shared Decision Making Visit     Rubio Gibbons is not eligible for lung cancer screening on the basis of the information provided in my signed lung cancer screening order. Rubio's smoking history is below the threshold and so it is not recommended.    Patient is not currently a smoker and so we did not discuss that the only way to prevent lung cancer is to not smoke. Smoking cessation counseling was not given.    I did not offer risk estimation using a calculator such as this one:    ShouldIScreen    Rigo Goddard MD

## 2022-03-16 NOTE — PROGRESS NOTES
"  SUBJECTIVE:   Rubio Gibbons is a 76 year old male who presents for Preventive Visit.      Patient has been advised of split billing requirements and indicates understanding: Yes  Are you in the first 12 months of your Medicare Part B coverage?  No    Physical Health:    In general, how would you rate your overall physical health? good    Outside of work, how many days during the week do you exercise? 6-7 days/week    Outside of work, approximately how many minutes a day do you exercise?greater than 60 minutes    If you drink alcohol do you typically have >3 drinks per day or >7 drinks per week? Not Applicable    Do you usually eat at least 4 servings of fruit and vegetables a day, include whole grains & fiber and avoid regularly eating high fat or \"junk\" foods? NO    Do you have any problems taking medications regularly?  No    Do you have any side effects from medications? none    Needs assistance for the following daily activities: no assistance needed    Which of the following safety concerns are present in your home?  none identified     Hearing impairment: Yes, Feel that people are mumbling or not speaking clearly.    Need to ask people to speak up or repeat themselves.    Difficulty understanding soft or whispered speech.    In the past 6 months, have you been bothered by leaking of urine? yes    Mental Health:    In general, how would you rate your overall mental or emotional health? excellent  PHQ-2 Score:      Do you feel safe in your environment? Yes    Have you ever done Advance Care Planning? (For example, a Health Directive, POLST, or a discussion with a medical provider or your loved ones about your wishes): No, advance care planning information given to patient to review.  Patient plans to discuss their wishes with loved ones or provider.      Additional concerns to address?  YES    Fall risk:  Fallen 2 or more times in the past year?: No  Any fall with injury in the past year?: No    Cognitive " Screenin) Repeat 3 items (Leader, Season, Table)    2) Clock draw: NORMAL  3) 3 item recall: Recalls 3 objects  Results: 3 items recalled: COGNITIVE IMPAIRMENT LESS LIKELY    Mini-CogTM Copyright REINALDO Vo. Licensed by the author for use in [facility] [employer]; reprinted with permission (shalini@[employer]). All rights reserved.      Do you have sleep apnea, excessive snoring or daytime drowsiness?: no            Reviewed and updated as needed this visit by clinical staff   Tobacco  Allergies    Med Hx  Surg Hx  Fam Hx  Soc Hx        Reviewed and updated as needed this visit by Provider                 Social History     Tobacco Use     Smoking status: Former Smoker     Packs/day: 0.25     Years: 40.00     Pack years: 10.00     Types: Cigarettes     Smokeless tobacco: Never Used     Tobacco comment: last one was 10/13 (trying to quit)   Substance Use Topics     Alcohol use: No                           Current providers sharing in care for this patient include:   Patient Care Team:  Rigo Goddard MD as PCP - General (Family Practice)  Rigo Goddard MD as Assigned PCP  Ayush Mays MD as Assigned Heart and Vascular Provider    The following health maintenance items are reviewed in Epic and correct as of today:  Health Maintenance   Topic Date Due     LUNG CANCER SCREENING  Never done     EYE EXAM  2017     FALL RISK ASSESSMENT  10/24/2020     BMP  2021     CMP  2021     LIPID  2021     MICROALBUMIN  2021     DIABETIC FOOT EXAM  2021     A1C  2021     ADVANCE CARE PLANNING  2021     COLORECTAL CANCER SCREENING  2021     PHQ-2 (once per calendar year)  2022     TSH W/FREE T4 REFLEX  2022     ANNUAL REVIEW OF HM ORDERS  2022     MEDICARE ANNUAL WELLNESS VISIT  2023     DTAP/TDAP/TD IMMUNIZATION (3 - Td or Tdap) 2026     SPIROMETRY  Completed     HEPATITIS C SCREENING  Completed     COPD ACTION PLAN  Completed      "INFLUENZA VACCINE  Completed     Pneumococcal Vaccine: 65+ Years  Completed     ZOSTER IMMUNIZATION  Completed     COVID-19 Vaccine  Completed     IPV IMMUNIZATION  Aged Out     MENINGITIS IMMUNIZATION  Aged Out           ROS:  Cough is described.  Presbycusis is stable.  No visual disturbance no dysphagia no chest pain no GI symptoms no  symptoms.  Musculoskeletal complaints as described no others.    OBJECTIVE:   /75 (BP Location: Right arm, Patient Position: Chair, Cuff Size: Adult Regular)   Pulse 62   Temp 98.5  F (36.9  C) (Oral)   Resp 18   Wt 79.5 kg (175 lb 6 oz)   SpO2 96%   BMI 29.18 kg/m   Estimated body mass index is 29.18 kg/m  as calculated from the following:    Height as of 4/14/21: 1.651 m (5' 5\").    Weight as of this encounter: 79.5 kg (175 lb 6 oz).  EXAM:   Presbycusis evident.  Ears are free of cerumen  Neck without nodes or thyromegaly supple  Chest is clear  HEART: S1S2 RRR no murmur nor apical displacement  ABDOMEN w palpable aorta  Negative straight leg raises no peripheral edema      ASSESSMENT / PLAN:     Problem List Items Addressed This Visit     Abdominal aortic aneurysm (AAA) >39 mm diameter (H)     Measure yearly.  Interventions have been discussed with patient by vascular         Relevant Orders     Aorta Medicare AAA Screening    Cough     Longstanding.  Chest x-ray reassuring.  Previous Spiriva ineffective.  Alternate inhaler         Relevant Medications    fluticasone-vilanterol (BREO ELLIPTA) 100-25 MCG/INH inhaler    Other Relevant Orders    XR Chest 2 Views    DDD (degenerative disc disease), lumbar     At the gym every day, he reports discomfort in his hamstrings with prolonged standing or walking.  Exercise program reviewed, does not include hamstring stretching or strengthening.  Suspect neurogenic claudication.  However, first, describe hamstring strengthening and stretching.  Patient will pursue at the gym         Diabetes mellitus type 2, uncomplicated " "(H)     Diet controlled.  Reassess         Relevant Medications    levothyroxine (SYNTHROID/LEVOTHROID) 88 MCG tablet    Other Relevant Orders    COMPREHENSIVE METABOLIC PANEL    Albumin Random Urine Quantitative with Creat Ratio    HEMOGLOBIN A1C (Completed)    Lipid panel reflex to direct LDL Non-fasting    FOOT EXAM (Completed)    Adult Eye Referral    Essential hypertension with goal blood pressure less than 140/90     Controlled.  Continue         Relevant Medications    lisinopril (ZESTRIL) 10 MG tablet    metoprolol succinate ER (TOPROL-XL) 50 MG 24 hr tablet    HYPERLIPIDEMIA LDL GOAL <100     Statin therapy.  Continue         Relevant Medications    atorvastatin (LIPITOR) 40 MG tablet    Other Relevant Orders    COMPREHENSIVE METABOLIC PANEL    Lipid panel reflex to direct LDL Non-fasting    Hypothyroidism due to acquired atrophy of thyroid     On repletion.  Yearly TSH         Relevant Medications    levothyroxine (SYNTHROID/LEVOTHROID) 88 MCG tablet    Other Relevant Orders    TSH WITH FREE T4 REFLEX    Panlobular emphysema (H)     No other therapies but cough.  Treat         Relevant Medications    fluticasone-vilanterol (BREO ELLIPTA) 100-25 MCG/INH inhaler    Other Relevant Orders    XR Chest 2 Views    Personal history of tobacco use     Exposure inadequate for CT lung cancer screening         Relevant Orders    Prof Fee: Shared Decision Making Visit for Lung Cancer Screening (Completed)      Other Visit Diagnoses     Encounter for Medicare annual wellness exam    -  Primary              COUNSELING:  Reviewed preventive health counseling, as reflected in patient instructions    Estimated body mass index is 29.18 kg/m  as calculated from the following:    Height as of 4/14/21: 1.651 m (5' 5\").    Weight as of this encounter: 79.5 kg (175 lb 6 oz).    Weight management plan: He is proud of his volitional weight loss    He reports that he has quit smoking. His smoking use included cigarettes. He has a " "10.00 pack-year smoking history. He has never used smokeless tobacco.    Appropriate preventive services were discussed with this patient, including applicable screening as appropriate for cardiovascular disease, diabetes, osteopenia/osteoporosis, and glaucoma.  As appropriate for age/gender, discussed screening for colorectal cancer, prostate cancer, breast cancer, and cervical cancer. Checklist reviewing preventive services available has been given to the patient.    Reviewed patients plan of care and provided an AVS. The Basic Care Plan (routine screening as documented in Health Maintenance) for Rubio meets the Care Plan requirement. This Care Plan has been established and reviewed with the Patient.    Counseling Resources:  ATP IV Guidelines  Pooled Cohorts Equation Calculator  Breast Cancer Risk Calculator  BRCA-Related Cancer Risk Assessment: FHS-7 Tool  FRAX Risk Assessment  ICSI Preventive Guidelines  Dietary Guidelines for Americans, 2010  [employer]'s MyPlate  ASA Prophylaxis  Lung CA Screening    Rigo Goddard MD  [facility]  Answers for HPI/ROS submitted by the patient on 3/16/2022  In general, how would you rate your overall physical health?: good  Frequency of exercise:: 6-7 days/week  Do you usually eat at least 4 servings of fruit and vegetables a day, include whole grains & fiber, and avoid regularly eating high fat or \"junk\" foods? : Yes  Taking medications regularly:: Yes  Medication side effects:: None  Activities of Daily Living: no assistance needed  Home safety: no safety concerns identified  Hearing Impairment:: no hearing concerns  In the past 6 months, have you been bothered by leaking of urine?: Yes  In general, how would you rate your overall mental or emotional health?: excellent  Additional concerns today:: No  Duration of exercise:: Less than 15 minutes      "

## 2022-03-16 NOTE — PATIENT INSTRUCTIONS
Patient Education   Personalized Prevention Plan  You are due for the preventive services outlined below.  Your care team is available to assist you in scheduling these services.  If you have already completed any of these items, please share that information with your care team to update in your medical record.  Health Maintenance Due   Topic Date Due     LUNG CANCER SCREENING  Never done     Eye Exam  11/01/2017     Annual Wellness Visit  09/25/2018     FALL RISK ASSESSMENT  10/24/2020     Basic Metabolic Panel  08/14/2021     Comprehensive Metabolic Panel  08/14/2021     Cholesterol Lab  08/14/2021     Kidney Microalbumin Urine Test  08/14/2021     Diabetic Foot Exam  08/14/2021     A1C Lab  08/25/2021     Discuss Advance Care Planning  09/22/2021     Colorectal Cancer Screening  11/17/2021     PHQ-2 (once per calendar year)  01/01/2022     Thyroid Function Lab  02/25/2022           Sween Cream    Eucerin Cream   stated

## 2022-03-17 DIAGNOSIS — J43.1 PANLOBULAR EMPHYSEMA (H): Primary | ICD-10-CM

## 2022-03-17 DIAGNOSIS — I71.40 ABDOMINAL AORTIC ANEURYSM (AAA) >39 MM DIAMETER (H): ICD-10-CM

## 2022-03-17 LAB
ALBUMIN SERPL-MCNC: 3.7 G/DL (ref 3.4–5)
ALP SERPL-CCNC: 72 U/L (ref 40–150)
ALT SERPL W P-5'-P-CCNC: 23 U/L (ref 0–70)
ANION GAP SERPL CALCULATED.3IONS-SCNC: 7 MMOL/L (ref 3–14)
AST SERPL W P-5'-P-CCNC: 19 U/L (ref 0–45)
BILIRUB SERPL-MCNC: 0.5 MG/DL (ref 0.2–1.3)
BUN SERPL-MCNC: 21 MG/DL (ref 7–30)
CALCIUM SERPL-MCNC: 9.9 MG/DL (ref 8.5–10.1)
CHLORIDE BLD-SCNC: 105 MMOL/L (ref 94–109)
CHOLEST SERPL-MCNC: 131 MG/DL
CO2 SERPL-SCNC: 27 MMOL/L (ref 20–32)
CREAT SERPL-MCNC: 1.16 MG/DL (ref 0.66–1.25)
FASTING STATUS PATIENT QL REPORTED: ABNORMAL
GFR SERPL CREATININE-BSD FRML MDRD: 65 ML/MIN/1.73M2
GLUCOSE BLD-MCNC: 134 MG/DL (ref 70–99)
HDLC SERPL-MCNC: 39 MG/DL
LDLC SERPL CALC-MCNC: 58 MG/DL
NONHDLC SERPL-MCNC: 92 MG/DL
POTASSIUM BLD-SCNC: 4.9 MMOL/L (ref 3.4–5.3)
PROT SERPL-MCNC: 7.3 G/DL (ref 6.8–8.8)
SODIUM SERPL-SCNC: 139 MMOL/L (ref 133–144)
T4 FREE SERPL-MCNC: 1.04 NG/DL (ref 0.76–1.46)
TRIGL SERPL-MCNC: 168 MG/DL
TSH SERPL DL<=0.005 MIU/L-ACNC: 4.37 MU/L (ref 0.4–4)

## 2022-03-17 NOTE — ASSESSMENT & PLAN NOTE
At the gym every day, he reports discomfort in his hamstrings with prolonged standing or walking.  Exercise program reviewed, does not include hamstring stretching or strengthening.  Suspect neurogenic claudication.  However, first, describe hamstring strengthening and stretching.  Patient will pursue at the gym

## 2022-03-18 LAB
CREAT UR-MCNC: 172 MG/DL
MICROALBUMIN UR-MCNC: 60 MG/L
MICROALBUMIN/CREAT UR: 34.88 MG/G CR (ref 0–17)

## 2022-03-18 NOTE — RESULT ENCOUNTER NOTE
X-ray suggests your lungs are scarred a bit more.  I am glad you quit smoking  Rigo Goddard MD

## 2022-03-29 ENCOUNTER — TELEPHONE (OUTPATIENT)
Dept: OTHER | Facility: CLINIC | Age: 77
End: 2022-03-29
Payer: COMMERCIAL

## 2022-03-29 ENCOUNTER — HOSPITAL ENCOUNTER (OUTPATIENT)
Dept: ULTRASOUND IMAGING | Facility: CLINIC | Age: 77
Discharge: HOME | End: 2022-03-29
Attending: FAMILY MEDICINE | Admitting: FAMILY MEDICINE
Payer: COMMERCIAL

## 2022-03-29 DIAGNOSIS — I71.40 ABDOMINAL AORTIC ANEURYSM (AAA) >39 MM DIAMETER (H): ICD-10-CM

## 2022-03-29 DIAGNOSIS — I71.40 ABDOMINAL AORTIC ANEURYSM (AAA) >39 MM DIAMETER (H): Primary | ICD-10-CM

## 2022-03-29 PROCEDURE — 76706 US ABDL AORTA SCREEN AAA: CPT

## 2022-03-29 NOTE — RESULT ENCOUNTER NOTE
That aorta is a little bigger.  It is time to see Dr. Mays again.  I have sent in your name.  You can call him as well.  Rigo Goddard MD

## 2022-03-29 NOTE — TELEPHONE ENCOUNTER
Scheduled for 4/6/22.     Future Appointments   Date Time Provider Department Center   4/6/2022 11:20 AM RSCCCT1 [facility]CT [facility]   4/6/2022  2:40 PM Ayush Mays MD RHSUR SXR   6/15/2022  1:30 PM Rigo Goddard MD CRFP CR         [facility]    [facility]   [phone]

## 2022-03-29 NOTE — TELEPHONE ENCOUNTER
Pt referred to [facility] by Dr. Goddard for 5.5cm AAA.    Pt already established with Dr. Mays; LOV 4/14/21.    Pt needs to be scheduled for CTA abdomen/pelvis and in person visit with Dr. Mays within one week.    Component      Latest Ref Rng & Units 3/16/2022   Creatinine      0.66 - 1.25 mg/dL 1.16   GFR Estimate      >60 mL/min/1.73m2 65     Appt note: Ref by Dr. Goddard for 5.5cm AAA; pt already established with Dr. Mays, LOV 4/14/21 notes and imaging in [facility], CTA A/P to be scheduled.  Creat 1.16, GFR 65 on 3/16/22, no contrast allergy per chart.    Kathleen Reynoso, BSN, RN-[facility]

## 2022-04-06 ENCOUNTER — OFFICE VISIT (OUTPATIENT)
Dept: SURGERY | Facility: CLINIC | Age: 77
End: 2022-04-06
Attending: FAMILY MEDICINE
Payer: COMMERCIAL

## 2022-04-06 ENCOUNTER — HOSPITAL ENCOUNTER (OUTPATIENT)
Dept: CT IMAGING | Facility: CLINIC | Age: 77
Discharge: HOME | End: 2022-04-06
Attending: SURGERY | Admitting: SURGERY
Payer: COMMERCIAL

## 2022-04-06 VITALS
DIASTOLIC BLOOD PRESSURE: 68 MMHG | HEIGHT: 65 IN | BODY MASS INDEX: 29.16 KG/M2 | WEIGHT: 175 LBS | OXYGEN SATURATION: 95 % | SYSTOLIC BLOOD PRESSURE: 110 MMHG | HEART RATE: 59 BPM | RESPIRATION RATE: 16 BRPM

## 2022-04-06 DIAGNOSIS — I71.40 ABDOMINAL AORTIC ANEURYSM (AAA) >39 MM DIAMETER (H): ICD-10-CM

## 2022-04-06 PROCEDURE — 250N000011 HC RX IP 250 OP 636: Performed by: SURGERY

## 2022-04-06 PROCEDURE — 99215 OFFICE O/P EST HI 40 MIN: CPT | Performed by: SURGERY

## 2022-04-06 PROCEDURE — 74174 CTA ABD&PLVS W/CONTRAST: CPT

## 2022-04-06 PROCEDURE — 250N000009 HC RX 250: Performed by: SURGERY

## 2022-04-06 RX ORDER — IOPAMIDOL 755 MG/ML
500 INJECTION, SOLUTION INTRAVASCULAR ONCE
Status: COMPLETED | OUTPATIENT
Start: 2022-04-06 | End: 2022-04-06

## 2022-04-06 RX ADMIN — SODIUM CHLORIDE 80 ML: 9 INJECTION, SOLUTION INTRAVENOUS at 11:19

## 2022-04-06 RX ADMIN — IOPAMIDOL 80 ML: 755 INJECTION, SOLUTION INTRAVENOUS at 11:17

## 2022-04-06 NOTE — PROGRESS NOTES
Rubio Gibbons is a 77-year-old gentleman seen by me for vascular surgical consultation on 4/14/2021 for a slowly enlarging 4.9 cm infrarenal AAA.  This aneurysm was noted to be 4.4 cm in diameter in 2016.  Annual AAA ultrasound on 3/29/2022 showed maximal AAA diameter of 5.5 cm with bilateral common iliac involvement.  He underwent a CTA of the abdomen and pelvis earlier today and presents at this time to discuss those results.    He was accompanied today by his wife, Montserrat.  He is in good spirits and has no complaints.  He remains a physically active individual who enjoys working out in the gymnasium several times per week.  He denies claudication.  No prior abdominal surgeries.  He does have history of myocardial infarction in 1998 and underwent percutaneous coronary intervention at [facility] with angioplasty alone.    Exam:  Well-developed male alert and oriented x3.  Blood pressure 110/68 with a pulse of 59.  Height 5 feet 5 inches.  Weight 175 pounds.  Abdomen flat, soft, nontender.  Prominent supraumbilical aortic pulsation.  2+ palpable femoral pulses bilaterally.  2+ palpable DP pulses bilaterally.    Creatinine 1.16 with GFR of 65.      IMAGING:  I reviewed the CTA of the abdomen and pelvis performed earlier today.  It had not yet been officially interpreted.  By my read maximal AAA diameter was 5.4 cm with a 1.8 cm right common iliac artery and a 1.9 cm left common iliac artery.  There was a moderate amount of thrombus posteriorly in the infrarenal aortic neck with 2 patent lumbar arteries at this level.    ASSESSMENT:  77-year-old seemingly fit individual with a 5.4 cm infrarenal AAA with bilateral common iliac artery ectasia.    RECOMMENDATION:  I had a detailed discussion with Rubio and his wife reviewing all the above.  We discussed both open and endovascular AAA repair.  I have concerns about the durability and safety of endovascular AAA repair given the moderate thrombus in the infrarenal  aortic neck with patent lumbar arteries at that level.  If he could be medically cleared, I favor open AAA repair with an aortobiiliac graft.    I have thoroughly discussed the specifics of both procedures.  His aneurysm is asymptomatic and he is on the threshold for considering repair at 5.4 cm.  Given his prior remote cardiac history, I would seek preoperative cardiac clearance for potential open repair.  I will also obtain screening bilateral carotid ultrasounds.  I will meet with him again in the next 1-2 months to review all of the above and make final recommendations for probable open AAA repair.    All of their questions were answered and they verbalized full understanding to the above and agreement with this management plan.    Total length of this encounter was 45 minutes.    Konrad Mays MD

## 2022-04-07 ENCOUNTER — TELEPHONE (OUTPATIENT)
Dept: OTHER | Facility: CLINIC | Age: 77
End: 2022-04-07
Payer: COMMERCIAL

## 2022-04-07 DIAGNOSIS — I71.40 ABDOMINAL AORTIC ANEURYSM (AAA) >39 MM DIAMETER (H): Primary | ICD-10-CM

## 2022-04-07 NOTE — TELEPHONE ENCOUNTER
[facility]     Who is the name of the provider?:  Dr Mays       What is the location you see this provider at?: [place]       Reason for call:  Pt saw Dr Mays yesterday (04/06/22) - they talked about 2 options - Pt would like to start with the Stent Option first before Open Heart surgery if possible?      Contact number:  [phone]        Can we leave a detailed message on this number?  Yes but only on this number

## 2022-04-08 ENCOUNTER — TELEPHONE (OUTPATIENT)
Dept: OTHER | Facility: CLINIC | Age: 77
End: 2022-04-08
Payer: COMMERCIAL

## 2022-04-08 DIAGNOSIS — I71.40 ABDOMINAL AORTIC ANEURYSM (AAA) >39 MM DIAMETER (H): Primary | ICD-10-CM

## 2022-04-08 NOTE — TELEPHONE ENCOUNTER
Per 4/6/22 visit with Dr. Mays, pt needs bilateral carotid US and in clinic visit with Dr. Mays in approximately 2 weeks.  Routing to scheduling to coordinate.    Kathleen Reynoso, CARLOS ALBERTON, RN-[facility] [place]

## 2022-04-08 NOTE — TELEPHONE ENCOUNTER
Returned call to patient.  He asked if the procedure could start as an EVAR and then switch to the open AAA if needed.  Advised patient this is not done.      Patient stated he does not use MyChart, nor did he have the option to write down the number to call cardiology to re-establish care with them.  Referral entered.    Kathleen Reynoso, CARLOS [place]BERTON, RN-[facility]

## 2022-04-11 ENCOUNTER — TELEPHONE (OUTPATIENT)
Dept: OTHER | Facility: CLINIC | Age: 77
End: 2022-04-11

## 2022-04-11 NOTE — TELEPHONE ENCOUNTER
Patient completed a CTA abdomen pelvis and does not need the aorta US- it has been deleted.  Patient needs to be scheduled for bilateral carotid US and OV with Dr. Mays.    Karol CARCAMON, RN    [facility]  Office: [phone]  Fax: [phone]

## 2022-04-11 NOTE — TELEPHONE ENCOUNTER
[facility]    Who is the name of the provider?:  Davon      What is the location you see this provider at?: [place]    Reason for call:  Patient has an AAA ordered but not scheduled. Do we need to schedule this prior to his OV on the 20th?    Patient asked to be called back on [phone]     Can we leave a detailed message on this number?  YES     Singh Bolanos I   [facility]  Vascular [address] [id number] [phone]

## 2022-04-19 ENCOUNTER — TELEPHONE (OUTPATIENT)
Dept: OTHER | Facility: CLINIC | Age: 77
End: 2022-04-19
Payer: COMMERCIAL

## 2022-04-19 NOTE — TELEPHONE ENCOUNTER
Please move up scheduled US and visit with Dr. Mays to earlier appointments that are available at the [facility] clinic on 4/20/22 if possible.    Kathleen Reynoso, CARLOS ALBERTON, RN-[facility] Vascular [place]

## 2022-04-20 ENCOUNTER — OFFICE VISIT (OUTPATIENT)
Dept: SURGERY | Facility: CLINIC | Age: 77
End: 2022-04-20
Attending: SURGERY
Payer: COMMERCIAL

## 2022-04-20 ENCOUNTER — HOSPITAL ENCOUNTER (OUTPATIENT)
Dept: ULTRASOUND IMAGING | Facility: CLINIC | Age: 77
Discharge: HOME | End: 2022-04-20
Attending: SURGERY | Admitting: SURGERY
Payer: COMMERCIAL

## 2022-04-20 VITALS
DIASTOLIC BLOOD PRESSURE: 68 MMHG | HEIGHT: 65 IN | SYSTOLIC BLOOD PRESSURE: 126 MMHG | BODY MASS INDEX: 29.16 KG/M2 | WEIGHT: 175 LBS | OXYGEN SATURATION: 92 % | RESPIRATION RATE: 16 BRPM | HEART RATE: 63 BPM

## 2022-04-20 DIAGNOSIS — I71.40 ABDOMINAL AORTIC ANEURYSM (AAA) >39 MM DIAMETER (H): ICD-10-CM

## 2022-04-20 DIAGNOSIS — I71.40 ABDOMINAL AORTIC ANEURYSM (AAA) >39 MM DIAMETER (H): Primary | ICD-10-CM

## 2022-04-20 PROCEDURE — 99214 OFFICE O/P EST MOD 30 MIN: CPT | Performed by: SURGERY

## 2022-04-20 PROCEDURE — 93880 EXTRACRANIAL BILAT STUDY: CPT

## 2022-04-20 NOTE — PROGRESS NOTES
Rubio Gibbons is an active 77-year-old gentleman recently evaluated for a 5.5 cm AAA.  Please see my 4/6/2022 office note.  Rubio returns today with his wife and daughter to discuss the results of his screening bilateral carotid ultrasound.  We are having ongoing discussions as to whether he prefers open versus endovascular AAA repair.      He goes to the gym on a daily basis although he does have a remote history of myocardial infarction and prior coronary angioplasty in 1998.    I did not repeat a physical examination today.  He has a BMI of 29 and easily palpable pedal pulses bilaterally.    I reviewed the images of his recent CTA of the abdomen and pelvis with Rubio and his family.  Bilateral carotid ultrasound today shows minimal disease.    ASSESSMENT:  77-year-old gentleman with remote cardiac history and asymptomatic 5.5 cm infrarenal AAA with bilateral common iliac artery ectasia.    RECOMMENDATION:  I had an extended discussion with Rubio and his family reviewing all the above.  He has a somewhat hostile infrarenal aortic neck although he remains a candidate for endovascular AAA repair.  I believe open AAA repair would give him a more reliable and durable result.  He would need preoperative cardiac clearance.    I discussed the specifics, risks, and benefits of both endovascular versus open AAA repair.  I stressed that this is not an urgent matter and that we have time to figure things out.  If he can be cleared from a cardiac standpoint, I would favor open repair.  If there are concerns regarding his cardiac status, he certainly remains a candidate for endovascular aortic repair.  We will get together following his preoperative cardiac assessment to review his overall risk factors and to make a final determination as to his preferred method of AAA repair.    All of their questions were answered and they verbalized full understanding to the above and complete agreement with this management plan.    Total length  of this encounter was 30 minutes.    Konrad Mays MD

## 2022-04-25 ENCOUNTER — TELEPHONE (OUTPATIENT)
Dept: FAMILY MEDICINE | Facility: CLINIC | Age: 77
End: 2022-04-25
Payer: COMMERCIAL

## 2022-04-25 NOTE — TELEPHONE ENCOUNTER
Patient calling and states he needs Dr. Goddard to give him a cardiology referral.  Discussed Dr. Mays put cardiology referral in on 4/7/22.  Discussed below.  Gave patient scheduling # to call.  Patient agrees with plan.  Monique Brown RN      Reason for Consult: General Cardiology    Scheduling Instructions: [facility] will call you to coordinate your care as prescribed by your provider. If you don't hear from a representative within 2 business days, please call [phone]. previously established with Dr. Acosta   Additional Information: Dr. Mays requesting preoperative cardiac clearance for possible open repair vs. endovascular repair of AAA; please contact patient to schedule

## 2022-04-26 DIAGNOSIS — I71.40 ABDOMINAL AORTIC ANEURYSM (AAA) >39 MM DIAMETER (H): Primary | ICD-10-CM

## 2022-05-23 ENCOUNTER — TELEPHONE (OUTPATIENT)
Dept: OTHER | Facility: CLINIC | Age: 77
End: 2022-05-23
Payer: COMMERCIAL

## 2022-05-23 NOTE — TELEPHONE ENCOUNTER
[facility]    Who is the name of the provider?:  Davon      What is the location you see this provider at?: [place]    Reason for call:  He would like to know if it is OK to get a second covid booster shot.  He has already had the original and one booster of [employer].      He is seeing Dr. Mays on 6/21/22 for a follow up appointment and he speculates he may be needing surgery and wants to know if he should get the second booster or wait.    Please advise patient.      Can we leave a detailed message on this number?  YES can leave on home phone  [phone]

## 2022-05-24 NOTE — TELEPHONE ENCOUNTER
Returned call to patient.  Advised him he should proceed with getting the second booster if he is choosing to do so.  LVM with this update and requested patient call back with any further questions.    Kathleen Reynoso, CARLOS [place], RN-[facility] Vascular [place]

## 2022-06-07 ENCOUNTER — OFFICE VISIT (OUTPATIENT)
Dept: CARDIOLOGY | Facility: CLINIC | Age: 77
End: 2022-06-07
Attending: SURGERY
Payer: COMMERCIAL

## 2022-06-07 VITALS
HEART RATE: 58 BPM | WEIGHT: 174.9 LBS | HEIGHT: 65 IN | BODY MASS INDEX: 29.14 KG/M2 | SYSTOLIC BLOOD PRESSURE: 136 MMHG | OXYGEN SATURATION: 98 % | DIASTOLIC BLOOD PRESSURE: 68 MMHG

## 2022-06-07 DIAGNOSIS — I71.40 ABDOMINAL AORTIC ANEURYSM (AAA) >39 MM DIAMETER (H): ICD-10-CM

## 2022-06-07 PROCEDURE — 99204 OFFICE O/P NEW MOD 45 MIN: CPT | Performed by: INTERNAL MEDICINE

## 2022-06-07 PROCEDURE — 93000 ELECTROCARDIOGRAM COMPLETE: CPT | Performed by: INTERNAL MEDICINE

## 2022-06-07 NOTE — PROGRESS NOTES
HISTORY:    Rubio Gibbons is a very pleasant 77-year-old gentleman with a history of coronary artery disease.  He suffered a small MI back in 1998 at which time he underwent PTCA without stenting of, I believe, the LAD.  This was done at [facility].  He had presented at that time with classic angina and he recalls those symptoms well.  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    Today Rubio is asked to see cardiology for clearance.  He has developed progressive enlarging AAA, now measuring 5.5 cm and is being considered for either open repair or stenting.  Cardiology consultation was requested to evaluate his cardiovascular risk of these procedures.    On review of the chart I note that he had a nuclear stress test done in October 2019 in anticipation of clearance for back surgery.  At that time his ejection fraction was 54% and he had a small fixed apical defect without inducible ischemia.  This was a Lexiscan study.  He also has recently undergone a carotid ultrasound showing mild disease, less than 50% in the right and 50 to 69% in the left.  I reviewed the CTA of his abdomen which showed a 5.5 mm infrarenal abdominal aortic aneurysm with extensive atherosclerosis of the common femoral arteries and ectasia of both iliacs, both measuring less than 2 cm.    Rubio is an ex-smoker and quit in 2019.  He has a history of hyperlipidemia which has been well controlled with Lipitor as well as a history of hypertension well-controlled with a combination of metoprolol and lisinopril.    Rubio reports that he goes to the gym most days of the week and uses a stationary bike for 10 to 15 km a day.  He also does some upper extremity exercises.  He experiences no significant fatigue, only mild dyspnea which she contributes to his known mild COPD, and he has not developed chest pain with these vigorous activities.  As stated above, he remembers his angina from  25 years ago well and has never had a recurrence of that discomfort.  He denies thyroid cardiovascular symptoms as well, specifically any other type of exertional chest arm neck shoulder or jaw discomfort any sense of palpitations, syncope or near syncope, PND/orthopnea, peripheral edema, or symptoms of claudication.    Today's ECG is reviewed and shows sinus rhythm without significant or concerning abnormalities.      ASSESSMENT/PLAN:    1.  Coronary artery disease.  The patient remains physically very active without angina.  He does not need further cardiac evaluation at this time.  His cardiovascular risk of open AAA repair is slightly elevated compared to the normal population because of his history of coronary artery disease, but he is currently symptom-free and on appropriate medications to minimize that risk.  He is cleared for surgery without further cardiac evaluation.  2.  Hyperlipidemia.  Lipids are well controlled with atorvastatin, continue same.  3.  Hypertension.  Blood pressure adequately controlled with current medications, continue same.    Thank you for inviting me to participate in the care of your patient.  Please don't hesitate to call if I can be of further assistance.  48 minutes were spent today reviewing the chart and other records, interviewing and examining the patient, and documenting our visit.  This patient has no ongoing cardiovascular issues that require continued cardiology visits.  I will leave future visits open-ended.  I certainly be happy to see him should further cardiology questions arise.    Chart documentation was completed, in part, with [employer] voice-recognition software. Even though reviewed, some grammatical, spelling, and word errors may remain.       Orders Placed This Encounter   Procedures     EKG 12-lead complete w/read - Clinics (performed today)     No orders of the defined types were placed in this encounter.    There are no discontinued medications.    10 year  ASCVD risk: The 10-year ASCVD risk score (Jose JOHNSON Jr., et al., 2013) is: 57.3%    Values used to calculate the score:      Age: 77 years      Sex: Male      Is Non- : No      Diabetic: Yes      Tobacco smoker: No      Systolic Blood Pressure: 136 mmHg      Is BP treated: Yes      HDL Cholesterol: 39 mg/dL      Total Cholesterol: 131 mg/dL    Encounter Diagnosis   Name Primary?     Abdominal aortic aneurysm (AAA) >39 mm diameter (H)        CURRENT MEDICATIONS:  Current Outpatient Medications   Medication Sig Dispense Refill     aspirin (ASPIRIN LOW DOSE) 81 MG EC tablet Take 1 tablet (81 mg) by mouth daily 1 tablet 0     atorvastatin (LIPITOR) 40 MG tablet Take 1 tablet (40 mg) by mouth daily 90 tablet 3     fluticasone-salmeterol (ADVAIR) 250-50 MCG/DOSE inhaler Inhale 1 puff into the lungs every 12 hours 3 each 3     levothyroxine (SYNTHROID/LEVOTHROID) 88 MCG tablet Take 1 tablet (88 mcg) by mouth daily 90 tablet 3     lisinopril (ZESTRIL) 10 MG tablet Take 1 tablet (10 mg) by mouth daily 90 tablet 1     metoprolol succinate ER (TOPROL-XL) 50 MG 24 hr tablet Take 1 tablet (50 mg) by mouth daily 90 tablet 1       ALLERGIES     Allergies   Allergen Reactions     Chantix [Varenicline] GI Disturbance       PAST MEDICAL HISTORY:  Past Medical History:   Diagnosis Date     Abdominal aortic aneurysm (AAA) without rupture (H) 10/13/2016     CAD (coronary artery disease)      Coronary artery disease involving native coronary artery of native heart without angina pectoris 10/14/2019    Per Note. Dr. López OV 2-20-13 - to establish care. He has a history of myocardial infarction back in 1998 @ [facility] . Does not remember any details- and no stent.   Nuclear 3-12-13 LVEF 34% to establish care. He has a history of myocardial infarction back in 1998, small to moderately sized partially reversible distal anterior anteroseptal and apical defect, small to moderately sized inferio     COVID-19 virus  vaccination declined 2/25/2021     Diabetes mellitus type 2, uncomplicated (H) 9/22/2016    Diet      WIN (dyspnea on exertion) 11/21/2014     Heart attack (H) 1998     History of myocardial infarction in adulthood 9/22/2016     History of tobacco abuse 9/10/2019     HTN, goal below 140/90 1/28/2014     Hyperlipidemia LDL goal <100 10/31/2010     Hypothyroidism 11/9/2011     Hypothyroidism due to acquired atrophy of thyroid 2/18/2016     Ischemic cardiomyopathy      LBP (low back pain) 11/21/2014     Lumbar radiculopathy 9/10/2019     Microalbuminuria 11/7/2016    X1     Midline low back pain with right-sided sciatica 2/25/2021     Morbid obesity (H) 2/25/2021     Muscle spasm 9/25/2017     Other abnormal glucose 5/8/2014     Panlobular emphysema (H) 9/22/2016     Presbycusis syndrome, bilateral 9/22/2016     Right-sided low back pain without sciatica, unspecified chronicity 3/16/2017     S/P lumbar spinal arthrodesis 9/10/2019     Tobacco use disorder 7/19/2006     Tubular adenoma        PAST SURGICAL HISTORY:  Past Surgical History:   Procedure Laterality Date     ANGIOPLASTY  1998     COLONOSCOPY N/A 11/17/2016    Procedure: COLONOSCOPY;  Surgeon: Adam Lantigua MD;  Location:  GI     OPTICAL TRACKING SYSTEM FUSION SPINE POSTERIOR LUMBAR TWO LEVELS N/A 10/28/2019    Procedure: L5 Herrera-Talavera osteotomy and L4-5 bilateral decompressive laminectomy with L5-S1 transforaminal lumbar interbody fusion and  L4-S1 posterior instrumented fusion;  Surgeon: Phillip Beavers MD;  Location:  OR       FAMILY HISTORY:  Family History   Problem Relation Age of Onset     Cardiovascular Father         heart attack     Coronary Artery Disease Father      Cerebrovascular Disease Father      Cardiovascular Brother         heart attack     Cardiovascular Sister         2 sisters - minor cardiac problems- pt unsure of the exact nature       SOCIAL HISTORY:  Social History     Socioeconomic History     Marital  "status:      Highest education level: 12th grade   Tobacco Use     Smoking status: Former Smoker     Packs/day: 0.25     Years: 40.00     Pack years: 10.00     Types: Cigarettes     Smokeless tobacco: Never Used     Tobacco comment: last one was 10/13 (trying to quit)   Substance and Sexual Activity     Alcohol use: No     Drug use: No     Sexual activity: Yes     Partners: Female   Other Topics Concern     Parent/sibling w/ CABG, MI or angioplasty before 65F 55M? Yes       Review of Systems:  Skin:        Eyes:       ENT:       Respiratory:  Negative    Cardiovascular:  Negative    Gastroenterology:      Genitourinary:       Musculoskeletal:       Neurologic:       Psychiatric:       Heme/Lymph/Imm:       Endocrine:         Physical Exam:  Vitals: /68 (BP Location: Right arm, Patient Position: Sitting, Cuff Size: Adult Regular)   Pulse 58   Ht 1.651 m (5' 5\")   Wt 79.3 kg (174 lb 14.4 oz)   SpO2 98%   BMI 29.10 kg/m      Constitutional:  cooperative, alert and oriented, well developed, well nourished, in no acute distress        Skin:  warm and dry to the touch        Head:  normocephalic, no masses or lesions        Eyes:  sclera white;no xanthalasma        ENT:           Neck:  carotid pulses are full and equal bilaterally, JVP normal, no carotid bruit        Chest:  normal breath sounds, clear to auscultation, normal A-P diameter, normal symmetry, normal respiratory excursion, no use of accessory muscles        Cardiac: regular rhythm, normal S1/S2, no S3 or S4, apical impulse not displaced, no murmurs, gallops or rubs                  Abdomen:  abdomen soft;BS normoactive        Vascular: pulses full and equal                                      Extremities and Muscular Skeletal:  no edema           Neurological:  no gross motor deficits        Psych:  affect appropriate, oriented to time, person and place     Recent Lab Results:  LIPID RESULTS:  Lab Results   Component Value Date    CHOL 131 " 03/16/2022    CHOL 139 08/14/2020    HDL 39 (L) 03/16/2022    HDL 33 (L) 08/14/2020    LDL 58 03/16/2022    LDL 67 08/14/2020    TRIG 168 (H) 03/16/2022    TRIG 194 (H) 08/14/2020    CHOLHDLRATIO 3.5 11/21/2014       LIVER ENZYME RESULTS:  Lab Results   Component Value Date    AST 19 03/16/2022    AST 19 08/14/2020    ALT 23 03/16/2022    ALT 17 08/14/2020       CBC RESULTS:  Lab Results   Component Value Date    WBC 7.5 10/10/2019    RBC 4.59 10/10/2019    HGB 14.7 10/10/2019    HCT 44.8 10/10/2019    MCV 98 10/10/2019    MCH 32.0 10/10/2019    MCHC 32.8 10/10/2019    RDW 13.9 10/10/2019     10/10/2019       BMP RESULTS:  Lab Results   Component Value Date     03/16/2022     08/14/2020    POTASSIUM 4.9 03/16/2022    POTASSIUM 5.2 08/14/2020    CHLORIDE 105 03/16/2022    CHLORIDE 104 08/14/2020    CO2 27 03/16/2022    CO2 26 08/14/2020    ANIONGAP 7 03/16/2022    ANIONGAP 7 08/14/2020     (H) 03/16/2022     (H) 08/14/2020    BUN 21 03/16/2022    BUN 22 08/14/2020    CR 1.16 03/16/2022    CR 1.12 08/14/2020    GFRESTIMATED 65 03/16/2022    GFRESTIMATED 63 08/14/2020    GFRESTBLACK 73 08/14/2020    LAKSHMI 9.9 03/16/2022    LAKSHMI 9.6 08/14/2020        A1C RESULTS:  Lab Results   Component Value Date    A1C 7.2 (H) 03/16/2022    A1C 7.6 (H) 02/25/2021       INR RESULTS:  Lab Results   Component Value Date    INR 1.13 10/10/2019         Wayne Quinteros MD, FACC    CC  Ayush Mays MD  [address]  [place] [address]

## 2022-06-07 NOTE — LETTER
6/7/2022    Rigo Goddard [place]  [address] [facility] [address]    RE: Rubio Gibbons       Dear Colleague,     I had the pleasure of seeing Rubio Gibbons in the [facility].  HISTORY:    Rubio Gibbons is a very pleasant 77-year-old gentleman with a history of coronary artery disease.  He suffered a small MI back in 1998 at which time he underwent PTCA without stenting of, I believe, the LAD.  This was done at [facility].  He had presented at that time with classic angina and he recalls those symptoms well.  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    Today Rubio is asked to see cardiology for clearance.  He has developed progressive enlarging AAA, now measuring 5.5 cm and is being considered for either open repair or stenting.  Cardiology consultation was requested to evaluate his cardiovascular risk of these procedures.    On review of the chart I note that he had a nuclear stress test done in October 2019 in anticipation of clearance for back surgery.  At that time his ejection fraction was 54% and he had a small fixed apical defect without inducible ischemia.  This was a Lexiscan study.  He also has recently undergone a carotid ultrasound showing mild disease, less than 50% in the right and 50 to 69% in the left.  I reviewed the CTA of his abdomen which showed a 5.5 mm infrarenal abdominal aortic aneurysm with extensive atherosclerosis of the common femoral arteries and ectasia of both iliacs, both measuring less than 2 cm.    Rubio is an ex-smoker and quit in 2019.  He has a history of hyperlipidemia which has been well controlled with Lipitor as well as a history of hypertension well-controlled with a combination of metoprolol and lisinopril.    Rubio reports that he goes to the gym most days of the week and uses a stationary bike for 10 to 15 km a day.  He also does some upper extremity exercises.  He experiences no  significant fatigue, only mild dyspnea which she contributes to his known mild COPD, and he has not developed chest pain with these vigorous activities.  As stated above, he remembers his angina from 25 years ago well and has never had a recurrence of that discomfort.  He denies thyroid cardiovascular symptoms as well, specifically any other type of exertional chest arm neck shoulder or jaw discomfort any sense of palpitations, syncope or near syncope, PND/orthopnea, peripheral edema, or symptoms of claudication.    Today's ECG is reviewed and shows sinus rhythm without significant or concerning abnormalities.      ASSESSMENT/PLAN:    1.  Coronary artery disease.  The patient remains physically very active without angina.  He does not need further cardiac evaluation at this time.  His cardiovascular risk of open AAA repair is slightly elevated compared to the normal population because of his history of coronary artery disease, but he is currently symptom-free and on appropriate medications to minimize that risk.  He is cleared for surgery without further cardiac evaluation.  2.  Hyperlipidemia.  Lipids are well controlled with atorvastatin, continue same.  3.  Hypertension.  Blood pressure adequately controlled with current medications, continue same.    Thank you for inviting me to participate in the care of your patient.  Please don't hesitate to call if I can be of further assistance.  48 minutes were spent today reviewing the chart and other records, interviewing and examining the patient, and documenting our visit.  This patient has no ongoing cardiovascular issues that require continued cardiology visits.  I will leave future visits open-ended.  I certainly be happy to see him should further cardiology questions arise.    Chart documentation was completed, in part, with [employer] voice-recognition software. Even though reviewed, some grammatical, spelling, and word errors may remain.       Orders Placed This  Encounter   Procedures     EKG 12-lead complete w/read - Clinics (performed today)     No orders of the defined types were placed in this encounter.    There are no discontinued medications.    10 year ASCVD risk: The 10-year ASCVD risk score (Jose JOHNSON Jr., et al., 2013) is: 57.3%    Values used to calculate the score:      Age: 77 years      Sex: Male      Is Non- : No      Diabetic: Yes      Tobacco smoker: No      Systolic Blood Pressure: 136 mmHg      Is BP treated: Yes      HDL Cholesterol: 39 mg/dL      Total Cholesterol: 131 mg/dL    Encounter Diagnosis   Name Primary?     Abdominal aortic aneurysm (AAA) >39 mm diameter (H)        CURRENT MEDICATIONS:  Current Outpatient Medications   Medication Sig Dispense Refill     aspirin (ASPIRIN LOW DOSE) 81 MG EC tablet Take 1 tablet (81 mg) by mouth daily 1 tablet 0     atorvastatin (LIPITOR) 40 MG tablet Take 1 tablet (40 mg) by mouth daily 90 tablet 3     fluticasone-salmeterol (ADVAIR) 250-50 MCG/DOSE inhaler Inhale 1 puff into the lungs every 12 hours 3 each 3     levothyroxine (SYNTHROID/LEVOTHROID) 88 MCG tablet Take 1 tablet (88 mcg) by mouth daily 90 tablet 3     lisinopril (ZESTRIL) 10 MG tablet Take 1 tablet (10 mg) by mouth daily 90 tablet 1     metoprolol succinate ER (TOPROL-XL) 50 MG 24 hr tablet Take 1 tablet (50 mg) by mouth daily 90 tablet 1       ALLERGIES     Allergies   Allergen Reactions     Chantix [Varenicline] GI Disturbance       PAST MEDICAL HISTORY:  Past Medical History:   Diagnosis Date     Abdominal aortic aneurysm (AAA) without rupture (H) 10/13/2016     CAD (coronary artery disease)      Coronary artery disease involving native coronary artery of native heart without angina pectoris 10/14/2019    Per Note. Dr. López OV 2-20-13 - to establish care. He has a history of myocardial infarction back in 1998 @ [facility] . Does not remember any details- and no stent.   Nuclear 3-12-13 LVEF 34% to establish care. He  has a history of myocardial infarction back in 1998, small to moderately sized partially reversible distal anterior anteroseptal and apical defect, small to moderately sized inferio     COVID-19 virus vaccination declined 2/25/2021     Diabetes mellitus type 2, uncomplicated (H) 9/22/2016    Diet      WIN (dyspnea on exertion) 11/21/2014     Heart attack (H) 1998     History of myocardial infarction in adulthood 9/22/2016     History of tobacco abuse 9/10/2019     HTN, goal below 140/90 1/28/2014     Hyperlipidemia LDL goal <100 10/31/2010     Hypothyroidism 11/9/2011     Hypothyroidism due to acquired atrophy of thyroid 2/18/2016     Ischemic cardiomyopathy      LBP (low back pain) 11/21/2014     Lumbar radiculopathy 9/10/2019     Microalbuminuria 11/7/2016    X1     Midline low back pain with right-sided sciatica 2/25/2021     Morbid obesity (H) 2/25/2021     Muscle spasm 9/25/2017     Other abnormal glucose 5/8/2014     Panlobular emphysema (H) 9/22/2016     Presbycusis syndrome, bilateral 9/22/2016     Right-sided low back pain without sciatica, unspecified chronicity 3/16/2017     S/P lumbar spinal arthrodesis 9/10/2019     Tobacco use disorder 7/19/2006     Tubular adenoma        PAST SURGICAL HISTORY:  Past Surgical History:   Procedure Laterality Date     ANGIOPLASTY  1998     COLONOSCOPY N/A 11/17/2016    Procedure: COLONOSCOPY;  Surgeon: Adam Lantigua MD;  Location:  GI     OPTICAL TRACKING SYSTEM FUSION SPINE POSTERIOR LUMBAR TWO LEVELS N/A 10/28/2019    Procedure: L5 Herrera-Talavera osteotomy and L4-5 bilateral decompressive laminectomy with L5-S1 transforaminal lumbar interbody fusion and  L4-S1 posterior instrumented fusion;  Surgeon: Phillip Beavers MD;  Location:  OR       FAMILY HISTORY:  Family History   Problem Relation Age of Onset     Cardiovascular Father         heart attack     Coronary Artery Disease Father      Cerebrovascular Disease Father      Cardiovascular Brother   "       heart attack     Cardiovascular Sister         2 sisters - minor cardiac problems- pt unsure of the exact nature       SOCIAL HISTORY:  Social History     Socioeconomic History     Marital status:      Highest education level: 12th grade   Tobacco Use     Smoking status: Former Smoker     Packs/day: 0.25     Years: 40.00     Pack years: 10.00     Types: Cigarettes     Smokeless tobacco: Never Used     Tobacco comment: last one was 10/13 (trying to quit)   Substance and Sexual Activity     Alcohol use: No     Drug use: No     Sexual activity: Yes     Partners: Female   Other Topics Concern     Parent/sibling w/ CABG, MI or angioplasty before 65F 55M? Yes       Review of Systems:  Skin:        Eyes:       ENT:       Respiratory:  Negative    Cardiovascular:  Negative    Gastroenterology:      Genitourinary:       Musculoskeletal:       Neurologic:       Psychiatric:       Heme/Lymph/Imm:       Endocrine:         Physical Exam:  Vitals: /68 (BP Location: Right arm, Patient Position: Sitting, Cuff Size: Adult Regular)   Pulse 58   Ht 1.651 m (5' 5\")   Wt 79.3 kg (174 lb 14.4 oz)   SpO2 98%   BMI 29.10 kg/m      Constitutional:  cooperative, alert and oriented, well developed, well nourished, in no acute distress        Skin:  warm and dry to the touch        Head:  normocephalic, no masses or lesions        Eyes:  sclera white;no xanthalasma        ENT:           Neck:  carotid pulses are full and equal bilaterally, JVP normal, no carotid bruit        Chest:  normal breath sounds, clear to auscultation, normal A-P diameter, normal symmetry, normal respiratory excursion, no use of accessory muscles        Cardiac: regular rhythm, normal S1/S2, no S3 or S4, apical impulse not displaced, no murmurs, gallops or rubs                  Abdomen:  abdomen soft;BS normoactive        Vascular: pulses full and equal                                      Extremities and Muscular Skeletal:  no edema       "     Neurological:  no gross motor deficits        Psych:  affect appropriate, oriented to time, person and place     Recent Lab Results:  LIPID RESULTS:  Lab Results   Component Value Date    CHOL 131 03/16/2022    CHOL 139 08/14/2020    HDL 39 (L) 03/16/2022    HDL 33 (L) 08/14/2020    LDL 58 03/16/2022    LDL 67 08/14/2020    TRIG 168 (H) 03/16/2022    TRIG 194 (H) 08/14/2020    CHOLHDLRATIO 3.5 11/21/2014       LIVER ENZYME RESULTS:  Lab Results   Component Value Date    AST 19 03/16/2022    AST 19 08/14/2020    ALT 23 03/16/2022    ALT 17 08/14/2020       CBC RESULTS:  Lab Results   Component Value Date    WBC 7.5 10/10/2019    RBC 4.59 10/10/2019    HGB 14.7 10/10/2019    HCT 44.8 10/10/2019    MCV 98 10/10/2019    MCH 32.0 10/10/2019    MCHC 32.8 10/10/2019    RDW 13.9 10/10/2019     10/10/2019       BMP RESULTS:  Lab Results   Component Value Date     03/16/2022     08/14/2020    POTASSIUM 4.9 03/16/2022    POTASSIUM 5.2 08/14/2020    CHLORIDE 105 03/16/2022    CHLORIDE 104 08/14/2020    CO2 27 03/16/2022    CO2 26 08/14/2020    ANIONGAP 7 03/16/2022    ANIONGAP 7 08/14/2020     (H) 03/16/2022     (H) 08/14/2020    BUN 21 03/16/2022    BUN 22 08/14/2020    CR 1.16 03/16/2022    CR 1.12 08/14/2020    GFRESTIMATED 65 03/16/2022    GFRESTIMATED 63 08/14/2020    GFRESTBLACK 73 08/14/2020    LAKSHMI 9.9 03/16/2022    LAKSHMI 9.6 08/14/2020        A1C RESULTS:  Lab Results   Component Value Date    A1C 7.2 (H) 03/16/2022    A1C 7.6 (H) 02/25/2021       INR RESULTS:  Lab Results   Component Value Date    INR 1.13 10/10/2019         Wayne Quinteros MD, [facility]    CC  Ayush Mays MD  [address] [address]    Thank you for allowing me to participate in the care of your patient.      Sincerely,     Wayne Quinteros MD     [facility]

## 2022-06-21 ENCOUNTER — OFFICE VISIT (OUTPATIENT)
Dept: OTHER | Facility: CLINIC | Age: 77
End: 2022-06-21
Attending: SURGERY
Payer: COMMERCIAL

## 2022-06-21 VITALS — HEART RATE: 57 BPM | SYSTOLIC BLOOD PRESSURE: 149 MMHG | DIASTOLIC BLOOD PRESSURE: 82 MMHG

## 2022-06-21 DIAGNOSIS — I71.40 ABDOMINAL AORTIC ANEURYSM (AAA) >39 MM DIAMETER (H): ICD-10-CM

## 2022-06-21 PROCEDURE — G0463 HOSPITAL OUTPT CLINIC VISIT: HCPCS

## 2022-06-21 PROCEDURE — 99215 OFFICE O/P EST HI 40 MIN: CPT | Performed by: SURGERY

## 2022-06-21 NOTE — NURSING NOTE
Patient Education    Procedure: ENDOVASCULAR ABDOMINAL AORTIC ANEURYSM REPAIR WITH MEDTRONIC GRAFT  Diagnosis: AAA  Anticoagulation Instruction: CONTINUE ASA  Pre-Operative Physical Exam: You need to have a pre-op physical exam within 30 days of your procedure. Your procedure may be cancelled if you do not have a current History and Physical. Call your PCP's office to schedule.  Allergies:  Updated in Epic  Bowel Prep: MAGNESIUM CITRATE, 24 HOUR CLEAR LIQUIDS  NPO per protocol.   Post Procedure Education: Vascular [facility] patient post-procedure fact sheet reviewed with patient.    COVID-19 testing for AM Admit procedures:    2-4 days before your procedure, please get a PCR test from a lab. To schedule a PCR test with [facility], call [phone]. Or, visit [email or web address].   Please ask the testing location to fax your results to us at [phone].     Once COVID test is obtained, pt to isolate to reduce risk of exposure up to date of surgery.    Showering instructions reviewed: Yes    Learner(s):patient and spouse  Method: Listening, Reading  Barriers to Learning:No Barrier  Outcome: Patient did verbalize understanding of above education.    Kathleen Reynoso, CARLOS ALBERTON, RN-BC  [facility] [place]

## 2022-06-21 NOTE — PROGRESS NOTES
Rubio Gibbons is an active 77-year-old gentleman with a 5.5 cm infrarenal AAA.  Please see my prior office note from 4/6/2022 and 4/20/2022.  He has subsequently undergone cardiac clearance.  He presents today with his wife to discuss treatment options for his AAA.    I did not repeat a physical examination today.  He has a BMI of 29 with easily palpable pedal pulses bilaterally.    We reviewed the images from his CTA of 4/6/2022.    ASSESSMENT:  77-year-old gentleman with 5.5 cm infrarenal AAA with bilateral common iliac artery ectasia.  There are some anatomic features of his infrarenal aortic neck that make EVAR a bit more challenging.  He does have some moderate rectus diastases in the upper abdomen and I believe is likely at an increased risk for ultimate development of an incisional hernia.    RECOMMENDATION:  I reviewed all the above with Rubio and his wife.  Anatomically he would be a candidate for open repair.  I discussed the specifics of this procedure including potential complications and the anticipated postoperative course.  I discussed the specifics of EVAR including my concerns regarding his infrarenal aortic neck anatomy.    After a thorough and thoughtful conversation regarding the risks and/or benefits of open versus endovascular approach, Rubio prefers EVAR.  He received patient education.  He understands the need for a preoperative history and physical and a negative COVID-19 test.  EVAR utilizing a Medtronic graft will be scheduled in a timely manner.  I may consider left brachial access and prophylactic placement of a balloon in the left renal artery to protect that structure from potential embolic events.  All of his questions were answered and he verbalizes full understanding to the above and complete agreement with this management plan.    Total length of this encounter was 40 minutes.    Kornad Mays MD

## 2022-06-21 NOTE — PROGRESS NOTES
[facility]        Patient is here for a  follow up.      Pt is currently taking Aspirin and Statin.    BP (!) 149/82 (BP Location: Left arm, Patient Position: Chair, Cuff Size: Adult Regular)   Pulse 57     The provider has been notified that the patient has no concerns.     Questions patient would like addressed today are: N/A.    Refills are needed: N/A    Has homecare services and agency name:  Tisha Pate MA

## 2022-06-22 ENCOUNTER — TELEPHONE (OUTPATIENT)
Dept: OTHER | Facility: CLINIC | Age: 77
End: 2022-06-22

## 2022-06-22 NOTE — TELEPHONE ENCOUNTER
Case received for ENDOVASCULAR ABDOMINAL AORTIC ANEURYSM REPAIR WITH MEDTRONIC GRAFT.     CaseID: [id number]      Edith Eden    [facility]   [phone]

## 2022-06-23 NOTE — TELEPHONE ENCOUNTER
Spoke with Rubio and he would like to have surgery anytime in July.  Would prefer sooner rather than later.  No dates to avoid.  Rubio is aware we will begin working on finding a date and will call in the next few days with a date/time.

## 2022-06-27 ENCOUNTER — DOCUMENTATION ONLY (OUTPATIENT)
Dept: LAB | Facility: CLINIC | Age: 77
End: 2022-06-27

## 2022-06-27 NOTE — PROGRESS NOTES
This patient is coming to lab July 1st for his pre procedure Covid swab. Please place an order, thank you.

## 2022-06-29 NOTE — TELEPHONE ENCOUNTER
Type of surgery: DR. DUPONT TO ASSIT WITH ENDOVASCULAR REPAIR ABDOMINAL AORTIC ANEURYSM WITH MEDTRONIC GRAFT  Location of surgery: [facility]  Date and time of surgery: 7/5/22 at 11:00am  Surgeon: Dr. Mays  Pre-Op Appt Date: 7/1/22  Post-Op Appt Date: 7/20/22   Packet sent out: Yes, given   Pre-cert/Authorization completed:  Not Applicable  Date: 6/29/22      Edith Eden    [facility]   [phone]

## 2022-06-30 ENCOUNTER — OFFICE VISIT (OUTPATIENT)
Dept: FAMILY MEDICINE | Facility: CLINIC | Age: 77
End: 2022-06-30
Payer: COMMERCIAL

## 2022-06-30 VITALS
BODY MASS INDEX: 28.91 KG/M2 | DIASTOLIC BLOOD PRESSURE: 66 MMHG | OXYGEN SATURATION: 95 % | RESPIRATION RATE: 16 BRPM | WEIGHT: 173.7 LBS | TEMPERATURE: 98.6 F | HEART RATE: 60 BPM | SYSTOLIC BLOOD PRESSURE: 104 MMHG

## 2022-06-30 DIAGNOSIS — E11.9 TYPE 2 DIABETES MELLITUS WITHOUT COMPLICATION, WITHOUT LONG-TERM CURRENT USE OF INSULIN (H): ICD-10-CM

## 2022-06-30 DIAGNOSIS — I71.40 ABDOMINAL AORTIC ANEURYSM (AAA) >39 MM DIAMETER (H): ICD-10-CM

## 2022-06-30 DIAGNOSIS — Z01.818 PREOP GENERAL PHYSICAL EXAM: Primary | ICD-10-CM

## 2022-06-30 LAB
BASOPHILS # BLD AUTO: 0 10E3/UL (ref 0–0.2)
BASOPHILS NFR BLD AUTO: 0 %
EOSINOPHIL # BLD AUTO: 0.1 10E3/UL (ref 0–0.7)
EOSINOPHIL NFR BLD AUTO: 1 %
ERYTHROCYTE [DISTWIDTH] IN BLOOD BY AUTOMATED COUNT: 13.9 % (ref 10–15)
HCT VFR BLD AUTO: 41.7 % (ref 40–53)
HGB BLD-MCNC: 13.6 G/DL (ref 13.3–17.7)
LYMPHOCYTES # BLD AUTO: 2.2 10E3/UL (ref 0.8–5.3)
LYMPHOCYTES NFR BLD AUTO: 24 %
MCH RBC QN AUTO: 31.6 PG (ref 26.5–33)
MCHC RBC AUTO-ENTMCNC: 32.6 G/DL (ref 31.5–36.5)
MCV RBC AUTO: 97 FL (ref 78–100)
MONOCYTES # BLD AUTO: 0.7 10E3/UL (ref 0–1.3)
MONOCYTES NFR BLD AUTO: 7 %
NEUTROPHILS # BLD AUTO: 6.2 10E3/UL (ref 1.6–8.3)
NEUTROPHILS NFR BLD AUTO: 67 %
PLATELET # BLD AUTO: 188 10E3/UL (ref 150–450)
RBC # BLD AUTO: 4.31 10E6/UL (ref 4.4–5.9)
WBC # BLD AUTO: 9.3 10E3/UL (ref 4–11)

## 2022-06-30 PROCEDURE — 93000 ELECTROCARDIOGRAM COMPLETE: CPT | Performed by: FAMILY MEDICINE

## 2022-06-30 PROCEDURE — 99214 OFFICE O/P EST MOD 30 MIN: CPT | Performed by: FAMILY MEDICINE

## 2022-06-30 PROCEDURE — 80048 BASIC METABOLIC PNL TOTAL CA: CPT | Performed by: FAMILY MEDICINE

## 2022-06-30 PROCEDURE — 85025 COMPLETE CBC W/AUTO DIFF WBC: CPT | Performed by: FAMILY MEDICINE

## 2022-06-30 PROCEDURE — 83036 HEMOGLOBIN GLYCOSYLATED A1C: CPT | Performed by: FAMILY MEDICINE

## 2022-06-30 PROCEDURE — 36415 COLL VENOUS BLD VENIPUNCTURE: CPT | Performed by: FAMILY MEDICINE

## 2022-06-30 NOTE — PROGRESS NOTES
[facility]  [id number] [place] [id number]  Phone: [phone]  Primary Provider: Rigo Goddard        PREOPERATIVE EVALUATION:  Today's date: 6/30/2022    Rubio Gibbons is a 77 year old male who presents for a preoperative evaluation.    Surgical Information:  Surgery/Procedure:Endovascular Repair Abdominal Aortic Aneurysm with Medtronic graft  Surgery Location: [facility]  Surgeon: Ayush Mays MD  Surgery Date: 07/05/2022  Time of Surgery: 11am  Where patient plans to recover: At home with family  Fax number for surgical facility: Note does not need to be faxed, will be available electronically in Epic.    Type of Anesthesia Anticipated: to be determined    Assessment & Plan     The proposed surgical procedure is considered INTERMEDIATE risk.    Problem List Items Addressed This Visit     Abdominal aortic aneurysm (AAA) >39 mm diameter (H)     With iliac ectasia.  Vascular surgery notes reviewed.  Catheter deployed aortic graft is planned             Other Visit Diagnoses     Preop general physical exam    -  Primary    Relevant Orders    EKG 12-lead complete w/read - Clinics (Completed)    Basic metabolic panel  (Ca, Cl, CO2, Creat, Gluc, K, Na, BUN)    CBC with platelets and differential (Completed)                   Patient has been advised that his current low-dose aspirin may be continued by vascular surgery.  Per [place] guidelines other medications will be taken a.m. of surgery including his inhaler    RECOMMENDATION:  Patient has recently had surgical clearance by cardiology.  Notes reviewed  APPROVAL GIVEN to proceed with proposed procedure, without further diagnostic evaluation.    Review of external notes as documented above                   Subjective     HPI related to upcoming procedure: Expanding abdominal aortic aneurysm    Preop Questions 6/30/2022   1. Have you ever had a heart attack or stroke? YES -    2. Have you  ever had surgery on your heart or blood vessels, such as a stent placement, a coronary artery bypass, or surgery on an artery in your head, neck, heart, or legs? YES -    3. Do you have chest pain with activity? No   4. Do you have a history of  heart failure? No   5. Do you currently have a cold, bronchitis or symptoms of other infection? No   6. Do you have a cough, shortness of breath, or wheezing? YES -    7. Do you or anyone in your family have previous history of blood clots? No   8. Do you or does anyone in your family have a serious bleeding problem such as prolonged bleeding following surgeries or cuts? No   9. Have you ever had problems with anemia or been told to take iron pills? No   10. Have you had any abnormal blood loss such as black, tarry or bloody stools? No   11. Have you ever had a blood transfusion? No   12. Are you willing to have a blood transfusion if it is medically needed before, during, or after your surgery? Yes   13. Have you or any of your relatives ever had problems with anesthesia? No   14. Do you have sleep apnea, excessive snoring or daytime drowsiness? No   15. Do you have any artifical heart valves or other implanted medical devices like a pacemaker, defibrillator, or continuous glucose monitor? No   16. Do you have artificial joints? No   17. Are you allergic to latex? No       Health Care Directive:  Patient does not have a Health Care Directive or Living Will:     Preoperative Review of :   reviewed - no record of controlled substances prescribed.      Past Medical History:   Diagnosis Date     Abdominal aortic aneurysm (AAA) without rupture (H) 10/13/2016     CAD (coronary artery disease)      Coronary artery disease involving native coronary artery of native heart without angina pectoris 10/14/2019    Per Note. Dr. López OV 2-20-13 - to establish care. He has a history of myocardial infarction back in 1998 @ [facility] . Does not remember any details- and no stent.    Nuclear 3-12-13 LVEF 34% to establish care. He has a history of myocardial infarction back in 1998, small to moderately sized partially reversible distal anterior anteroseptal and apical defect, small to moderately sized inferio     COVID-19 virus vaccination declined 2/25/2021     Diabetes mellitus type 2, uncomplicated (H) 9/22/2016    Diet      WIN (dyspnea on exertion) 11/21/2014     Heart attack (H) 1998     History of myocardial infarction in adulthood 9/22/2016     History of tobacco abuse 9/10/2019     HTN, goal below 140/90 1/28/2014     Hyperlipidemia LDL goal <100 10/31/2010     Hypothyroidism 11/9/2011     Hypothyroidism due to acquired atrophy of thyroid 2/18/2016     Ischemic cardiomyopathy      LBP (low back pain) 11/21/2014     Lumbar radiculopathy 9/10/2019     Microalbuminuria 11/7/2016    X1     Midline low back pain with right-sided sciatica 2/25/2021     Morbid obesity (H) 2/25/2021     Muscle spasm 9/25/2017     Other abnormal glucose 5/8/2014     Panlobular emphysema (H) 9/22/2016     Presbycusis syndrome, bilateral 9/22/2016     Right-sided low back pain without sciatica, unspecified chronicity 3/16/2017     S/P lumbar spinal arthrodesis 9/10/2019     Tobacco use disorder 7/19/2006     Tubular adenoma        Past Surgical History:   Procedure Laterality Date     ANGIOPLASTY  1998     COLONOSCOPY N/A 11/17/2016    Procedure: COLONOSCOPY;  Surgeon: Adam Lantigua MD;  Location:  GI     OPTICAL TRACKING SYSTEM FUSION SPINE POSTERIOR LUMBAR TWO LEVELS N/A 10/28/2019    Procedure: L5 Herrera-Talavera osteotomy and L4-5 bilateral decompressive laminectomy with L5-S1 transforaminal lumbar interbody fusion and  L4-S1 posterior instrumented fusion;  Surgeon: Phillip Beavers MD;  Location:  OR       Family History   Problem Relation Age of Onset     Cardiovascular Father         heart attack     Coronary Artery Disease Father      Cerebrovascular Disease Father      Cardiovascular  Brother         heart attack     Cardiovascular Sister         2 sisters - minor cardiac problems- pt unsure of the exact nature       Social History     Tobacco Use     Smoking status: Former Smoker     Packs/day: 0.25     Years: 40.00     Pack years: 10.00     Types: Cigarettes     Smokeless tobacco: Never Used     Tobacco comment: last one was 10/13 (trying to quit)   Substance Use Topics     Alcohol use: No     Current Outpatient Medications   Medication     aspirin (ASPIRIN LOW DOSE) 81 MG EC tablet     atorvastatin (LIPITOR) 40 MG tablet     fluticasone-salmeterol (ADVAIR) 250-50 MCG/DOSE inhaler     levothyroxine (SYNTHROID/LEVOTHROID) 88 MCG tablet     lisinopril (ZESTRIL) 10 MG tablet     metoprolol succinate ER (TOPROL-XL) 50 MG 24 hr tablet     No current facility-administered medications for this visit.         Review of Systems  CONSTITUTIONAL: NEGATIVE for fever, chills, change in weight  ENT/MOUTH: NEGATIVE for ear, mouth and throat problems  RESP: Stable exertional dyspnea.  Rare cough.  At baseline  CV: NEGATIVE for chest pain, palpitations or peripheral edema  HEME/ALLERGY/IMMUNE: NEGATIVE for bleeding problems    Patient Active Problem List    Diagnosis Date Noted     Cough 03/16/2022     Priority: Medium     Midline low back pain with right-sided sciatica 02/25/2021     Priority: Medium     Inflamed seborrheic keratosis 02/25/2021     Priority: Medium     Need for hepatitis C screening test 02/25/2021     Priority: Medium     S/P cervical spinal fusion 10/28/2019     Priority: Medium     Coronary artery disease involving native coronary artery of native heart without angina pectoris 10/14/2019     Priority: Medium     Per Note. Dr. López OV 2-20-13 - to establish care. He has a history of myocardial infarction back in 1998 @ [facility] . Does not remember any details- and no stent.     Nuclear 3-12-13 LVEF 34% to establish care. He has a history of myocardial infarction back in 1998, small to  moderately sized partially reversible distal anterior anteroseptal and apical defect, small to moderately sized inferior and inferoseptal defect       DDD (degenerative disc disease), lumbar 10/10/2019     Priority: Medium     S/P lumbar spinal arthrodesis 09/10/2019     Priority: Medium     Personal history of tobacco use 09/10/2019     Priority: Medium     Gait disturbance 08/03/2018     Priority: Medium     Muscle spasm 09/25/2017     Priority: Medium     Microalbuminuria 11/07/2016     Priority: Medium     X1       Abdominal aortic aneurysm (AAA) >39 mm diameter (H) 10/13/2016     Priority: Medium     Stable at last measure.        Panlobular emphysema (H) 09/22/2016     Priority: Medium     History of myocardial infarction in adulthood 09/22/2016     Priority: Medium     Presbycusis syndrome, bilateral 09/22/2016     Priority: Medium     Diabetes mellitus type 2, uncomplicated (H) 09/22/2016     Priority: Medium     Diet         Hypothyroidism due to acquired atrophy of thyroid 02/18/2016     Priority: Medium     WIN (dyspnea on exertion) 11/21/2014     Priority: Medium     Essential hypertension with goal blood pressure less than 140/90 01/28/2014     Priority: Medium     Advanced directives, counseling/discussion 01/30/2013     Priority: Medium     Discussed advance care planning with patient; however, patient declined at this time. 1/30/2013          HYPERLIPIDEMIA LDL GOAL <100 10/31/2010     Priority: Medium      Past Medical History:   Diagnosis Date     Abdominal aortic aneurysm (AAA) without rupture (H) 10/13/2016     CAD (coronary artery disease)      Coronary artery disease involving native coronary artery of native heart without angina pectoris 10/14/2019    Per Note. Dr. López OV 2-20-13 - to establish care. He has a history of myocardial infarction back in 1998 @ [facility] . Does not remember any details- and no stent.   Nuclear 3-12-13 LVEF 34% to establish care. He has a history of myocardial  infarction back in 1998, small to moderately sized partially reversible distal anterior anteroseptal and apical defect, small to moderately sized inferio     COVID-19 virus vaccination declined 2/25/2021     Diabetes mellitus type 2, uncomplicated (H) 9/22/2016    Diet      WIN (dyspnea on exertion) 11/21/2014     Heart attack (H) 1998     History of myocardial infarction in adulthood 9/22/2016     History of tobacco abuse 9/10/2019     HTN, goal below 140/90 1/28/2014     Hyperlipidemia LDL goal <100 10/31/2010     Hypothyroidism 11/9/2011     Hypothyroidism due to acquired atrophy of thyroid 2/18/2016     Ischemic cardiomyopathy      LBP (low back pain) 11/21/2014     Lumbar radiculopathy 9/10/2019     Microalbuminuria 11/7/2016    X1     Midline low back pain with right-sided sciatica 2/25/2021     Morbid obesity (H) 2/25/2021     Muscle spasm 9/25/2017     Other abnormal glucose 5/8/2014     Panlobular emphysema (H) 9/22/2016     Presbycusis syndrome, bilateral 9/22/2016     Right-sided low back pain without sciatica, unspecified chronicity 3/16/2017     S/P lumbar spinal arthrodesis 9/10/2019     Tobacco use disorder 7/19/2006     Tubular adenoma      Past Surgical History:   Procedure Laterality Date     ANGIOPLASTY  1998     COLONOSCOPY N/A 11/17/2016    Procedure: COLONOSCOPY;  Surgeon: Adam Lantigua MD;  Location:  GI     OPTICAL TRACKING SYSTEM FUSION SPINE POSTERIOR LUMBAR TWO LEVELS N/A 10/28/2019    Procedure: L5 Herrera-Talavera osteotomy and L4-5 bilateral decompressive laminectomy with L5-S1 transforaminal lumbar interbody fusion and  L4-S1 posterior instrumented fusion;  Surgeon: Phillip Beavers MD;  Location:  OR     Current Outpatient Medications   Medication Sig Dispense Refill     aspirin (ASPIRIN LOW DOSE) 81 MG EC tablet Take 1 tablet (81 mg) by mouth daily 1 tablet 0     atorvastatin (LIPITOR) 40 MG tablet Take 1 tablet (40 mg) by mouth daily 90 tablet 3      fluticasone-salmeterol (ADVAIR) 250-50 MCG/DOSE inhaler Inhale 1 puff into the lungs every 12 hours 3 each 3     levothyroxine (SYNTHROID/LEVOTHROID) 88 MCG tablet Take 1 tablet (88 mcg) by mouth daily 90 tablet 3     lisinopril (ZESTRIL) 10 MG tablet Take 1 tablet (10 mg) by mouth daily 90 tablet 1     metoprolol succinate ER (TOPROL-XL) 50 MG 24 hr tablet Take 1 tablet (50 mg) by mouth daily 90 tablet 1       Allergies   Allergen Reactions     Chantix [Varenicline] GI Disturbance        Social History     Tobacco Use     Smoking status: Former Smoker     Packs/day: 0.25     Years: 40.00     Pack years: 10.00     Types: Cigarettes     Smokeless tobacco: Never Used     Tobacco comment: last one was 10/13 (trying to quit)   Substance Use Topics     Alcohol use: No       History   Drug Use No         Objective     /66 (BP Location: Right arm, Patient Position: Sitting, Cuff Size: Adult Regular)   Pulse 60   Temp 98.6  F (37  C) (Oral)   Resp 16   Wt 78.8 kg (173 lb 11.2 oz)   SpO2 95%   BMI 28.91 kg/m      Physical Exam  Constitutional:       Appearance: Normal appearance.   HENT:      Head: Atraumatic.      Right Ear: Tympanic membrane normal.      Left Ear: Tympanic membrane normal.      Nose: Nose normal.      Mouth/Throat:      Mouth: Mucous membranes are moist.   Eyes:      Pupils: Pupils are equal, round, and reactive to light.   Cardiovascular:      Rate and Rhythm: Normal rate and regular rhythm.      Heart sounds: Normal heart sounds.   Pulmonary:      Effort: Pulmonary effort is normal.      Breath sounds: Normal breath sounds.   Abdominal:      General: Abdomen is flat. Bowel sounds are normal.   Musculoskeletal:      Cervical back: Neck supple.   Skin:     General: Skin is warm and dry.   Neurological:      General: No focal deficit present.      Mental Status: He is alert.   Psychiatric:         Mood and Affect: Mood normal.           Recent Labs   Lab Test 03/16/22  1547 02/25/21  0923  08/14/20  0909     --  137   POTASSIUM 4.9  --  5.2   CR 1.16  --  1.12   A1C 7.2* 7.6* 7.2*        Diagnostics:  Labs pending at this time.  Results will be reviewed when available.   EKG: Normal Sinus Rhythm, Incomplete left bundle branch block axis has shifted leftward, unchanged from previous tracings    Revised Cardiac Risk Index (RCRI):  The patient has the following serious cardiovascular risks for perioperative complications:   - Coronary Artery Disease (MI, positive stress test, angina, Qs on EKG) = 1 point     RCRI Interpretation: 1 point: Class II (low risk - 0.9% complication rate)    Risk calculation will increase if open procedure is undertaken         Signed Electronically by: Rigo Goddard MD  Copy of this evaluation report is provided to requesting physician.

## 2022-07-01 ENCOUNTER — LAB (OUTPATIENT)
Dept: LAB | Facility: CLINIC | Age: 77
End: 2022-07-01
Payer: COMMERCIAL

## 2022-07-01 DIAGNOSIS — Z11.59 SPECIAL SCREENING EXAMINATION FOR VIRAL DISEASE: Primary | ICD-10-CM

## 2022-07-01 LAB
ANION GAP SERPL CALCULATED.3IONS-SCNC: 6 MMOL/L (ref 3–14)
BUN SERPL-MCNC: 21 MG/DL (ref 7–30)
CALCIUM SERPL-MCNC: 9.1 MG/DL (ref 8.5–10.1)
CHLORIDE BLD-SCNC: 106 MMOL/L (ref 94–109)
CO2 SERPL-SCNC: 26 MMOL/L (ref 20–32)
CREAT SERPL-MCNC: 1.14 MG/DL (ref 0.66–1.25)
GFR SERPL CREATININE-BSD FRML MDRD: 66 ML/MIN/1.73M2
GLUCOSE BLD-MCNC: 131 MG/DL (ref 70–99)
HBA1C MFR BLD: 7.1 % (ref 0–5.6)
POTASSIUM BLD-SCNC: 5.2 MMOL/L (ref 3.4–5.3)
SARS-COV-2 RNA RESP QL NAA+PROBE: NEGATIVE
SODIUM SERPL-SCNC: 138 MMOL/L (ref 133–144)

## 2022-07-01 PROCEDURE — U0005 INFEC AGEN DETEC AMPLI PROBE: HCPCS

## 2022-07-01 PROCEDURE — U0003 INFECTIOUS AGENT DETECTION BY NUCLEIC ACID (DNA OR RNA); SEVERE ACUTE RESPIRATORY SYNDROME CORONAVIRUS 2 (SARS-COV-2) (CORONAVIRUS DISEASE [COVID-19]), AMPLIFIED PROBE TECHNIQUE, MAKING USE OF HIGH THROUGHPUT TECHNOLOGIES AS DESCRIBED BY CMS-2020-01-R: HCPCS

## 2022-07-01 NOTE — ASSESSMENT & PLAN NOTE
Diet controlled previously last A1c still acceptable.  Will not initiate additional medicines at this time

## 2022-07-01 NOTE — PROGRESS NOTES
PTA medications updated by Medication Scribe prior to surgery via phone call with patient (last doses completed by Nurse)     Medication history sources: Patient, Surescripts, H&P and Patient's home med list  In the past week, patient estimated taking medication this percent of the time: Greater than 90%  Adherence assessment: N/A Not Observed    Significant changes made to the medication list:  None      Additional medication history information:   Patient was advised to bring: ADVAIR 250/50MCG INH    Medication reconciliation completed by provider prior to medication history? No    Time spent in this activity: 30 MINUTES    The information provided in this note is only as accurate as the sources available at the time of update(s)      Prior to Admission medications    Medication Sig Last Dose Taking? Auth Provider Long Term End Date   Ascorbic Acid (VITAMIN C GUMMIES) 125 MG CHEW Take 250 mg by mouth daily (125MG X 2 = 250MG)  at AM Yes Reported, Patient     aspirin (ASPIRIN LOW DOSE) 81 MG EC tablet Take 1 tablet (81 mg) by mouth daily  at AM Yes Bibi Mason, APRN CNP Yes    atorvastatin (LIPITOR) 40 MG tablet Take 1 tablet (40 mg) by mouth daily  at AM Yes Rigo Goddard MD Yes    fluticasone-salmeterol (ADVAIR) 250-50 MCG/DOSE inhaler Inhale 1 puff into the lungs every 12 hours  at AM Yes Rigo Goddard MD Yes    levothyroxine (SYNTHROID/LEVOTHROID) 88 MCG tablet Take 1 tablet (88 mcg) by mouth daily  at AM Yes Rigo Goddard MD Yes    lisinopril (ZESTRIL) 10 MG tablet Take 1 tablet (10 mg) by mouth daily  at AM Yes Rigo Goddard MD Yes    metoprolol succinate ER (TOPROL-XL) 50 MG 24 hr tablet Take 1 tablet (50 mg) by mouth daily  at AM Yes Rigo Goddard MD Yes

## 2022-07-04 ENCOUNTER — ANESTHESIA EVENT (OUTPATIENT)
Dept: SURGERY | Facility: CLINIC | Age: 77
DRG: 269 | End: 2022-07-04
Payer: COMMERCIAL

## 2022-07-04 ASSESSMENT — COPD QUESTIONNAIRES: COPD: 1

## 2022-07-04 ASSESSMENT — LIFESTYLE VARIABLES: TOBACCO_USE: 1

## 2022-07-05 ENCOUNTER — APPOINTMENT (OUTPATIENT)
Dept: INTERVENTIONAL RADIOLOGY/VASCULAR | Facility: CLINIC | Age: 77
DRG: 269 | End: 2022-07-05
Attending: SURGERY
Payer: COMMERCIAL

## 2022-07-05 ENCOUNTER — APPOINTMENT (OUTPATIENT)
Dept: GENERAL RADIOLOGY | Facility: CLINIC | Age: 77
DRG: 269 | End: 2022-07-05
Attending: SURGERY
Payer: COMMERCIAL

## 2022-07-05 ENCOUNTER — HOSPITAL ENCOUNTER (INPATIENT)
Facility: CLINIC | Age: 77
LOS: 1 days | Discharge: HOME | DRG: 269 | End: 2022-07-06
Attending: SURGERY | Admitting: INTERNAL MEDICINE
Payer: COMMERCIAL

## 2022-07-05 ENCOUNTER — ANESTHESIA (OUTPATIENT)
Dept: SURGERY | Facility: CLINIC | Age: 77
DRG: 269 | End: 2022-07-05
Payer: COMMERCIAL

## 2022-07-05 DIAGNOSIS — I71.40 ABDOMINAL AORTIC ANEURYSM (AAA) >39 MM DIAMETER (H): ICD-10-CM

## 2022-07-05 DIAGNOSIS — Z79.2 NEED FOR PROPHYLACTIC ANTIBIOTIC: Primary | ICD-10-CM

## 2022-07-05 LAB
ABO/RH(D): NORMAL
ACT BLD: 263 SECONDS (ref 74–150)
ACT BLD: 292 SECONDS (ref 74–150)
ANTIBODY SCREEN: NEGATIVE
BLD PROD TYP BPU: NORMAL
BLD PROD TYP BPU: NORMAL
BLOOD COMPONENT TYPE: NORMAL
BLOOD COMPONENT TYPE: NORMAL
CODING SYSTEM: NORMAL
CODING SYSTEM: NORMAL
CREAT SERPL-MCNC: 1.2 MG/DL (ref 0.66–1.25)
CREAT SERPL-MCNC: 1.22 MG/DL (ref 0.66–1.25)
CROSSMATCH: NORMAL
CROSSMATCH: NORMAL
GFR SERPL CREATININE-BSD FRML MDRD: 61 ML/MIN/1.73M2
GFR SERPL CREATININE-BSD FRML MDRD: 62 ML/MIN/1.73M2
GLUCOSE BLD-MCNC: 157 MG/DL (ref 70–99)
HBA1C MFR BLD: 6.9 % (ref 0–5.6)
HGB BLD-MCNC: 12.5 G/DL (ref 13.3–17.7)
POTASSIUM BLD-SCNC: 4.5 MMOL/L (ref 3.4–5.3)
POTASSIUM BLD-SCNC: 4.9 MMOL/L (ref 3.4–5.3)
SPECIMEN EXPIRATION DATE: NORMAL
UNIT ABO/RH: NORMAL
UNIT ABO/RH: NORMAL
UNIT NUMBER: NORMAL
UNIT NUMBER: NORMAL
UNIT STATUS: NORMAL
UNIT STATUS: NORMAL
UNIT TYPE ISBT: 5100
UNIT TYPE ISBT: 5100

## 2022-07-05 PROCEDURE — 82565 ASSAY OF CREATININE: CPT | Performed by: PHYSICIAN ASSISTANT

## 2022-07-05 PROCEDURE — 272N000001 HC OR GENERAL SUPPLY STERILE: Performed by: SURGERY

## 2022-07-05 PROCEDURE — 84132 ASSAY OF SERUM POTASSIUM: CPT | Performed by: PHYSICIAN ASSISTANT

## 2022-07-05 PROCEDURE — 258N000003 HC RX IP 258 OP 636: Performed by: NURSE ANESTHETIST, CERTIFIED REGISTERED

## 2022-07-05 PROCEDURE — C1887 CATHETER, GUIDING: HCPCS | Performed by: SURGERY

## 2022-07-05 PROCEDURE — 82947 ASSAY GLUCOSE BLOOD QUANT: CPT | Performed by: ANESTHESIOLOGY

## 2022-07-05 PROCEDURE — 360N000078 HC SURGERY LEVEL 5, PER MIN: Performed by: SURGERY

## 2022-07-05 PROCEDURE — 85018 HEMOGLOBIN: CPT | Performed by: PHYSICIAN ASSISTANT

## 2022-07-05 PROCEDURE — 85347 COAGULATION TIME ACTIVATED: CPT

## 2022-07-05 PROCEDURE — 36415 COLL VENOUS BLD VENIPUNCTURE: CPT | Performed by: PHYSICIAN ASSISTANT

## 2022-07-05 PROCEDURE — 04V03DZ RESTRICTION OF ABDOMINAL AORTA WITH INTRALUMINAL DEVICE, PERCUTANEOUS APPROACH: ICD-10-PCS | Performed by: RADIOLOGY

## 2022-07-05 PROCEDURE — 86850 RBC ANTIBODY SCREEN: CPT | Performed by: SURGERY

## 2022-07-05 PROCEDURE — 250N000011 HC RX IP 250 OP 636: Performed by: NURSE ANESTHETIST, CERTIFIED REGISTERED

## 2022-07-05 PROCEDURE — 370N000017 HC ANESTHESIA TECHNICAL FEE, PER MIN: Performed by: SURGERY

## 2022-07-05 PROCEDURE — 93005 ELECTROCARDIOGRAM TRACING: CPT

## 2022-07-05 PROCEDURE — 82565 ASSAY OF CREATININE: CPT | Performed by: SURGERY

## 2022-07-05 PROCEDURE — 258N000003 HC RX IP 258 OP 636: Performed by: PHYSICIAN ASSISTANT

## 2022-07-05 PROCEDURE — C1894 INTRO/SHEATH, NON-LASER: HCPCS | Performed by: SURGERY

## 2022-07-05 PROCEDURE — 34705 EVAC RPR A-BIILIAC NDGFT: CPT | Mod: 62 | Performed by: SURGERY

## 2022-07-05 PROCEDURE — 250N000011 HC RX IP 250 OP 636: Performed by: PHYSICIAN ASSISTANT

## 2022-07-05 PROCEDURE — 250N000025 HC SEVOFLURANE, PER MIN: Performed by: SURGERY

## 2022-07-05 PROCEDURE — 999N000141 HC STATISTIC PRE-PROCEDURE NURSING ASSESSMENT: Performed by: SURGERY

## 2022-07-05 PROCEDURE — 255N000002 HC RX 255 OP 636: Performed by: SURGERY

## 2022-07-05 PROCEDURE — 250N000013 HC RX MED GY IP 250 OP 250 PS 637: Performed by: PHYSICIAN ASSISTANT

## 2022-07-05 PROCEDURE — C1768 GRAFT, VASCULAR: HCPCS | Performed by: SURGERY

## 2022-07-05 PROCEDURE — 93010 ELECTROCARDIOGRAM REPORT: CPT | Performed by: INTERNAL MEDICINE

## 2022-07-05 PROCEDURE — 250N000011 HC RX IP 250 OP 636: Performed by: SURGERY

## 2022-07-05 PROCEDURE — 83036 HEMOGLOBIN GLYCOSYLATED A1C: CPT | Performed by: SURGERY

## 2022-07-05 PROCEDURE — C1769 GUIDE WIRE: HCPCS | Performed by: SURGERY

## 2022-07-05 PROCEDURE — 71045 X-RAY EXAM CHEST 1 VIEW: CPT

## 2022-07-05 PROCEDURE — 710N000010 HC RECOVERY PHASE 1, LEVEL 2, PER MIN: Performed by: SURGERY

## 2022-07-05 PROCEDURE — C1725 CATH, TRANSLUMIN NON-LASER: HCPCS | Performed by: SURGERY

## 2022-07-05 PROCEDURE — 120N000013 HC R&B IMCU

## 2022-07-05 PROCEDURE — 250N000009 HC RX 250: Performed by: NURSE ANESTHETIST, CERTIFIED REGISTERED

## 2022-07-05 PROCEDURE — 34713 PERQ ACCESS & CLSR FEM ART: CPT | Mod: 62 | Performed by: SURGERY

## 2022-07-05 PROCEDURE — 84132 ASSAY OF SERUM POTASSIUM: CPT | Performed by: ANESTHESIOLOGY

## 2022-07-05 PROCEDURE — 86923 COMPATIBILITY TEST ELECTRIC: CPT | Performed by: SURGERY

## 2022-07-05 PROCEDURE — 258N000003 HC RX IP 258 OP 636: Performed by: ANESTHESIOLOGY

## 2022-07-05 PROCEDURE — 250N000009 HC RX 250: Performed by: SURGERY

## 2022-07-05 PROCEDURE — 999N000083 IR ABDOMINAL ENDOVASCULAR STENT GRAFT

## 2022-07-05 PROCEDURE — 36415 COLL VENOUS BLD VENIPUNCTURE: CPT | Performed by: SURGERY

## 2022-07-05 DEVICE — STENT GRAFT ENDURANT II CONTRALATERAL LIMB 16X20X124MM: Type: IMPLANTABLE DEVICE | Site: ILIAC/FEMORALS | Status: FUNCTIONAL

## 2022-07-05 DEVICE — IMPLANTABLE DEVICE: Type: IMPLANTABLE DEVICE | Site: AORTA | Status: FUNCTIONAL

## 2022-07-05 RX ORDER — LIDOCAINE HYDROCHLORIDE 20 MG/ML
INJECTION, SOLUTION INFILTRATION; PERINEURAL PRN
Status: DISCONTINUED | OUTPATIENT
Start: 2022-07-05 | End: 2022-07-05

## 2022-07-05 RX ORDER — ASPIRIN 81 MG/1
81 TABLET ORAL DAILY
Status: DISCONTINUED | OUTPATIENT
Start: 2022-07-06 | End: 2022-07-05

## 2022-07-05 RX ORDER — NALOXONE HYDROCHLORIDE 0.4 MG/ML
0.4 INJECTION, SOLUTION INTRAMUSCULAR; INTRAVENOUS; SUBCUTANEOUS
Status: DISCONTINUED | OUTPATIENT
Start: 2022-07-05 | End: 2022-07-06 | Stop reason: HOSPADM

## 2022-07-05 RX ORDER — ONDANSETRON 2 MG/ML
INJECTION INTRAMUSCULAR; INTRAVENOUS PRN
Status: DISCONTINUED | OUTPATIENT
Start: 2022-07-05 | End: 2022-07-05

## 2022-07-05 RX ORDER — AMOXICILLIN 250 MG
1 CAPSULE ORAL 2 TIMES DAILY
Status: DISCONTINUED | OUTPATIENT
Start: 2022-07-05 | End: 2022-07-06 | Stop reason: HOSPADM

## 2022-07-05 RX ORDER — FAMOTIDINE 20 MG/1
20 TABLET, FILM COATED ORAL 2 TIMES DAILY
Status: DISCONTINUED | OUTPATIENT
Start: 2022-07-05 | End: 2022-07-06 | Stop reason: HOSPADM

## 2022-07-05 RX ORDER — SODIUM CHLORIDE, SODIUM LACTATE, POTASSIUM CHLORIDE, CALCIUM CHLORIDE 600; 310; 30; 20 MG/100ML; MG/100ML; MG/100ML; MG/100ML
INJECTION, SOLUTION INTRAVENOUS CONTINUOUS
Status: DISCONTINUED | OUTPATIENT
Start: 2022-07-05 | End: 2022-07-05 | Stop reason: HOSPADM

## 2022-07-05 RX ORDER — DEXAMETHASONE SODIUM PHOSPHATE 4 MG/ML
INJECTION, SOLUTION INTRA-ARTICULAR; INTRALESIONAL; INTRAMUSCULAR; INTRAVENOUS; SOFT TISSUE PRN
Status: DISCONTINUED | OUTPATIENT
Start: 2022-07-05 | End: 2022-07-05

## 2022-07-05 RX ORDER — MAGNESIUM HYDROXIDE/ALUMINUM HYDROXICE/SIMETHICONE 120; 1200; 1200 MG/30ML; MG/30ML; MG/30ML
30 SUSPENSION ORAL EVERY 4 HOURS PRN
Status: DISCONTINUED | OUTPATIENT
Start: 2022-07-05 | End: 2022-07-06 | Stop reason: HOSPADM

## 2022-07-05 RX ORDER — NALOXONE HYDROCHLORIDE 0.4 MG/ML
0.2 INJECTION, SOLUTION INTRAMUSCULAR; INTRAVENOUS; SUBCUTANEOUS
Status: DISCONTINUED | OUTPATIENT
Start: 2022-07-05 | End: 2022-07-06 | Stop reason: HOSPADM

## 2022-07-05 RX ORDER — BISACODYL 10 MG
10 SUPPOSITORY, RECTAL RECTAL DAILY PRN
Status: DISCONTINUED | OUTPATIENT
Start: 2022-07-05 | End: 2022-07-06 | Stop reason: HOSPADM

## 2022-07-05 RX ORDER — SODIUM CHLORIDE, SODIUM LACTATE, POTASSIUM CHLORIDE, CALCIUM CHLORIDE 600; 310; 30; 20 MG/100ML; MG/100ML; MG/100ML; MG/100ML
INJECTION, SOLUTION INTRAVENOUS CONTINUOUS PRN
Status: DISCONTINUED | OUTPATIENT
Start: 2022-07-05 | End: 2022-07-05

## 2022-07-05 RX ORDER — LIDOCAINE 40 MG/G
CREAM TOPICAL
Status: DISCONTINUED | OUTPATIENT
Start: 2022-07-05 | End: 2022-07-05

## 2022-07-05 RX ORDER — CEFAZOLIN SODIUM 1 G/3ML
1 INJECTION, POWDER, FOR SOLUTION INTRAMUSCULAR; INTRAVENOUS EVERY 8 HOURS
Status: COMPLETED | OUTPATIENT
Start: 2022-07-05 | End: 2022-07-06

## 2022-07-05 RX ORDER — OXYCODONE HYDROCHLORIDE 5 MG/1
5 TABLET ORAL EVERY 4 HOURS PRN
Status: DISCONTINUED | OUTPATIENT
Start: 2022-07-05 | End: 2022-07-06 | Stop reason: HOSPADM

## 2022-07-05 RX ORDER — SODIUM CHLORIDE, SODIUM LACTATE, POTASSIUM CHLORIDE, CALCIUM CHLORIDE 600; 310; 30; 20 MG/100ML; MG/100ML; MG/100ML; MG/100ML
INJECTION, SOLUTION INTRAVENOUS CONTINUOUS
Status: DISCONTINUED | OUTPATIENT
Start: 2022-07-05 | End: 2022-07-06

## 2022-07-05 RX ORDER — ASPIRIN 325 MG
325 TABLET ORAL DAILY
Status: DISCONTINUED | OUTPATIENT
Start: 2022-07-05 | End: 2022-07-06 | Stop reason: HOSPADM

## 2022-07-05 RX ORDER — METOPROLOL SUCCINATE 50 MG/1
50 TABLET, EXTENDED RELEASE ORAL DAILY
Status: DISCONTINUED | OUTPATIENT
Start: 2022-07-06 | End: 2022-07-06 | Stop reason: HOSPADM

## 2022-07-05 RX ORDER — LISINOPRIL 10 MG/1
10 TABLET ORAL DAILY
Status: DISCONTINUED | OUTPATIENT
Start: 2022-07-06 | End: 2022-07-06 | Stop reason: HOSPADM

## 2022-07-05 RX ORDER — BUPIVACAINE HYDROCHLORIDE AND EPINEPHRINE 5; 5 MG/ML; UG/ML
INJECTION, SOLUTION PERINEURAL PRN
Status: DISCONTINUED | OUTPATIENT
Start: 2022-07-05 | End: 2022-07-05 | Stop reason: HOSPADM

## 2022-07-05 RX ORDER — EPHEDRINE SULFATE 50 MG/ML
INJECTION, SOLUTION INTRAMUSCULAR; INTRAVENOUS; SUBCUTANEOUS PRN
Status: DISCONTINUED | OUTPATIENT
Start: 2022-07-05 | End: 2022-07-05

## 2022-07-05 RX ORDER — HYDROMORPHONE HCL IN WATER/PF 6 MG/30 ML
0.2 PATIENT CONTROLLED ANALGESIA SYRINGE INTRAVENOUS
Status: DISCONTINUED | OUTPATIENT
Start: 2022-07-05 | End: 2022-07-06 | Stop reason: HOSPADM

## 2022-07-05 RX ORDER — IOPAMIDOL 612 MG/ML
INJECTION, SOLUTION INTRAVASCULAR PRN
Status: DISCONTINUED | OUTPATIENT
Start: 2022-07-05 | End: 2022-07-05 | Stop reason: HOSPADM

## 2022-07-05 RX ORDER — LEVOTHYROXINE SODIUM 88 UG/1
88 TABLET ORAL DAILY
Status: DISCONTINUED | OUTPATIENT
Start: 2022-07-06 | End: 2022-07-06 | Stop reason: HOSPADM

## 2022-07-05 RX ORDER — PROCHLORPERAZINE MALEATE 5 MG
5 TABLET ORAL EVERY 6 HOURS PRN
Status: DISCONTINUED | OUTPATIENT
Start: 2022-07-05 | End: 2022-07-06 | Stop reason: HOSPADM

## 2022-07-05 RX ORDER — PROPOFOL 10 MG/ML
INJECTION, EMULSION INTRAVENOUS PRN
Status: DISCONTINUED | OUTPATIENT
Start: 2022-07-05 | End: 2022-07-05

## 2022-07-05 RX ORDER — HEPARIN SODIUM 1000 [USP'U]/ML
INJECTION, SOLUTION INTRAVENOUS; SUBCUTANEOUS PRN
Status: DISCONTINUED | OUTPATIENT
Start: 2022-07-05 | End: 2022-07-05

## 2022-07-05 RX ORDER — GLYCOPYRROLATE 0.2 MG/ML
INJECTION, SOLUTION INTRAMUSCULAR; INTRAVENOUS PRN
Status: DISCONTINUED | OUTPATIENT
Start: 2022-07-05 | End: 2022-07-05

## 2022-07-05 RX ORDER — POLYETHYLENE GLYCOL 3350 17 G/17G
17 POWDER, FOR SOLUTION ORAL DAILY
Status: DISCONTINUED | OUTPATIENT
Start: 2022-07-06 | End: 2022-07-06 | Stop reason: HOSPADM

## 2022-07-05 RX ORDER — SODIUM CHLORIDE 9 MG/ML
INJECTION, SOLUTION INTRAVENOUS CONTINUOUS PRN
Status: DISCONTINUED | OUTPATIENT
Start: 2022-07-05 | End: 2022-07-05

## 2022-07-05 RX ORDER — ACETAMINOPHEN 325 MG/1
975 TABLET ORAL EVERY 8 HOURS
Status: DISCONTINUED | OUTPATIENT
Start: 2022-07-05 | End: 2022-07-06 | Stop reason: HOSPADM

## 2022-07-05 RX ORDER — LIDOCAINE 40 MG/G
CREAM TOPICAL
Status: DISCONTINUED | OUTPATIENT
Start: 2022-07-05 | End: 2022-07-06 | Stop reason: HOSPADM

## 2022-07-05 RX ORDER — CEFAZOLIN SODIUM/WATER 2 G/20 ML
2 SYRINGE (ML) INTRAVENOUS
Status: DISCONTINUED | OUTPATIENT
Start: 2022-07-05 | End: 2022-07-05 | Stop reason: HOSPADM

## 2022-07-05 RX ORDER — HYDROMORPHONE HCL IN WATER/PF 6 MG/30 ML
0.4 PATIENT CONTROLLED ANALGESIA SYRINGE INTRAVENOUS
Status: DISCONTINUED | OUTPATIENT
Start: 2022-07-05 | End: 2022-07-06 | Stop reason: HOSPADM

## 2022-07-05 RX ORDER — ONDANSETRON 2 MG/ML
4 INJECTION INTRAMUSCULAR; INTRAVENOUS EVERY 6 HOURS PRN
Status: DISCONTINUED | OUTPATIENT
Start: 2022-07-05 | End: 2022-07-06 | Stop reason: HOSPADM

## 2022-07-05 RX ORDER — ONDANSETRON 4 MG/1
4 TABLET, ORALLY DISINTEGRATING ORAL EVERY 6 HOURS PRN
Status: DISCONTINUED | OUTPATIENT
Start: 2022-07-05 | End: 2022-07-06 | Stop reason: HOSPADM

## 2022-07-05 RX ORDER — FENTANYL CITRATE 50 UG/ML
INJECTION, SOLUTION INTRAMUSCULAR; INTRAVENOUS PRN
Status: DISCONTINUED | OUTPATIENT
Start: 2022-07-05 | End: 2022-07-05

## 2022-07-05 RX ORDER — HYDROMORPHONE HYDROCHLORIDE 1 MG/ML
INJECTION, SOLUTION INTRAMUSCULAR; INTRAVENOUS; SUBCUTANEOUS PRN
Status: DISCONTINUED | OUTPATIENT
Start: 2022-07-05 | End: 2022-07-05

## 2022-07-05 RX ORDER — ACETAMINOPHEN 325 MG/1
650 TABLET ORAL EVERY 4 HOURS PRN
Status: DISCONTINUED | OUTPATIENT
Start: 2022-07-08 | End: 2022-07-06 | Stop reason: HOSPADM

## 2022-07-05 RX ORDER — OXYCODONE HYDROCHLORIDE 5 MG/1
10 TABLET ORAL EVERY 4 HOURS PRN
Status: DISCONTINUED | OUTPATIENT
Start: 2022-07-05 | End: 2022-07-06 | Stop reason: HOSPADM

## 2022-07-05 RX ORDER — CEFAZOLIN SODIUM/WATER 2 G/20 ML
SYRINGE (ML) INTRAVENOUS PRN
Status: DISCONTINUED | OUTPATIENT
Start: 2022-07-05 | End: 2022-07-05

## 2022-07-05 RX ADMIN — ROCURONIUM BROMIDE 50 MG: 50 INJECTION, SOLUTION INTRAVENOUS at 11:19

## 2022-07-05 RX ADMIN — GLYCOPYRROLATE 0.1 MG: 0.2 INJECTION, SOLUTION INTRAMUSCULAR; INTRAVENOUS at 12:07

## 2022-07-05 RX ADMIN — PROPOFOL 120 MG: 10 INJECTION, EMULSION INTRAVENOUS at 11:19

## 2022-07-05 RX ADMIN — HYDROMORPHONE HYDROCHLORIDE 0.5 MG: 1 INJECTION, SOLUTION INTRAMUSCULAR; INTRAVENOUS; SUBCUTANEOUS at 12:47

## 2022-07-05 RX ADMIN — PHENYLEPHRINE HYDROCHLORIDE 100 MCG: 10 INJECTION INTRAVENOUS at 14:17

## 2022-07-05 RX ADMIN — Medication 2 G: at 11:32

## 2022-07-05 RX ADMIN — ONDANSETRON 4 MG: 2 INJECTION INTRAMUSCULAR; INTRAVENOUS at 14:04

## 2022-07-05 RX ADMIN — ROCURONIUM BROMIDE 10 MG: 50 INJECTION, SOLUTION INTRAVENOUS at 13:02

## 2022-07-05 RX ADMIN — PROPOFOL 30 MG: 10 INJECTION, EMULSION INTRAVENOUS at 14:22

## 2022-07-05 RX ADMIN — FENTANYL CITRATE 50 MCG: 50 INJECTION, SOLUTION INTRAMUSCULAR; INTRAVENOUS at 11:19

## 2022-07-05 RX ADMIN — DEXMEDETOMIDINE HYDROCHLORIDE 12 MCG: 100 INJECTION, SOLUTION INTRAVENOUS at 14:12

## 2022-07-05 RX ADMIN — PHENYLEPHRINE HYDROCHLORIDE 100 MCG: 10 INJECTION INTRAVENOUS at 11:29

## 2022-07-05 RX ADMIN — SENNOSIDES AND DOCUSATE SODIUM 1 TABLET: 50; 8.6 TABLET ORAL at 21:44

## 2022-07-05 RX ADMIN — SUGAMMADEX 200 MG: 100 INJECTION, SOLUTION INTRAVENOUS at 15:28

## 2022-07-05 RX ADMIN — LIDOCAINE HYDROCHLORIDE 100 MG: 20 INJECTION, SOLUTION INFILTRATION; PERINEURAL at 11:19

## 2022-07-05 RX ADMIN — SODIUM CHLORIDE, POTASSIUM CHLORIDE, SODIUM LACTATE AND CALCIUM CHLORIDE: 600; 310; 30; 20 INJECTION, SOLUTION INTRAVENOUS at 09:52

## 2022-07-05 RX ADMIN — HEPARIN SODIUM 8000 UNITS: 1000 INJECTION INTRAVENOUS; SUBCUTANEOUS at 13:07

## 2022-07-05 RX ADMIN — SODIUM CHLORIDE: 9 INJECTION, SOLUTION INTRAVENOUS at 11:21

## 2022-07-05 RX ADMIN — PHENYLEPHRINE HYDROCHLORIDE 0.5 MCG/KG/MIN: 10 INJECTION INTRAVENOUS at 11:22

## 2022-07-05 RX ADMIN — SODIUM CHLORIDE, POTASSIUM CHLORIDE, SODIUM LACTATE AND CALCIUM CHLORIDE: 600; 310; 30; 20 INJECTION, SOLUTION INTRAVENOUS at 13:42

## 2022-07-05 RX ADMIN — PHENYLEPHRINE HYDROCHLORIDE 100 MCG: 10 INJECTION INTRAVENOUS at 11:19

## 2022-07-05 RX ADMIN — FENTANYL CITRATE 50 MCG: 50 INJECTION, SOLUTION INTRAMUSCULAR; INTRAVENOUS at 11:55

## 2022-07-05 RX ADMIN — ASPIRIN 325 MG ORAL TABLET 325 MG: 325 PILL ORAL at 19:10

## 2022-07-05 RX ADMIN — PROPOFOL 30 MG: 10 INJECTION, EMULSION INTRAVENOUS at 12:46

## 2022-07-05 RX ADMIN — DEXAMETHASONE SODIUM PHOSPHATE 4 MG: 4 INJECTION, SOLUTION INTRA-ARTICULAR; INTRALESIONAL; INTRAMUSCULAR; INTRAVENOUS; SOFT TISSUE at 11:25

## 2022-07-05 RX ADMIN — SODIUM CHLORIDE, POTASSIUM CHLORIDE, SODIUM LACTATE AND CALCIUM CHLORIDE: 600; 310; 30; 20 INJECTION, SOLUTION INTRAVENOUS at 11:15

## 2022-07-05 RX ADMIN — SODIUM CHLORIDE, POTASSIUM CHLORIDE, SODIUM LACTATE AND CALCIUM CHLORIDE: 600; 310; 30; 20 INJECTION, SOLUTION INTRAVENOUS at 20:39

## 2022-07-05 RX ADMIN — ROCURONIUM BROMIDE 20 MG: 50 INJECTION, SOLUTION INTRAVENOUS at 12:16

## 2022-07-05 RX ADMIN — PHENYLEPHRINE HYDROCHLORIDE 50 MCG: 10 INJECTION INTRAVENOUS at 12:25

## 2022-07-05 RX ADMIN — CEFAZOLIN 1 G: 1 INJECTION, POWDER, FOR SOLUTION INTRAMUSCULAR; INTRAVENOUS at 19:10

## 2022-07-05 RX ADMIN — PHENYLEPHRINE HYDROCHLORIDE 100 MCG: 10 INJECTION INTRAVENOUS at 12:56

## 2022-07-05 RX ADMIN — PHENYLEPHRINE HYDROCHLORIDE 100 MCG: 10 INJECTION INTRAVENOUS at 12:12

## 2022-07-05 RX ADMIN — Medication 10 MG: at 14:32

## 2022-07-05 RX ADMIN — FAMOTIDINE 20 MG: 20 TABLET ORAL at 21:44

## 2022-07-05 RX ADMIN — SODIUM CHLORIDE, POTASSIUM CHLORIDE, SODIUM LACTATE AND CALCIUM CHLORIDE: 600; 310; 30; 20 INJECTION, SOLUTION INTRAVENOUS at 18:18

## 2022-07-05 RX ADMIN — FLUTICASONE FUROATE AND VILANTEROL TRIFENATATE 1 PUFF: 100; 25 POWDER RESPIRATORY (INHALATION) at 21:48

## 2022-07-05 RX ADMIN — ACETAMINOPHEN 975 MG: 325 TABLET ORAL at 19:10

## 2022-07-05 ASSESSMENT — ACTIVITIES OF DAILY LIVING (ADL)
ADLS_ACUITY_SCORE: 35
WALKING_OR_CLIMBING_STAIRS_DIFFICULTY: NO
FALL_HISTORY_WITHIN_LAST_SIX_MONTHS: NO
TOILETING_ISSUES: NO
DOING_ERRANDS_INDEPENDENTLY_DIFFICULTY: NO
ADLS_ACUITY_SCORE: 35
ADLS_ACUITY_SCORE: 35
DIFFICULTY_EATING/SWALLOWING: NO
ADLS_ACUITY_SCORE: 35
ADLS_ACUITY_SCORE: 20
CHANGE_IN_FUNCTIONAL_STATUS_SINCE_ONSET_OF_CURRENT_ILLNESS/INJURY: NO
DRESSING/BATHING_DIFFICULTY: NO
WEAR_GLASSES_OR_BLIND: NO
CONCENTRATING,_REMEMBERING_OR_MAKING_DECISIONS_DIFFICULTY: NO
ADLS_ACUITY_SCORE: 35

## 2022-07-05 ASSESSMENT — ENCOUNTER SYMPTOMS
SEIZURES: 0
DYSRHYTHMIAS: 0

## 2022-07-05 NOTE — OP NOTE
Procedure Date: 07/05/2022    PREOPERATIVE DIAGNOSIS:   5.5 cm abdominal aortic aneurysm.    POSTOPERATIVE DIAGNOSIS:   5.5 cm abdominal aortic aneurysm.    PROCEDURE PERFORMED:     1.  Exposure of bilateral common femoral arteries.  2.  Endovascular repair of abdominal aortic aneurysm using a Medtronic bifurcated graft.  3.  Repair of bilateral common femoral arteries.    CO-SURGEONS:  Ayush Mays MD, Vascular Surgery; Rigo Lundberg, of Interventional Radiology.  The complex nature of this procedure mandated a team approach.  Two operators worked simultaneously as co-surgeons to perform this procedure.    ASSISTANT:  Lyndsay Huang MD ([facility] Resident)    ANESTHESIA:  General endotracheal anesthesia.    ESTIMATED BLOOD LOSS:  100 mL    INDICATIONS FOR PROCEDURE:  This patient is a 77-year-old gentleman now with a 5.5 cm infrarenal abdominal aortic aneurysm.  There are some anatomic challenges, but he is still a candidate for endovascular repair.  After a detailed discussion as to the advantages and/or disadvantages of open versus endovascular repair, he has opted to proceed with endovascular repair.    OPERATIVE FINDINGS:  The bilateral common femoral arteries had posterior plaque.  There were soft spots anteriorly that allowed for access.  We performed an uneventful endovascular repair of the AAA.  There was no demonstrable endoleak and a well-positioned graft at the end of the procedure. There were palpable pulses in the common femoral arteries distal to our repairs.  There were good multiphasic Doppler signals in both feet at completion as was his preoperative baseline.    DESCRIPTION OF PROCEDURE:  After informed consent was obtained, the patient was brought to the operating room and placed on the table in a supine position.  General endotracheal anesthesia was achieved without incident.  A An catheter was placed.  His abdomen and bilateral groins were prepped and draped in the usual sterile fashion.   Timeout was called and I verified the patient's identity, the operative site, and the proposed procedure.  I began with an oblique incision centered over the left common femoral artery just above the inguinal crease.  Dissection proceeded sharply downward to expose the left common femoral artery at the level of the inguinal ligament.  The inguinal ligament was mobilized and I obtained proximal control of the common femoral artery with a vessel loop.  The lateral and medial circumflex vessels were dissected free and encircled with vessel loops.  I dissected the distal left common femoral artery and encircled it with a vessel loop.  There was a soft spot anteriorly that would allow for access.  Next, I made an oblique incision centered over the right common femoral artery.  My dissection proceeded sharply downward to expose that vessel at the level of the inguinal ligament.  Dissection continued distally and I exposed the profunda and the superficial femoral arteries.  Those vessels were encircled with vessel loops.      At this point, I was joined by my cosurgeon, Dr. Rigo Lundberg.  He will dictate the specifics of the endovascular portion separately on the interventional radiologic dictation line.      Access was gained to both common femoral arteries using 6 Telugu sheaths.  An angled Glidewire/glide catheter combination was directed up the right side to the level of the aortic arch.  Wire exchange was made for a Lunderquist wire.  On the left side, a pigtail catheter was delivered to the level of the renal arteries.  A Medtronic 25 x 14 x 103 main body device was delivered bareback up the right side to the level of the renal arteries.  An arteriogram was performed and we confirmed the location of the lower lying left renal artery.  The graft was deployed down to the level of the contralateral gate with excellent positioning relative to the renal arteries.  The contralateral gate was then cannulated from the left  side using an angled Glidewire/pigtail catheter combination.  We confirmed positioning of our catheter within the lumen of the graft.  A Lunderquist wire was advanced up the left side to the level of the aortic arch.  Additional pelvic angiograms were performed to isolate the location of the occluded left hypogastric artery.  The contralateral left common iliac 16 mm x 20 x 124 mm limb was then delivered up the left side.  It was appropriately positioned and deployed.  I deployed the remainder of the ipsilateral limb into the right common iliac artery.  We performed additional angiography to confirm location of the occluded right internal iliac artery.  An ipsilateral 16 x 20 x 124 mm limb was then deployed on the right side.  Following deployment of these limbs, a 14 Ukrainian DrySeal sheath was placed on the left side and a 16 Ukrainian DrySeal sheath was placed on the right side.  We then utilized a Reliant balloon coming from both sides to gently balloon the proximal aortic neck and all overlap zones as well as the bilateral iliac arteries.  Completion angiography demonstrated an excellent position of the graft with no evidence of a proximal type 1A endoleak.  There was concern for mild narrowing in the mid left common iliac artery and this area was gently ballooned with a 12 mm balloon with good result.  Additional angiography revealed a well-positioned endograft with no evidence of leak.  We chose to accept this.      On the right side, proximal and distal control of the common femoral artery was achieved as we removed the 16 Ukrainian sheath.  The arteriotomy was closed transversely with running 5-0 Prolene suture.  Prior to completing that suture line, all clamps and vessel loops were briefly released to flush any debris out of the right common femoral lumen.  This anastomosis was subsequently secured and observed to be hemostatic as flow was restored down the right leg.  On the left side, proximal and distal control  was achieved as the 14 Faroese sheath was removed.  That arteriotomy was closed with interrupted 5-0 Prolene figure-of-eight sutures.  Prior to completing the final stitches in the suture line, all clamps were briefly released to flush any debris out of the arterial lumen.  The final stitches were secured and that anastomosis was observed to be hemostatic.  Surgicel gauze and gentle compression were held over each of these vessels for about 5 minutes.  Following this, we had excellent hemostasis.  Both wounds were locally infiltrated with a total of 30 mL of 0.5% Marcaine with epinephrine.  Both wounds were then meticulously closed in layers using interrupted absorbable suture.  Skin for both incisions was closed with 4-0 Monocryl running subcuticular sutures.  Steri-Strips and sterile dressings were applied.  Final sponge and needle count were reported as correct.  The patient tolerated the procedure without incident.  He had excellent triphasic pulses in his bilateral posterior tibial arteries and biphasic pulses in his bilateral dorsalis pedis arteries as was his preoperative baseline.  He was returned extubated and hemodynamically stable to the recovery room.    Ayush Mays MD        D: 2022   T: 2022   MT: FRANCISCO    Name:     ADIEL TYSON  MRN:      [id number]        Account:        [id number]   :      1945           Procedure Date: 2022     Document: [id number]

## 2022-07-05 NOTE — ANESTHESIA PROCEDURE NOTES
Airway       Patient location during procedure: OR       Procedure Start/Stop Times: 7/5/2022 11:21 AM  Staff -        Anesthesiologist:  Karen Friedman MD       CRNA: María Granados APRN CRNA       Performed By: CRNA and anesthesiologist  Consent for Airway        Urgency: elective  Indications and Patient Condition       Indications for airway management: antonieta-procedural       Induction type:intravenous       Mask difficulty assessment: 1 - vent by mask    Final Airway Details       Final airway type: endotracheal airway       Successful airway: ETT - single  Endotracheal Airway Details        ETT size (mm): 8.0       Cuffed: yes       Successful intubation technique: direct laryngoscopy       DL Blade Type: Bullard 2       Grade View of Cords: 1       Adjucts: stylet       Position: Right       Measured from: lips       Secured at (cm): 22       Bite block used: None    Post intubation assessment        Placement verified by: capnometry, equal breath sounds and chest rise        Number of attempts at approach: 1       Secured with: pink tape       Ease of procedure: easy       Dentition: Intact and Unchanged    Medication(s) Administered   Medication Administration Time: 7/5/2022 11:21 AM

## 2022-07-05 NOTE — BRIEF OP NOTE
[facility]    Brief Operative Note    Pre-operative diagnosis: Abdominal aortic aneurysm (AAA) >39 mm diameter (H) [I71.4]  Post-operative diagnosis Same as pre-operative diagnosis    Procedure: Procedure(s):  BILATERAL FEMORAL ARTERY EXPOSURE, BILATERAL FEMORAL ARTERY REPAIR, CUTDOWN APPROACH ENDOVASCULAR ABDOMINAL AORTA ANEURYSM REPAIR WITH MEDTRONIC STENT GRAFTS  Surgeon: Surgeon(s) and Role:     * Ayush Mays MD - Primary     * Rigo Lundberg MD - Assisting  Anesthesia: General   Estimated Blood Loss: 100 ml    Drains: None  Specimens: * No specimens in log *  Findings:   Bilateral common femoral artery open exposure for abdominal aneurysm endvascular repair. No endoleaks seen. Distal pulses intact after procedure..  Complications: None.  Implants:   Implant Name Type Inv. Item Serial No.  Lot No. LRB No. Used Action   GRAFT STENT ENDURANT II AAA 25MM X 14MM - [id number] Graft GRAFT STENT ENDURANT II AAA 25MM X 14MM [id number] [employer]  Right 1 Implanted   STENT GRAFT ENDURANT II CONTRALATERAL LIMB [id number] - [id number] Graft STENT GRAFT ENDURANT II CONTRALATERAL LIMB [id number] [employer]  Left 1 Implanted   ENDURANT  IIs STENT GRAFT SYSTEM LIMB 16MM X 20MM X 124MM X 16FR Stent Graft  [id number] [employer]  Right 1 Implanted

## 2022-07-05 NOTE — ANESTHESIA POSTPROCEDURE EVALUATION
Patient: Rubio Gibbons    Procedure: Procedure(s):  BILATERAL FEMORAL ARTERY EXPOSURE, BILATERAL FEMORAL ARTERY REPAIR, CUTDOWN APPROACH ENDOVASCULAR ABDOMINAL AORTA ANEURYSM REPAIR WITH MEDTRONIC STENT GRAFTS       Anesthesia Type:  General    Note:  Disposition: Admission   Postop Pain Control: Uneventful            Sign Out: Well controlled pain   PONV: No   Neuro/Psych: Uneventful            Sign Out: Acceptable/Baseline neuro status   Airway/Respiratory: Uneventful            Sign Out: Acceptable/Baseline resp. status   CV/Hemodynamics: Uneventful            Sign Out: Acceptable CV status; No obvious hypovolemia; No obvious fluid overload   Other NRE: NONE   DID A NON-ROUTINE EVENT OCCUR? No           Last vitals:  Vitals Value Taken Time   /75 07/05/22 1557   Temp 36.3  C (97.3  F) 07/05/22 1534   Pulse 60 07/05/22 1617   Resp 14 07/05/22 1617   SpO2 96 % 07/05/22 1617   Vitals shown include unvalidated device data.    Electronically Signed By: Karen Friedman MD  July 5, 2022  4:18 PM

## 2022-07-05 NOTE — ANESTHESIA PROCEDURE NOTES
Arterial Line Procedure Note    Pre-Procedure   Staff -        Anesthesiologist:  Karen Friedman MD       Performed By: anesthesiologist       Location: pre-op       Pre-Anesthestic Checklist: patient identified, IV checked, site marked, risks and benefits discussed, informed consent, monitors and equipment checked, pre-op evaluation and at physician/surgeon's request  Timeout:       Correct Patient: Yes        Correct Procedure: Yes        Correct Site: Yes        Correct Position: Yes   Line Placement:   This line was placed Pre Induction starting at 7/5/2022 10:43 AM and ending at 7/5/2022 10:48 AM  Procedure   Procedure: arterial line       Laterality: left       Insertion Site: radial.  Sterile Prep        All elements of maximal sterile barrier technique followed       Patient Prep/Sterile Barriers: hand hygiene, sterile gloves, hat, mask       Skin prep: Chloraprep  Insertion/Injection        Technique: Seldinger Technique and ultrasound guided (no image saved)        1. Ultrasound was used to evaluate the access site.       2. Artery evaluated via ultrasound for patency/adequacy.       3. Using real-time ultrasound the needle/catheter was observed entering the artery/vein.       5. The visualized structures were anatomically normal.       6. There were no apparent abnormal pathologic findings.       Catheter Type/Size: 20 gauge, 1.75 in/4.5 cm quick cath (integral wire)  Narrative        Tegaderm dressing used.       Arterial waveform: Yes        IBP within 10% of NIBP: Yes

## 2022-07-05 NOTE — ANESTHESIA CARE TRANSFER NOTE
Patient: Rubio Gibbons    Procedure: Procedure(s):  BILATERAL FEMORAL ARTERY EXPOSURE, BILATERAL FEMORAL ARTERY REPAIR, CUTDOWN APPROACH ENDOVASCULAR ABDOMINAL AORTA ANEURYSM REPAIR WITH MEDTRONIC STENT GRAFTS       Diagnosis: Abdominal aortic aneurysm (AAA) >39 mm diameter (H) [I71.4]  Diagnosis Additional Information: No value filed.    Anesthesia Type:   General     Note:    Oropharynx: oropharynx clear of all foreign objects  Level of Consciousness: awake  Oxygen Supplementation: face mask  Level of Supplemental Oxygen (L/min / FiO2): 6  Independent Airway: airway patency satisfactory and stable  Dentition: dentition unchanged  Vital Signs Stable: post-procedure vital signs reviewed and stable  Report to RN Given: handoff report given  Patient transferred to: PACU    Handoff Report: Identifed the Patient, Identified the Reponsible Provider, Reviewed the pertinent medical history, Discussed the surgical course, Reviewed Intra-OP anesthesia mangement and issues during anesthesia, Set expectations for post-procedure period and Allowed opportunity for questions and acknowledgement of understanding      Vitals:  Vitals Value Taken Time   BP     Temp     Pulse     Resp     SpO2         Electronically Signed By: WILLIAN Black CRNA  July 5, 2022  3:38 PM

## 2022-07-05 NOTE — ANESTHESIA PREPROCEDURE EVALUATION
Anesthesia Pre-Procedure Evaluation    Patient: Rubio Gibbons   MRN: [id number] : 1945        Procedure : Procedure(s):  ENDOVASCULAR REPAIR ABDOMINAL AORTIC ANEURYSM WITH MEDTRONIC GRAFT          Past Medical History:   Diagnosis Date     Abdominal aortic aneurysm (AAA) without rupture (H) 10/13/2016     CAD (coronary artery disease)      Coronary artery disease involving native coronary artery of native heart without angina pectoris 10/14/2019    Per Note. Dr. López OV 13 - to establish care. He has a history of myocardial infarction back in  @ [facility] . Does not remember any details- and no stent.   Nuclear 3-12-13 LVEF 34% to establish care. He has a history of myocardial infarction back in , small to moderately sized partially reversible distal anterior anteroseptal and apical defect, small to moderately sized inferio     COVID-19 virus vaccination declined 2021     Diabetes mellitus type 2, uncomplicated (H) 2016    Diet      WIN (dyspnea on exertion) 2014     Heart attack (H)      History of myocardial infarction in adulthood 2016     History of tobacco abuse 9/10/2019     HTN, goal below 140/90 2014     Hyperlipidemia LDL goal <100 10/31/2010     Hypothyroidism 2011     Hypothyroidism due to acquired atrophy of thyroid 2016     Ischemic cardiomyopathy      LBP (low back pain) 2014     Lumbar radiculopathy 9/10/2019     Microalbuminuria 11/7/2016    X1     Midline low back pain with right-sided sciatica 2021     Morbid obesity (H) 2021     Muscle spasm 2017     Other abnormal glucose 2014     Panlobular emphysema (H) 2016     Presbycusis syndrome, bilateral 2016     Right-sided low back pain without sciatica, unspecified chronicity 3/16/2017     S/P lumbar spinal arthrodesis 9/10/2019     Tobacco use disorder 2006     Tubular adenoma      Type 2 diabetes mellitus with circulatory disorder, without  long-term current use of insulin (H) 9/22/2016    Diet       Past Surgical History:   Procedure Laterality Date     ANGIOPLASTY  1998     COLONOSCOPY N/A 11/17/2016    Procedure: COLONOSCOPY;  Surgeon: Adam Lantigua MD;  Location:  GI     OPTICAL TRACKING SYSTEM FUSION SPINE POSTERIOR LUMBAR TWO LEVELS N/A 10/28/2019    Procedure: L5 Herrera-Talavera osteotomy and L4-5 bilateral decompressive laminectomy with L5-S1 transforaminal lumbar interbody fusion and  L4-S1 posterior instrumented fusion;  Surgeon: Phillip Beavers MD;  Location:  OR      Allergies   Allergen Reactions     Chantix [Varenicline] GI Disturbance      Social History     Tobacco Use     Smoking status: Former Smoker     Packs/day: 0.25     Years: 40.00     Pack years: 10.00     Types: Cigarettes     Smokeless tobacco: Never Used     Tobacco comment: last one was 10/13 (trying to quit)   Substance Use Topics     Alcohol use: No      Wt Readings from Last 1 Encounters:   06/30/22 78.8 kg (173 lb 11.2 oz)        Allergies   Allergen Reactions     Chantix [Varenicline] GI Disturbance     Prior to Admission medications    Medication Sig Start Date End Date Taking? Authorizing Provider   Ascorbic Acid (VITAMIN C GUMMIES) 125 MG CHEW Take 250 mg by mouth daily (125MG X 2 = 250MG)   Yes Reported, Patient   aspirin (ASPIRIN LOW DOSE) 81 MG EC tablet Take 1 tablet (81 mg) by mouth daily 11/1/19  Yes Bibi Mason APRN CNP   atorvastatin (LIPITOR) 40 MG tablet Take 1 tablet (40 mg) by mouth daily 3/16/22  Yes Rigo Goddard MD   fluticasone-salmeterol (ADVAIR) 250-50 MCG/DOSE inhaler Inhale 1 puff into the lungs every 12 hours 3/17/22  Yes Rigo Goddard MD   levothyroxine (SYNTHROID/LEVOTHROID) 88 MCG tablet Take 1 tablet (88 mcg) by mouth daily 3/16/22  Yes Rigo Goddard MD   lisinopril (ZESTRIL) 10 MG tablet Take 1 tablet (10 mg) by mouth daily 3/16/22  Yes Rigo Goddard MD   metoprolol succinate ER (TOPROL-XL) 50 MG 24 hr  tablet Take 1 tablet (50 mg) by mouth daily 3/16/22  Yes Rigo Goddard MD     RECENT LABS:   ECG 6/30/22: Sinus  Bradycardia   WITHIN NORMAL LIMITS    ECHO 10/16/22: 1.  Myocardial perfusion imaging using single isotope technique  demonstrated a fixed apical/distal anteroseptal defect consistent with  a small nontransmural infarct. No ischemia appreciated in present  study.   2. Gated images demonstrated no regional wall motion abnormalities.   The left ventricular systolic function is normal with LVEF of 54% with  stress.  3. No prior study for comparison.    Abdominal CT 4/6/22:                                                             IMPRESSION:  1. Aneurysmal dilatation of the infrarenal abdominal aorta measuring  5.5 x 5.2 cm. There is also ectasia of the aorta at the level of the  renal arteries measuring 2.8 x 2.8 cm.  2. Ectasia of the right common iliac artery measuring 1.8 cm and  ectasia of the left common iliac artery measuring 1.9 cm.  3. Extensive atherosclerotic disease is noted in the common femoral  arteries bilaterally.      Anesthesia Evaluation   Pt has had prior anesthetic. Type: General.    No history of anesthetic complications       ROS/MED HX  ENT/Pulmonary:     (+) tobacco use, Past use, COPD,  (-) asthma and sleep apnea   Neurologic:    (-) no seizures, no CVA and migraines   Cardiovascular:     (+) Dyslipidemia hypertension-Peripheral Vascular Disease-CAD --- (-) IWN, arrhythmias and valvular problems/murmurs   METS/Exercise Tolerance: >4 METS Comment: Abdominal Aortic Aneurysm   Hematologic:    (-) history of blood clots and anemia   Musculoskeletal:    (-) arthritis   GI/Hepatic:    (-) GERD and liver disease   Renal/Genitourinary:    (-) renal disease and nephrolithiasis   Endo:     (+) type II DM, thyroid problem, hypothyroidism,  (-) Type I DM and obesity   Psychiatric/Substance Use:    (-) psychiatric history   Infectious Disease:    (-) Recent Fever   Malignancy:       Other:             Physical Exam    Airway  airway exam normal      Mallampati: II   TM distance: > 3 FB   Neck ROM: full   Mouth opening: > 3 cm    Respiratory Devices and Support         Dental       (+) missing      Cardiovascular   cardiovascular exam normal       Rhythm and rate: normal     Pulmonary   pulmonary exam normal        breath sounds clear to auscultation           OUTSIDE LABS:  CBC:   Lab Results   Component Value Date    WBC 9.3 06/30/2022    WBC 7.5 10/10/2019    HGB 13.6 06/30/2022    HGB 14.7 10/10/2019    HCT 41.7 06/30/2022    HCT 44.8 10/10/2019     06/30/2022     10/10/2019     BMP:   Lab Results   Component Value Date     06/30/2022     03/16/2022    POTASSIUM 5.2 06/30/2022    POTASSIUM 4.9 03/16/2022    CHLORIDE 106 06/30/2022    CHLORIDE 105 03/16/2022    CO2 26 06/30/2022    CO2 27 03/16/2022    BUN 21 06/30/2022    BUN 21 03/16/2022    CR 1.14 06/30/2022    CR 1.16 03/16/2022     (H) 06/30/2022     (H) 03/16/2022     COAGS:   Lab Results   Component Value Date    PTT 34 10/10/2019    INR 1.13 10/10/2019     POC:   Lab Results   Component Value Date     (H) 10/30/2019     HEPATIC:   Lab Results   Component Value Date    ALBUMIN 3.7 03/16/2022    PROTTOTAL 7.3 03/16/2022    ALT 23 03/16/2022    AST 19 03/16/2022    ALKPHOS 72 03/16/2022    BILITOTAL 0.5 03/16/2022     OTHER:   Lab Results   Component Value Date    A1C 7.1 (H) 06/30/2022    LAKSHMI 9.1 06/30/2022    LIPASE 108 03/16/2017    TSH 4.37 (H) 03/16/2022    T4 1.04 03/16/2022    CRP 1.4 01/30/2013       Anesthesia Plan    ASA Status:  3   NPO Status:  NPO Appropriate    Anesthesia Type: General.     - Airway: ETT   Induction: Propofol.   Maintenance: Balanced.   Techniques and Equipment:     - Lines/Monitors: 2nd IV     - Blood: T&C     Consents    Anesthesia Plan(s) and associated risks, benefits, and realistic alternatives discussed. Questions answered and patient/representative(s) expressed  understanding.    - Discussed:     - Discussed with:  Patient         Postoperative Care    Pain management: Multi-modal analgesia.   PONV prophylaxis: Ondansetron (or other 5HT-3), Dexamethasone or Solumedrol, Background Propofol Infusion     Comments:                Karen Friedman MD

## 2022-07-06 VITALS
RESPIRATION RATE: 22 BRPM | DIASTOLIC BLOOD PRESSURE: 65 MMHG | HEART RATE: 70 BPM | TEMPERATURE: 97.5 F | SYSTOLIC BLOOD PRESSURE: 111 MMHG | WEIGHT: 166.67 LBS | BODY MASS INDEX: 26.79 KG/M2 | HEIGHT: 66 IN | OXYGEN SATURATION: 94 %

## 2022-07-06 LAB — GLUCOSE BLDC GLUCOMTR-MCNC: 157 MG/DL (ref 70–99)

## 2022-07-06 PROCEDURE — 99024 POSTOP FOLLOW-UP VISIT: CPT

## 2022-07-06 PROCEDURE — 250N000013 HC RX MED GY IP 250 OP 250 PS 637: Performed by: PHYSICIAN ASSISTANT

## 2022-07-06 PROCEDURE — 250N000011 HC RX IP 250 OP 636: Performed by: PHYSICIAN ASSISTANT

## 2022-07-06 RX ORDER — ASPIRIN 325 MG
325 TABLET ORAL DAILY
Qty: 30 TABLET | Refills: 1 | Status: SHIPPED | OUTPATIENT
Start: 2022-07-07 | End: 2022-09-14

## 2022-07-06 RX ADMIN — ACETAMINOPHEN 975 MG: 325 TABLET ORAL at 02:40

## 2022-07-06 RX ADMIN — CEFAZOLIN 1 G: 1 INJECTION, POWDER, FOR SOLUTION INTRAMUSCULAR; INTRAVENOUS at 02:39

## 2022-07-06 RX ADMIN — ACETAMINOPHEN 975 MG: 325 TABLET ORAL at 09:59

## 2022-07-06 RX ADMIN — LEVOTHYROXINE SODIUM 88 MCG: 88 TABLET ORAL at 08:32

## 2022-07-06 RX ADMIN — POLYETHYLENE GLYCOL 3350 17 G: 17 POWDER, FOR SOLUTION ORAL at 08:32

## 2022-07-06 RX ADMIN — SENNOSIDES AND DOCUSATE SODIUM 1 TABLET: 50; 8.6 TABLET ORAL at 08:32

## 2022-07-06 RX ADMIN — ASPIRIN 325 MG ORAL TABLET 325 MG: 325 PILL ORAL at 08:31

## 2022-07-06 RX ADMIN — FLUTICASONE FUROATE AND VILANTEROL TRIFENATATE 1 PUFF: 100; 25 POWDER RESPIRATORY (INHALATION) at 08:38

## 2022-07-06 RX ADMIN — METOPROLOL SUCCINATE 50 MG: 50 TABLET, EXTENDED RELEASE ORAL at 08:32

## 2022-07-06 RX ADMIN — FAMOTIDINE 20 MG: 20 TABLET ORAL at 08:32

## 2022-07-06 ASSESSMENT — ACTIVITIES OF DAILY LIVING (ADL)
ADLS_ACUITY_SCORE: 20

## 2022-07-06 NOTE — PLAN OF CARE
AXO 4. VSS on RA. Assist of 2. Tolerating clear diet. Pt arrived on floor at 18:00. Neuro's WDL. Lung Sounds clear and diminished in bases, flatus - , BM -, Adequate urine output via frank. Bilateral groin sites WDL except petechiae around both dressings.  Pain managed with scheduled tylenol.  Denies nausea. LR 125ml/hr. Wife at bedside.

## 2022-07-06 NOTE — DISCHARGE INSTRUCTIONS
You can take off the bandages on post-op day 2, 7/7/2022.     You are able to shower, please pat your incisions dry, do not rub to avoid irritation.    Please avoid bodies of water such as hot tubs, baths, lakes and submerging until your incisions are fully healed.     Please call [facility] or your PCP if you notice a fever greater than 101.5, if you have frequent drainage that is new from your incision sites.     Avoid driving if on narcotics, however ok to drive on 7/7. Avoid heavy twisting(ex. Playing golf), or heavy lifting(greater than 20 lb) for the first two weeks after surgery.     You can take acetaminophen for pain.

## 2022-07-06 NOTE — PLAN OF CARE
Goal Outcome Evaluation:        Pt. AXO 4. VSS on RA. Denies pain, SOB or difficult breathing. Neuro`s intact.Lung Sounds clear and diminished in bases. Active BS, no BM at this shift. Adequate UO via frank. Skin- scattered bruises. Palpable LE pulses. Bilateral groin incision sites WDL ex petechiae around the dressings.  Assist of 2. Clear diet, tolerating it.  2 PIV, saline locked.

## 2022-07-06 NOTE — PROGRESS NOTES
"VASCULAR SURGERY PROGRESS NOTE    Subjective:  Doing well. Resting comfortably in bed. Pain well controlled. Eager to go home today    Objective:  Intake/Output Summary (Last 24 hours) at 7/6/2022 0735  Last data filed at 7/6/2022 0600  Gross per 24 hour   Intake 2140 ml   Output 490 ml   Net 1650 ml     PHYSICAL EXAM:  /57   Pulse 66   Temp 97.5  F (36.4  C) (Oral)   Resp 18   Ht 1.676 m (5' 6\")   Wt 75.6 kg (166 lb 10.7 oz)   SpO2 96%   BMI 26.90 kg/m    Well-appearing; no acute distress   Unlabored respirations on room air  Regular rate and rhythm     Soft, non-tender, non-distended  Groin sites are soft; dressing in place - c/d/i     Palpable DP pulses bilaterally       ASSESSMENT:  S/p EVAR (Medtronic graft) with bilateral common femoral artery exposures for 5.5 cm AAA (07/05/2022). No issues post-operatively.       PLAN:  - Discontinue frank catheter  - Regular diet; discontinue IV fluids  - Discharge home today once ambulating and voided     Anthony Joyce MD  Division of Vascular Surgery   Pager: [phone]                  "

## 2022-07-06 NOTE — PLAN OF CARE
A&Ox4, VSS on RA, LS clear, Bowel sounds active, UOP adequate, Incisions (drains, etc): bilateral groin incision sites, dressings CDI, Ambulates IND, Diet Reg, Pain controlled by tylenol, discharge instructions given and questions answered. Pt discharged to home w/ medication. Provider to update pt later on correct aspirin dose.

## 2022-07-06 NOTE — DISCHARGE SUMMARY
Vascular Surgery Discharge Summary    NAME: Rubio Gibbons   MRN: [id number]   : 1945     DATE OF ADMISSION: 2022     PRE/POSTOPERATIVE DIAGNOSES:    From Op Note    PROCEDURES PERFORMED, 2022:   From Op Note    INTRAOPERATIVE FINDINGS: None     POSTOPERATIVE COMPLICATIONS: None    DATE OF DISCHARGE: 2022     HOSPITAL COURSE: Rubio Gibbons is a 77 year old male who on 2022  underwent the above-named procedures. He tolerated the procedure well and postoperatively was transferred to the general post-surgical unit.  The remainder of his course was essentiallly uncomplicated.  Prior to discharge, his pain was controlled well with acetaminophen.  he was able to perform ADLs and ambulate independently without difficulty, and had full return of bowel and bladder function.  On 2022, he was discharged to home in stable condition.    DISCHARGE INSTRUCTIONS:  Copy from AVS    FOLLOW UP APPOINTMENTS:   Copy from AVS    DISCHARGE MEDICATIONS:     Review of your medicines      START taking      Dose / Directions   aspirin 325 MG tablet  Commonly known as: ASA  Used for: Abdominal aortic aneurysm (AAA) >39 mm diameter (H)  Replaces: aspirin 81 MG EC tablet  Notes to patient: Provider will update this afternoon with correct dose. Either home dose of 81 mg or increase to 325 mg.       Dose: 325 mg  Start taking on: 2022  Take 1 tablet (325 mg) by mouth daily  Quantity: 30 tablet  Refills: 1        CONTINUE these medicines which have NOT CHANGED      Dose / Directions   atorvastatin 40 MG tablet  Commonly known as: LIPITOR  Used for: Hyperlipidemia LDL goal <100      Dose: 40 mg  Take 1 tablet (40 mg) by mouth daily  Quantity: 90 tablet  Refills: 3     fluticasone-salmeterol 250-50 MCG/DOSE inhaler  Commonly known as: ADVAIR  Used for: Panlobular emphysema (H)      Dose: 1 puff  Inhale 1 puff into the lungs every 12 hours  Quantity: 3 each  Refills: 3     levothyroxine 88 MCG tablet  Commonly known  as: SYNTHROID/LEVOTHROID  Used for: Hypothyroidism due to acquired atrophy of thyroid      Dose: 88 mcg  Take 1 tablet (88 mcg) by mouth daily  Quantity: 90 tablet  Refills: 3     lisinopril 10 MG tablet  Commonly known as: ZESTRIL  Used for: Essential hypertension with goal blood pressure less than 140/90      Dose: 10 mg  Take 1 tablet (10 mg) by mouth daily  Quantity: 90 tablet  Refills: 1     metoprolol succinate ER 50 MG 24 hr tablet  Commonly known as: TOPROL XL  Used for: Essential hypertension with goal blood pressure less than 140/90      Dose: 50 mg  Take 1 tablet (50 mg) by mouth daily  Quantity: 90 tablet  Refills: 1     Vitamin C Gummies 125 MG Chew  Generic drug: Ascorbic Acid      Dose: 250 mg  Take 250 mg by mouth daily (125MG X 2 = 250MG)  Refills: 0        STOP taking    aspirin 81 MG EC tablet  Commonly known as: ASPIRIN LOW DOSE  Replaced by: aspirin 325 MG tablet              Where to get your medicines      These medications were sent to [facility] - [address] [place] [address]  [address] [place] [facility] [address]    Phone: [phone]     aspirin 325 MG tablet

## 2022-07-06 NOTE — PLAN OF CARE
Smoking cessation orders received, pt is former smoker, per chart quit in 2013. Will complete orders

## 2022-07-08 LAB
ATRIAL RATE - MUSE: 55 BPM
DIASTOLIC BLOOD PRESSURE - MUSE: NORMAL MMHG
INTERPRETATION ECG - MUSE: NORMAL
P AXIS - MUSE: 70 DEGREES
PR INTERVAL - MUSE: 170 MS
QRS DURATION - MUSE: 98 MS
QT - MUSE: 416 MS
QTC - MUSE: 397 MS
R AXIS - MUSE: 50 DEGREES
SYSTOLIC BLOOD PRESSURE - MUSE: NORMAL MMHG
T AXIS - MUSE: 52 DEGREES
VENTRICULAR RATE- MUSE: 55 BPM

## 2022-07-20 ENCOUNTER — OFFICE VISIT (OUTPATIENT)
Dept: OTHER | Facility: CLINIC | Age: 77
End: 2022-07-20
Payer: COMMERCIAL

## 2022-07-20 VITALS — HEART RATE: 63 BPM | SYSTOLIC BLOOD PRESSURE: 143 MMHG | DIASTOLIC BLOOD PRESSURE: 83 MMHG

## 2022-07-20 DIAGNOSIS — I71.40 ABDOMINAL AORTIC ANEURYSM (AAA) WITHOUT RUPTURE (H): Primary | ICD-10-CM

## 2022-07-20 PROCEDURE — 99024 POSTOP FOLLOW-UP VISIT: CPT

## 2022-07-20 PROCEDURE — G0463 HOSPITAL OUTPT CLINIC VISIT: HCPCS

## 2022-07-20 NOTE — PROGRESS NOTES
VASCULAR SURGERY PROGRESS NOTE    [facility]    Rubio Gibbons  Medical Record #:  [id number]  YOB: 1945  Age:  77 year old     Date of Service: 7/20/2022    PRIMARY CARE PROVIDER: Rigo Goddard    Reason for visit:  2 weeks post-op endovascular repair for AAA    IMPRESSION: Mr. Gibbons is a 77 year old male who underwent an EVAR on 7/05 via bilateral common femoral arteries. Today the patient has palpable DP and PT pulses. Reports a good appetite, denies pain. Denies any drainage at the incisional sites. Incision sites are well-healed. Reports energy levels are back to normal.     RECOMMENDATION/RISKS: Pt to have CTA abdomen/pelvis in 3 months with follow-up with Dr. Mays. Patient to continue taking 81 mg of Aspirin.     HPI:  Rubio Gibbons is a 77 year old male with past medical history significant for 5.5 infrarenal AAA. The patient underwent endovascular repair on 7/5/2022. The patient tolerated the procedure well and had an uncomplicated post-operative course. The patient reports overall he is feeling well and back to baseline at this visit.     REVIEW OF SYSTEMS:    A 12 point ROS was reviewed and is negative except for what is listed above in HPI.    PHH:    Past Medical History:   Diagnosis Date     Abdominal aortic aneurysm (AAA) without rupture (H) 10/13/2016     CAD (coronary artery disease)      Coronary artery disease involving native coronary artery of native heart without angina pectoris 10/14/2019    Per Note. Dr. López OV 2-20-13 - to establish care. He has a history of myocardial infarction back in 1998 @ [facility] . Does not remember any details- and no stent.   Nuclear 3-12-13 LVEF 34% to establish care. He has a history of myocardial infarction back in 1998, small to moderately sized partially reversible distal anterior anteroseptal and apical defect, small to moderately sized inferio     COVID-19 virus vaccination declined 2/25/2021     Diabetes  mellitus type 2, uncomplicated (H) 9/22/2016    Diet      WIN (dyspnea on exertion) 11/21/2014     Heart attack (H) 1998     History of myocardial infarction in adulthood 9/22/2016     History of tobacco abuse 9/10/2019     HTN, goal below 140/90 1/28/2014     Hyperlipidemia LDL goal <100 10/31/2010     Hypothyroidism 11/9/2011     Hypothyroidism due to acquired atrophy of thyroid 2/18/2016     Ischemic cardiomyopathy      LBP (low back pain) 11/21/2014     Lumbar radiculopathy 9/10/2019     Microalbuminuria 11/7/2016    X1     Midline low back pain with right-sided sciatica 2/25/2021     Morbid obesity (H) 2/25/2021     Muscle spasm 9/25/2017     Other abnormal glucose 5/8/2014     Panlobular emphysema (H) 9/22/2016     Presbycusis syndrome, bilateral 9/22/2016     Right-sided low back pain without sciatica, unspecified chronicity 3/16/2017     S/P lumbar spinal arthrodesis 9/10/2019     Tobacco use disorder 7/19/2006     Tubular adenoma      Type 2 diabetes mellitus with circulatory disorder, without long-term current use of insulin (H) 9/22/2016    Diet           Past Surgical History:   Procedure Laterality Date     ANGIOPLASTY  1998     COLONOSCOPY N/A 11/17/2016    Procedure: COLONOSCOPY;  Surgeon: Adam Lantigua MD;  Location:  GI     ENDOVASCULAR REPAIR ANEURYSM ABDOMINAL AORTA N/A 7/5/2022    Procedure: BILATERAL FEMORAL ARTERY EXPOSURE, BILATERAL FEMORAL ARTERY REPAIR, CUTDOWN APPROACH ENDOVASCULAR ABDOMINAL AORTA ANEURYSM REPAIR WITH MEDTRONIC STENT GRAFTS;  Surgeon: Ayush Mays MD;  Location: [facility] OR     IR ABDOMINAL ENDOVASCULAR STENT GRAFT  7/5/2022     OPTICAL TRACKING SYSTEM FUSION SPINE POSTERIOR LUMBAR TWO LEVELS N/A 10/28/2019    Procedure: L5 Herrera-Talavera osteotomy and L4-5 bilateral decompressive laminectomy with L5-S1 transforaminal lumbar interbody fusion and  L4-S1 posterior instrumented fusion;  Surgeon: Phillip Beavers MD;  Location:  OR       ALLERGIES:  Chantix  [varenicline]    MEDS:    Current Outpatient Medications:      Ascorbic Acid (VITAMIN C GUMMIES) 125 MG CHEW, Take 250 mg by mouth daily (125MG X 2 = 250MG), Disp: , Rfl:      aspirin (ASA) 325 MG tablet, Take 1 tablet (325 mg) by mouth daily, Disp: 30 tablet, Rfl: 1     atorvastatin (LIPITOR) 40 MG tablet, Take 1 tablet (40 mg) by mouth daily, Disp: 90 tablet, Rfl: 3     fluticasone-salmeterol (ADVAIR) 250-50 MCG/DOSE inhaler, Inhale 1 puff into the lungs every 12 hours, Disp: 3 each, Rfl: 3     levothyroxine (SYNTHROID/LEVOTHROID) 88 MCG tablet, Take 1 tablet (88 mcg) by mouth daily, Disp: 90 tablet, Rfl: 3     lisinopril (ZESTRIL) 10 MG tablet, Take 1 tablet (10 mg) by mouth daily, Disp: 90 tablet, Rfl: 1     metoprolol succinate ER (TOPROL-XL) 50 MG 24 hr tablet, Take 1 tablet (50 mg) by mouth daily, Disp: 90 tablet, Rfl: 1    SOCIAL HABITS:    History   Smoking Status     Former Smoker     Packs/day: 0.25     Years: 40.00     Types: Cigarettes   Smokeless Tobacco     Never Used     Comment: last one was 10/13 (trying to quit)     Social History    Substance and Sexual Activity      Alcohol use: No      History   Drug Use No       FAMILY HISTORY:    Family History   Problem Relation Age of Onset     Cardiovascular Father         heart attack     Coronary Artery Disease Father      Cerebrovascular Disease Father      Cardiovascular Brother         heart attack     Cardiovascular Sister         2 sisters - minor cardiac problems- pt unsure of the exact nature       PE:  BP (!) 143/83 (BP Location: Right arm, Patient Position: Chair, Cuff Size: Adult Regular)   Pulse 63   Wt Readings from Last 1 Encounters:   07/06/22 166 lb 10.7 oz (75.6 kg)     There is no height or weight on file to calculate BMI.    DIAGNOSTIC STUDIES:     Images:  XR Chest Port 1 View    Result Date: 7/5/2022  CHEST ONE VIEW  7/5/2022 9:50 AM HISTORY: Preop AAA surgery. COMPARISON: March 16, 2022     IMPRESSION: No significant change in  minimal fibrosis at the left base. No acute infiltrates. ELADIO ROQUE MD   SYSTEM ID:  [id number]    IR Abdominal Endovascular Stent Graft    Result Date: 7/6/2022  INTERVENTIONAL RADIOLOGY ENDOVASCULAR STENT GRAFT July 5, 2022 at 1413 hours HISTORY: 77-year-old patient with infrarenal abdominal aortic aneurysm measuring 5.5 cm based on CT examination performed April 6, 2022. Plan is for endovascular repair, with note made of tortuous aortic aneurysm. TECHNIQUE: Please note this examination was performed with Dr. Ayush Mays of vascular surgery service. Please see separate note for details. Dr. Mays and vascular team performed surgical cutdowns to both common femoral arteries, please see separate note for details. Once both common femoral arteries were exposed, 18-gauge needles were used to access the common femoral arteries and over a series of maneuvers, 6 [place] citizen sheaths were placed. Through each access, a Korbitec catheter was used to navigate through tortuous iliac arterial systems, abdominal aorta, and into the aortic arch. Lunderquist wire was placed from the right groin access. Pigtail catheter was advanced through the abdominal aorta at the level of the renal arteries. Angiogram was performed. A Medtronic Endurant main body was then deployed, 25 x 14 x 103. Left renal artery is the slightly inferior artery, and the stent graft was deployed until contralateral limb was exposed. Angiogram was performed from the left groin 6 [place] citizen sheath to determine origin of the internal iliac artery, which was identified, though chronically occluded shortly beyond the origin. A contralateral limb was deployed, 16 x 20 x 124, after which a 14 [place] citizen sheath was placed. Pigtail catheter was then placed from the right groin where angiogram was performed, also with visualization of the internal iliac artery, though chronically occluded. Ipsilateral right 16 x 20 x 124 limb was deployed. A Reliant balloon was used throughout  the main body of the graft and both limits. Completion angiogram performed demonstrating patent aortobiiliac stent graft. No visible endoleak. 12 mm balloon angioplasty also performed in the left limb given tortuosity and stenosis on CT exam. Heparin was administered during the procedure, as was an 8000 unit bolus. Patient's vital signs were monitored and remained stable throughout the procedure. Dr. Mays and surgical team repaired both surgical cutdowns after completion of the procedure, please see separate note for details. Anesthesia: Please see separate anesthesiology notes for details. Fluoroscopic time: 20.8 minutes. Air Kerma: 870 mGy. Contrast: 55 mL of Isovue administered intra-arterially without complication. FINDINGS: A total of 13 spot fluoroscopic images in angiogram sequences obtained throughout the procedure. Abdominal aortic angiogram performed at the level of the renal arteries with main body stent graft in place, though not deployed. Additional angiogram performed as the stent graft began being deployed. Once the main body was deployed, left iliac angiogram performed, followed by right iliac angiogram after deployment of contralateral and ipsilateral limbs. Images then performed as balloon was inflated throughout the length of both the main body and limbs of the stent graft. 12 mm balloon angioplasty then performed in the left limb with completion angiogram.     IMPRESSION: 1. Successful deployment of Medtronic Endurant stent graft, main body 25 x 14 x 103, contralateral left limb 16 x 20 x 124, Ipsilateral right limb 16 x 20 x 124. No visible endoleak at completion. 2. Left common iliac artery angioplasty with 12 mm x 4 cm [place] balloon. Again, please see separate report by Dr. Mays. ROLAND DUPONT MD   SYSTEM ID:  [id number]    LABS:      Sodium   Date Value Ref Range Status   06/30/2022 138 133 - 144 mmol/L Final   03/16/2022 139 133 - 144 mmol/L Final   08/14/2020 137 133 - 144 mmol/L Final    09/10/2019 136 133 - 144 mmol/L Final   07/26/2018 139 133 - 144 mmol/L Final     Urea Nitrogen   Date Value Ref Range Status   06/30/2022 21 7 - 30 mg/dL Final   03/16/2022 21 7 - 30 mg/dL Final   08/14/2020 22 7 - 30 mg/dL Final   09/10/2019 16 7 - 30 mg/dL Final   07/26/2018 15 7 - 30 mg/dL Final     Hemoglobin   Date Value Ref Range Status   07/05/2022 12.5 (L) 13.3 - 17.7 g/dL Final   06/30/2022 13.6 13.3 - 17.7 g/dL Final   10/10/2019 14.7 13.3 - 17.7 g/dL Final   01/30/2013 15.5 13.3 - 17.7 g/dL Final   07/16/2009 16.0 13.3 - 17.7 g/dL Final     Platelet Count   Date Value Ref Range Status   06/30/2022 188 150 - 450 10e3/uL Final   10/10/2019 189 150 - 450 10e9/L Final   01/30/2013 206 150 - 450 10e9/L Final   07/16/2009 206 150 - 450 10e9/L Final     INR   Date Value Ref Range Status   10/10/2019 1.13 0.86 - 1.14 Final       30 minutes spent on the day of encounter doing chart review, history and exam, documentation, and further activities as noted.     Kelsey Morgan, NP  VASCULAR SURGERY

## 2022-07-20 NOTE — PROGRESS NOTES
[facility]        Patient is here for a  follow up.   Pt is currently taking Aspirin and Statin.    BP (!) 143/83 (BP Location: Right arm, Patient Position: Chair, Cuff Size: Adult Regular)   Pulse 63     The provider has been notified that the patient has no concerns.     Questions patient would like addressed today are: N/A.    Refills are needed: N/A    Has homecare services and agency name:  Tisha Pate MA

## 2022-08-23 NOTE — RESULT ENCOUNTER NOTE
Tests are all satisfactory  ROLAND BRAXTON   Mohs Rapid Report Verbiage: The area of clinically evident tumor was marked with skin marking ink and appropriately hatched.  The initial incision was made following the Mohs approach through the skin.  The specimen was taken to the lab, divided into the necessary number of pieces, chromacoded and processed according to the Mohs protocol.  This was repeated in successive stages until a tumor free defect was achieved.

## 2022-09-14 ENCOUNTER — OFFICE VISIT (OUTPATIENT)
Dept: FAMILY MEDICINE | Facility: CLINIC | Age: 77
End: 2022-09-14
Payer: COMMERCIAL

## 2022-09-14 VITALS
SYSTOLIC BLOOD PRESSURE: 118 MMHG | DIASTOLIC BLOOD PRESSURE: 68 MMHG | WEIGHT: 173.2 LBS | OXYGEN SATURATION: 96 % | BODY MASS INDEX: 27.96 KG/M2 | RESPIRATION RATE: 14 BRPM | HEART RATE: 60 BPM | TEMPERATURE: 98 F

## 2022-09-14 DIAGNOSIS — I71.40 ABDOMINAL AORTIC ANEURYSM (AAA) >39 MM DIAMETER (H): Primary | ICD-10-CM

## 2022-09-14 DIAGNOSIS — I10 ESSENTIAL HYPERTENSION WITH GOAL BLOOD PRESSURE LESS THAN 140/90: ICD-10-CM

## 2022-09-14 DIAGNOSIS — E11.59 TYPE 2 DIABETES MELLITUS WITH OTHER CIRCULATORY COMPLICATION, WITHOUT LONG-TERM CURRENT USE OF INSULIN (H): ICD-10-CM

## 2022-09-14 LAB — HBA1C MFR BLD: 5.9 % (ref 0–5.6)

## 2022-09-14 PROCEDURE — 99214 OFFICE O/P EST MOD 30 MIN: CPT | Performed by: FAMILY MEDICINE

## 2022-09-14 PROCEDURE — 83036 HEMOGLOBIN GLYCOSYLATED A1C: CPT | Performed by: FAMILY MEDICINE

## 2022-09-14 PROCEDURE — 36415 COLL VENOUS BLD VENIPUNCTURE: CPT | Performed by: FAMILY MEDICINE

## 2022-09-14 RX ORDER — METOPROLOL SUCCINATE 50 MG/1
50 TABLET, EXTENDED RELEASE ORAL DAILY
Qty: 90 TABLET | Refills: 3 | Status: SHIPPED | OUTPATIENT
Start: 2022-09-14 | End: 2023-10-05

## 2022-09-14 RX ORDER — LISINOPRIL 10 MG/1
10 TABLET ORAL DAILY
Qty: 90 TABLET | Refills: 1 | Status: SHIPPED | OUTPATIENT
Start: 2022-09-14 | End: 2023-04-04

## 2022-09-14 NOTE — PROGRESS NOTES
"  Assessment & Plan   Problem List Items Addressed This Visit     Abdominal aortic aneurysm (AAA) >39 mm diameter (H) - Primary     Status post endovascular procedure, he is delighted.  Vascular surgery notes reviewed           Essential hypertension with goal blood pressure less than 140/90     Well-controlled           Relevant Medications    lisinopril (ZESTRIL) 10 MG tablet    metoprolol succinate ER (TOPROL XL) 50 MG 24 hr tablet    Type 2 diabetes mellitus with circulatory disorder, without long-term current use of insulin (H)     Periodic assessment due soon.           Relevant Orders    Adult Eye  Referral    Hemoglobin A1c (Completed)                    BMI:   Estimated body mass index is 27.96 kg/m  as calculated from the following:    Height as of 7/5/22: 1.676 m (5' 6\").    Weight as of this encounter: 78.6 kg (173 lb 3.2 oz).           Return for lab go.   Follow-up Visit   Expected date:  Mar 31, 2023 (Approximate)      Follow Up Appointment Details:     Follow-up with whom?: Me    Follow-Up for what?: Medicare Wellness    Any Additional Chronic Condition Management?: Hypertension    Welcome or Annual?: Annual Wellness    How?: In Person    Is this an as-needed follow-up?: No                    Rigo Goddard MD  [facility] FLOYD Bergeron is a 77 year old presenting for the following health issues:  Hypertension and Lipids      HPI     Hyperlipidemia Follow-Up      Are you regularly taking any medication or supplement to lower your cholesterol?   Yes- Atorvastatin    Are you having muscle aches or other side effects that you think could be caused by your cholesterol lowering medication?  No    Hypertension Follow-up      Do you check your blood pressure regularly outside of the clinic? No     Are you following a low salt diet? No    Are your blood pressures ever more than 140 on the top number (systolic) OR more   than 90 on the bottom number (diastolic), for " example 140/90? Does not check    Pt had a stent placed surgery on  07/05/2022        Review of Systems   He feels great      Objective    /68 (BP Location: Right arm, Patient Position: Sitting, Cuff Size: Adult Regular)   Pulse 60   Temp 98  F (36.7  C) (Oral)   Resp 14   Wt 78.6 kg (173 lb 3.2 oz)   SpO2 96%   BMI 27.96 kg/m    Body mass index is 27.96 kg/m .  Physical Exam     Regular cardiac rhythm  I detect no murmur  Abdomen soft  No edema  Rigo Goddard MD

## 2022-10-20 ENCOUNTER — HOSPITAL ENCOUNTER (OUTPATIENT)
Dept: CT IMAGING | Facility: CLINIC | Age: 77
Discharge: HOME | End: 2022-10-20
Payer: COMMERCIAL

## 2022-10-20 DIAGNOSIS — I71.40 ABDOMINAL AORTIC ANEURYSM (AAA) WITHOUT RUPTURE (H): ICD-10-CM

## 2022-10-20 LAB
CREAT BLD-MCNC: 1.1 MG/DL (ref 0.7–1.3)
GFR SERPL CREATININE-BSD FRML MDRD: >60 ML/MIN/1.73M2

## 2022-10-20 PROCEDURE — 82565 ASSAY OF CREATININE: CPT

## 2022-10-20 PROCEDURE — 74174 CTA ABD&PLVS W/CONTRAST: CPT

## 2022-10-20 PROCEDURE — 250N000011 HC RX IP 250 OP 636

## 2022-10-20 RX ORDER — IOPAMIDOL 755 MG/ML
80 INJECTION, SOLUTION INTRAVASCULAR ONCE
Status: COMPLETED | OUTPATIENT
Start: 2022-10-20 | End: 2022-10-20

## 2022-10-20 RX ADMIN — IOPAMIDOL 80 ML: 755 INJECTION, SOLUTION INTRAVENOUS at 08:54

## 2022-10-26 ENCOUNTER — OFFICE VISIT (OUTPATIENT)
Dept: OTHER | Facility: CLINIC | Age: 77
End: 2022-10-26
Attending: SURGERY
Payer: COMMERCIAL

## 2022-10-26 VITALS — HEART RATE: 60 BPM | DIASTOLIC BLOOD PRESSURE: 78 MMHG | SYSTOLIC BLOOD PRESSURE: 131 MMHG

## 2022-10-26 DIAGNOSIS — Z95.828 HISTORY OF ENDOVASCULAR STENT GRAFT FOR ABDOMINAL AORTIC ANEURYSM (AAA): Primary | ICD-10-CM

## 2022-10-26 PROCEDURE — G0463 HOSPITAL OUTPT CLINIC VISIT: HCPCS

## 2022-10-26 PROCEDURE — 99213 OFFICE O/P EST LOW 20 MIN: CPT | Performed by: SURGERY

## 2022-10-26 NOTE — PROGRESS NOTES
[facility]        Patient is here for a  follow up.     Pt is currently taking Aspirin and Statin.    /78 (BP Location: Right arm, Patient Position: Chair, Cuff Size: Adult Regular)   Pulse 60     The provider has been notified that the patient has no concerns.     Questions patient would like addressed today are: N/A.    Refills are needed: N/A    Has homecare services and agency name:  Tisha Pate MA

## 2022-10-26 NOTE — PROGRESS NOTES
Rubio Gibbons is a 77-year-old gentleman who is status post EVAR of a 5.5 cm AAA utilizing a Medtronic 25 mm bifurcated device on 7/5/2022.  There were some anatomic features which were concerning for EVAR including some mild ectasia at the level of the renal arteries with some posterior mural thrombus.  Ultimately, we chose to pursue EVAR and we were careful to avoid post procedural angioplasty at the proximal aortic seal zone.  Rubio has done well and presents today for 3-month follow-up including EVAR CTA.    At today's visit he arrives with his wife.  He is in good spirits.  Apart from some mild paresthesias on the medial aspect of the proximal left thigh, he is entirely without complaints.    Exam:  Well-developed male alert and oriented x3.  Blood pressure 131/78 with a pulse of 60.  Bilateral groin incisions are well-healed.  2+ palpable PT pulses bilaterally.    Imaging:  EVAR CTA from 10/20/2022 shows a well-positioned endograft with no evidence of endoleak.  Aneurysmal sac size is slightly diminished at 5.3 cm.    ASSESSMENT:  3 months status post EVAR clinically doing well with postprocedural CTA findings as noted above.    RECOMMENDATION:  He will continue his medical regimen including daily aspirin.  We discussed prophylactic amoxicillin prior to dental procedures.  I have no vascular concerns.  Follow-up will be with me in 1 year for an EVAR CTA as part of my surveillance protocol.    Total length of this encounter was 20 minutes.    Konrad Mays MD

## 2022-10-28 NOTE — ADDENDUM NOTE
Addended by: ROSSY TOPETE on: 10/28/2022 08:37 AM     Modules accepted: Orders     Patient seen at bedside this morning, reports he is feeling much better, denies any active pain, fever, chills, n/v or pain.     PE  General: No distress, No anxiety  VITALS  T(C): 37.1 (04-07-22 @ 08:40), Max: 37.2 (04-06-22 @ 17:58)  HR: 59 (04-07-22 @ 08:40) (59 - 95)  BP: 106/64 (04-07-22 @ 08:40) (106/64 - 132/90)  RR: 18 (04-07-22 @ 08:40) (13 - 18)  SpO2: 97% (04-07-22 @ 08:40) (97% - 100%)            Skin     : No jaundice   HEENT: Normocephalic, no icterus , EOM full , No epistaxis  Lung    : No resp distress  Abdo:   : Soft, Non tender, No guarding, No distension   Back    : No CVAT b/l  Extremity: No calf tenderness   :  No Kramer  Neuro   : A&Ox3      LABS                          13.5   11.39 )-----------( 216      ( 06 Apr 2022 19:45 )             39.3     04-07    141  |  112<H>  |  20  ----------------------------<  88  3.7   |  26  |  1.39<H>    Ca    8.1<L>      07 Apr 2022 08:10  Mg     1.9     04-06    TPro  8.0  /  Alb  4.3  /  TBili  1.2  /  DBili  x   /  AST  37  /  ALT  91<H>  /  AlkPhos  89  04-06

## 2023-03-15 ENCOUNTER — TRANSFERRED RECORDS (OUTPATIENT)
Dept: MULTI SPECIALTY CLINIC | Facility: CLINIC | Age: 78
End: 2023-03-15
Payer: COMMERCIAL

## 2023-03-15 LAB — RETINOPATHY: NORMAL

## 2023-03-23 DIAGNOSIS — E03.4 HYPOTHYROIDISM DUE TO ACQUIRED ATROPHY OF THYROID: ICD-10-CM

## 2023-03-27 RX ORDER — LEVOTHYROXINE SODIUM 88 UG/1
88 TABLET ORAL DAILY
Qty: 90 TABLET | Refills: 3 | Status: SHIPPED | OUTPATIENT
Start: 2023-03-27 | End: 2024-03-18

## 2023-04-02 ENCOUNTER — HEALTH MAINTENANCE LETTER (OUTPATIENT)
Age: 78
End: 2023-04-02

## 2023-04-04 ENCOUNTER — OFFICE VISIT (OUTPATIENT)
Dept: FAMILY MEDICINE | Facility: CLINIC | Age: 78
End: 2023-04-04
Payer: COMMERCIAL

## 2023-04-04 VITALS
DIASTOLIC BLOOD PRESSURE: 77 MMHG | WEIGHT: 173 LBS | HEIGHT: 64 IN | SYSTOLIC BLOOD PRESSURE: 160 MMHG | BODY MASS INDEX: 29.53 KG/M2 | RESPIRATION RATE: 16 BRPM | TEMPERATURE: 97.5 F | OXYGEN SATURATION: 99 % | HEART RATE: 54 BPM

## 2023-04-04 DIAGNOSIS — J43.1 PANLOBULAR EMPHYSEMA (H): ICD-10-CM

## 2023-04-04 DIAGNOSIS — R06.09 DOE (DYSPNEA ON EXERTION): ICD-10-CM

## 2023-04-04 DIAGNOSIS — L03.119 CELLULITIS AND ABSCESS OF LEG: ICD-10-CM

## 2023-04-04 DIAGNOSIS — E03.4 HYPOTHYROIDISM DUE TO ACQUIRED ATROPHY OF THYROID: ICD-10-CM

## 2023-04-04 DIAGNOSIS — L02.419 CELLULITIS AND ABSCESS OF LEG: ICD-10-CM

## 2023-04-04 DIAGNOSIS — I10 ESSENTIAL HYPERTENSION WITH GOAL BLOOD PRESSURE LESS THAN 140/90: ICD-10-CM

## 2023-04-04 DIAGNOSIS — R49.9 VOICE DISORDER: ICD-10-CM

## 2023-04-04 DIAGNOSIS — R05.3 CHRONIC COUGH: ICD-10-CM

## 2023-04-04 DIAGNOSIS — I25.10 CORONARY ARTERY DISEASE INVOLVING NATIVE CORONARY ARTERY OF NATIVE HEART WITHOUT ANGINA PECTORIS: ICD-10-CM

## 2023-04-04 DIAGNOSIS — E78.5 HYPERLIPIDEMIA LDL GOAL <100: ICD-10-CM

## 2023-04-04 DIAGNOSIS — Z86.79 HISTORY OF ABDOMINAL AORTIC ANEURYSM (AAA): ICD-10-CM

## 2023-04-04 DIAGNOSIS — E11.59 TYPE 2 DIABETES MELLITUS WITH OTHER CIRCULATORY COMPLICATION, WITHOUT LONG-TERM CURRENT USE OF INSULIN (H): Primary | ICD-10-CM

## 2023-04-04 LAB — HBA1C MFR BLD: 7.6 % (ref 0–5.6)

## 2023-04-04 PROCEDURE — 36415 COLL VENOUS BLD VENIPUNCTURE: CPT | Performed by: FAMILY MEDICINE

## 2023-04-04 PROCEDURE — 99214 OFFICE O/P EST MOD 30 MIN: CPT | Performed by: FAMILY MEDICINE

## 2023-04-04 PROCEDURE — 84443 ASSAY THYROID STIM HORMONE: CPT | Performed by: FAMILY MEDICINE

## 2023-04-04 PROCEDURE — 82043 UR ALBUMIN QUANTITATIVE: CPT | Performed by: FAMILY MEDICINE

## 2023-04-04 PROCEDURE — 80061 LIPID PANEL: CPT | Performed by: FAMILY MEDICINE

## 2023-04-04 PROCEDURE — 83036 HEMOGLOBIN GLYCOSYLATED A1C: CPT | Performed by: FAMILY MEDICINE

## 2023-04-04 PROCEDURE — 84439 ASSAY OF FREE THYROXINE: CPT | Performed by: FAMILY MEDICINE

## 2023-04-04 PROCEDURE — 82570 ASSAY OF URINE CREATININE: CPT | Performed by: FAMILY MEDICINE

## 2023-04-04 PROCEDURE — 80053 COMPREHEN METABOLIC PANEL: CPT | Performed by: FAMILY MEDICINE

## 2023-04-04 RX ORDER — CLINDAMYCIN HCL 300 MG
300 CAPSULE ORAL 3 TIMES DAILY
Qty: 30 CAPSULE | Refills: 0 | Status: SHIPPED | OUTPATIENT
Start: 2023-04-04 | End: 2023-10-05

## 2023-04-04 RX ORDER — FLUTICASONE PROPIONATE AND SALMETEROL 250; 50 UG/1; UG/1
1 POWDER RESPIRATORY (INHALATION) 2 TIMES DAILY
Qty: 3 EACH | Refills: 3 | Status: SHIPPED | OUTPATIENT
Start: 2023-04-04 | End: 2024-03-26

## 2023-04-04 RX ORDER — ATORVASTATIN CALCIUM 40 MG/1
40 TABLET, FILM COATED ORAL DAILY
Qty: 90 TABLET | Refills: 3 | Status: SHIPPED | OUTPATIENT
Start: 2023-04-04 | End: 2024-03-26

## 2023-04-04 RX ORDER — LISINOPRIL 20 MG/1
20 TABLET ORAL DAILY
Qty: 90 TABLET | Refills: 1 | Status: SHIPPED | OUTPATIENT
Start: 2023-04-04 | End: 2023-10-05 | Stop reason: DRUGHIGH

## 2023-04-04 ASSESSMENT — PAIN SCALES - GENERAL: PAINLEVEL: NO PAIN (0)

## 2023-04-04 NOTE — PROGRESS NOTES
Assessment & Plan   Problem List Items Addressed This Visit     Cellulitis and abscess of leg     Patient reports a second episode of a lump occurring on his inner thighs.  The first right-sided, he squeezed and it went away.  Now he has 1 in his left thigh.  Exam shows subcutaneous mobile lump with overlying erythema no fluctuance about one third down the thigh without palpable tracts.  This does not appear to be a colonic source, rather skin.  Treat as such.  Recommend he not squeeze this.  Indications and timeframe for reinspection discussed.  If this resolves, he need not return         Relevant Medications    clindamycin (CLEOCIN) 300 MG capsule    Coronary artery disease involving native coronary artery of native heart without angina pectoris     Asymptomatic.  Aspirin statinBeta-blocker ACE indefinitely         Relevant Orders    Lipid panel reflex to direct LDL Non-fasting    Cough     He considers this reduced         Relevant Medications    fluticasone-salmeterol (ADVAIR DISKUS) 250-50 MCG/ACT inhaler    WIN (dyspnea on exertion)     Unchanged         Relevant Medications    fluticasone-salmeterol (ADVAIR DISKUS) 250-50 MCG/ACT inhaler    Essential hypertension with goal blood pressure less than 140/90     Slight systolic elevation.  We shall increase lisinopril         Relevant Medications    lisinopril (ZESTRIL) 20 MG tablet    Other Relevant Orders    COMPREHENSIVE METABOLIC PANEL    History of abdominal aortic aneurysm (AAA)     Status post endovascular repair         HYPERLIPIDEMIA LDL GOAL <100     Statin therapy.  Continue         Relevant Medications    atorvastatin (LIPITOR) 40 MG tablet    Hypothyroidism due to acquired atrophy of thyroid     Treated.   yearly TSH         Relevant Orders    TSH WITH FREE T4 REFLEX    Panlobular emphysema (H)     Treated.  Asymptomatic         Relevant Medications    fluticasone-salmeterol (ADVAIR DISKUS) 250-50 MCG/ACT inhaler    Type 2 diabetes mellitus with  "circulatory disorder, without long-term current use of insulin (H) - Primary     Dietary control.  Assess         Relevant Orders    HEMOGLOBIN A1C (Completed)    Albumin Random Urine Quantitative with Creat Ratio    FOOT EXAM (Completed)    Voice disorder     He is developing a \"gravelly\" voice.  Long history of smoking.  This needs vocal cord inspection.  Refer         Relevant Orders    Adult ENT  Referral                 BMI:   Estimated body mass index is 29.7 kg/m  as calculated from the following:    Height as of this encounter: 1.626 m (5' 4\").    Weight as of this encounter: 78.5 kg (173 lb).           Rigo Goddard MD  [facility] FLOYD Bergeron is a 78 year old, presenting for the following health issues:  Refill Request         View : No data to display.              HPI     Diabetes Follow-up      How often are you checking your blood sugar? Not at all    What concerns do you have today about your diabetes? None     Do you have any of these symptoms? (Select all that apply)  No numbness or tingling in feet.  No redness, sores or blisters on feet.  No complaints of excessive thirst.  No reports of blurry vision.  No significant changes to weight.    Have you had a diabetic eye exam in the last 12 months? Yes- Date of last eye exam: 03/15/2023,  Location: [facility] in [facility]        Hyperlipidemia Follow-Up      Are you regularly taking any medication or supplement to lower your cholesterol?   Yes- Atorvastatin    Are you having muscle aches or other side effects that you think could be caused by your cholesterol lowering medication?  Yes- not sure if its medication related    Hypertension Follow-up      Do you check your blood pressure regularly outside of the clinic?      Are you following a low salt diet? No    Are your blood pressures ever more than 140 on the top number (systolic) OR more   than 90 on the bottom number (diastolic), for example 140/90? " "No    BP Readings from Last 2 Encounters:   04/04/23 (!) 160/77   10/26/22 131/78     Hemoglobin A1C (%)   Date Value   04/04/2023 7.6 (H)   09/14/2022 5.9 (H)   02/25/2021 7.6 (H)   08/14/2020 7.2 (H)     LDL Cholesterol Calculated (mg/dL)   Date Value   03/16/2022 58   08/14/2020 67   09/10/2019 68         How many servings of fruits and vegetables do you eat daily?  2-3    On average, how many sweetened beverages do you drink each day (Examples: soda, juice, sweet tea, etc.  Do NOT count diet or artificially sweetened beverages)?   0    How many days per week do you exercise enough to make your heart beat faster? 5    How many minutes a day do you exercise enough to make your heart beat faster? 30 - 60    How many days per week do you miss taking your medication? 0          Review of Systems   As described      Objective    BP (!) 160/77   Pulse 54   Temp 97.5  F (36.4  C) (Oral)   Resp 16   Ht 1.626 m (5' 4\")   Wt 78.5 kg (173 lb)   SpO2 99%   BMI 29.70 kg/m    Body mass index is 29.7 kg/m .  Physical Exam   Chest clear  Leg lesion as described  Feet warm dry skin intact no palpable pulses          Rigo Goddard MD              "

## 2023-04-05 PROBLEM — Z11.59 NEED FOR HEPATITIS C SCREENING TEST: Status: RESOLVED | Noted: 2021-02-25 | Resolved: 2023-04-05

## 2023-04-05 PROBLEM — L82.0 INFLAMED SEBORRHEIC KERATOSIS: Status: RESOLVED | Noted: 2021-02-25 | Resolved: 2023-04-05

## 2023-04-05 PROBLEM — L03.119 CELLULITIS AND ABSCESS OF LEG: Status: ACTIVE | Noted: 2023-04-05

## 2023-04-05 PROBLEM — L02.419 CELLULITIS AND ABSCESS OF LEG: Status: ACTIVE | Noted: 2023-04-05

## 2023-04-05 LAB
ALBUMIN SERPL BCG-MCNC: 4.2 G/DL (ref 3.5–5.2)
ALP SERPL-CCNC: 88 U/L (ref 40–129)
ALT SERPL W P-5'-P-CCNC: 17 U/L (ref 10–50)
ANION GAP SERPL CALCULATED.3IONS-SCNC: 11 MMOL/L (ref 7–15)
AST SERPL W P-5'-P-CCNC: 25 U/L (ref 10–50)
BILIRUB SERPL-MCNC: 0.2 MG/DL
BUN SERPL-MCNC: 23.8 MG/DL (ref 8–23)
CALCIUM SERPL-MCNC: 9.7 MG/DL (ref 8.8–10.2)
CHLORIDE SERPL-SCNC: 104 MMOL/L (ref 98–107)
CHOLEST SERPL-MCNC: 133 MG/DL
CREAT SERPL-MCNC: 1.11 MG/DL (ref 0.67–1.17)
CREAT UR-MCNC: 34.8 MG/DL
DEPRECATED HCO3 PLAS-SCNC: 26 MMOL/L (ref 22–29)
GFR SERPL CREATININE-BSD FRML MDRD: 68 ML/MIN/1.73M2
GLUCOSE SERPL-MCNC: 128 MG/DL (ref 70–99)
HDLC SERPL-MCNC: 41 MG/DL
LDLC SERPL CALC-MCNC: 60 MG/DL
MICROALBUMIN UR-MCNC: <12 MG/L
MICROALBUMIN/CREAT UR: NORMAL MG/G{CREAT}
NONHDLC SERPL-MCNC: 92 MG/DL
POTASSIUM SERPL-SCNC: 5.1 MMOL/L (ref 3.4–5.3)
PROT SERPL-MCNC: 6.8 G/DL (ref 6.4–8.3)
SODIUM SERPL-SCNC: 141 MMOL/L (ref 136–145)
T4 FREE SERPL-MCNC: 0.92 NG/DL (ref 0.9–1.7)
TRIGL SERPL-MCNC: 159 MG/DL
TSH SERPL DL<=0.005 MIU/L-ACNC: 6.24 UIU/ML (ref 0.3–4.2)

## 2023-04-05 NOTE — ASSESSMENT & PLAN NOTE
"He is developing a \"gravelly\" voice.  Long history of smoking.  This needs vocal cord inspection.  Refer  "

## 2023-04-05 NOTE — ASSESSMENT & PLAN NOTE
Patient reports a second episode of a lump occurring on his inner thighs.  The first right-sided, he squeezed and it went away.  Now he has 1 in his left thigh.  Exam shows subcutaneous mobile lump with overlying erythema no fluctuance about one third down the thigh without palpable tracts.  This does not appear to be a colonic source, rather skin.  Treat as such.  Recommend he not squeeze this.  Indications and timeframe for reinspection discussed.  If this resolves, he need not return

## 2023-04-06 NOTE — RESULT ENCOUNTER NOTE
This is OK. Diabetes is creeping up, thyroid off a bit. Let me see you again in 3 months or so  Rigo Goddard MD

## 2023-06-01 ENCOUNTER — HEALTH MAINTENANCE LETTER (OUTPATIENT)
Age: 78
End: 2023-06-01

## 2023-10-05 ENCOUNTER — OFFICE VISIT (OUTPATIENT)
Dept: FAMILY MEDICINE | Facility: CLINIC | Age: 78
End: 2023-10-05
Payer: COMMERCIAL

## 2023-10-05 VITALS
DIASTOLIC BLOOD PRESSURE: 60 MMHG | WEIGHT: 172 LBS | TEMPERATURE: 98.6 F | HEIGHT: 64 IN | OXYGEN SATURATION: 94 % | BODY MASS INDEX: 29.37 KG/M2 | HEART RATE: 60 BPM | RESPIRATION RATE: 16 BRPM | SYSTOLIC BLOOD PRESSURE: 100 MMHG

## 2023-10-05 DIAGNOSIS — L57.0 ACTINIC KERATOSIS: ICD-10-CM

## 2023-10-05 DIAGNOSIS — I10 ESSENTIAL HYPERTENSION WITH GOAL BLOOD PRESSURE LESS THAN 140/90: ICD-10-CM

## 2023-10-05 DIAGNOSIS — J43.1 PANLOBULAR EMPHYSEMA (H): ICD-10-CM

## 2023-10-05 DIAGNOSIS — E03.4 HYPOTHYROIDISM DUE TO ACQUIRED ATROPHY OF THYROID: ICD-10-CM

## 2023-10-05 DIAGNOSIS — E11.59 TYPE 2 DIABETES MELLITUS WITH OTHER CIRCULATORY COMPLICATION, WITHOUT LONG-TERM CURRENT USE OF INSULIN (H): Primary | ICD-10-CM

## 2023-10-05 LAB — HBA1C MFR BLD: 7.2 % (ref 0–5.6)

## 2023-10-05 PROCEDURE — 84439 ASSAY OF FREE THYROXINE: CPT | Performed by: FAMILY MEDICINE

## 2023-10-05 PROCEDURE — 36415 COLL VENOUS BLD VENIPUNCTURE: CPT | Performed by: FAMILY MEDICINE

## 2023-10-05 PROCEDURE — 99214 OFFICE O/P EST MOD 30 MIN: CPT | Performed by: FAMILY MEDICINE

## 2023-10-05 PROCEDURE — 83036 HEMOGLOBIN GLYCOSYLATED A1C: CPT | Performed by: FAMILY MEDICINE

## 2023-10-05 PROCEDURE — 84443 ASSAY THYROID STIM HORMONE: CPT | Performed by: FAMILY MEDICINE

## 2023-10-05 RX ORDER — METOPROLOL SUCCINATE 50 MG/1
50 TABLET, EXTENDED RELEASE ORAL DAILY
Qty: 90 TABLET | Refills: 3 | Status: SHIPPED | OUTPATIENT
Start: 2023-10-05 | End: 2023-10-06

## 2023-10-05 RX ORDER — LISINOPRIL 10 MG/1
10 TABLET ORAL DAILY
Qty: 90 TABLET | Refills: 1 | Status: SHIPPED | OUTPATIENT
Start: 2023-10-05 | End: 2024-03-26

## 2023-10-05 NOTE — PROGRESS NOTES
"  Assessment & Plan   Problem List Items Addressed This Visit       Actinic keratosis     Left nare.  He believes long duration.  Discussed degeneration risk.  After discussion, cryotherapy applied.  Care discussed         Essential hypertension with goal blood pressure less than 140/90     Asymptomatic, overcontrolled.  Reduce ACE         Relevant Medications    lisinopril (ZESTRIL) 10 MG tablet    Hypothyroidism due to acquired atrophy of thyroid     Subclinical: Hypothyroid last measure.  Reassess         Relevant Orders    TSH with free T4 reflex (Completed)    T4 free (Completed)    Panlobular emphysema (H)     No resting symptoms, stable with exercise.  Continue current therapies         Type 2 diabetes mellitus with circulatory disorder, without long-term current use of insulin (H) - Primary     Assess anew         Relevant Orders    HEMOGLOBIN A1C (Completed)                 BMI:   Estimated body mass index is 29.52 kg/m  as calculated from the following:    Height as of this encounter: 1.626 m (5' 4\").    Weight as of this encounter: 78 kg (172 lb).           Rigo Goddard MD  [facility] JAYCE Bergeron is a 78 year old, presenting for the following health issues:  Recheck Medication        10/5/2023     3:09 PM   Additional Questions   Roomed by Geovanna REYNOSO       Medication Followup of Atorvastatin, Lisinopril, metoprolol, levothyronxine  Taking Medication as prescribed: yes  Side Effects:  None  Medication Helping Symptoms:  yes        Review of Systems   No dizziness palpitations falls      Objective    /60 (BP Location: Right arm, Patient Position: Sitting, Cuff Size: Adult Large)   Pulse 60   Temp 98.6  F (37  C) (Oral)   Resp 16   Ht 1.626 m (5' 4\")   Wt 78 kg (172 lb)   SpO2 94%   BMI 29.52 kg/m    Body mass index is 29.52 kg/m .  Physical Exam   RRR  Skin as described    Cryotherapy applied  -to [place] Care discussed          Rigo Goddard MD      "

## 2023-10-06 ENCOUNTER — TELEPHONE (OUTPATIENT)
Dept: FAMILY MEDICINE | Facility: CLINIC | Age: 78
End: 2023-10-06
Payer: COMMERCIAL

## 2023-10-06 DIAGNOSIS — I10 ESSENTIAL HYPERTENSION WITH GOAL BLOOD PRESSURE LESS THAN 140/90: ICD-10-CM

## 2023-10-06 PROBLEM — L57.0 ACTINIC KERATOSIS: Status: ACTIVE | Noted: 2023-10-06

## 2023-10-06 LAB
T4 FREE SERPL-MCNC: 1.16 NG/DL (ref 0.9–1.7)
TSH SERPL DL<=0.005 MIU/L-ACNC: 4.71 UIU/ML (ref 0.3–4.2)

## 2023-10-06 RX ORDER — METOPROLOL SUCCINATE 50 MG/1
50 TABLET, EXTENDED RELEASE ORAL DAILY
Qty: 90 TABLET | Refills: 3 | Status: SHIPPED | OUTPATIENT
Start: 2023-10-06 | End: 2024-09-30

## 2023-10-06 NOTE — TELEPHONE ENCOUNTER
Called patient.  He is missing metoprolol.  [facility] pharmacy told him they did not receive it.  Called pharmacy as shows they received yesterday.  Natasha states it is denied in computer.  Resent.  Monique Brown RN      Date/Time Action Taken User Additional Information   10/05/23 1537 Sign Rigo Goddard MD Reorder from Order:[id number]   10/05/23 1537 Taking Flag Checked Rigo Goddard MD [id number]     Outpatient Medication Detail     Disp Refills Start End SHANA   metoprolol succinate ER (TOPROL XL) 50 MG 24 hr tablet 90 tablet 3 10/5/2023  No   Sig - Route: Take 1 tablet (50 mg) by mouth daily - Oral   Sent to pharmacy as: Metoprolol Succinate ER 50 MG Oral Tablet Extended Release 24 Hour (TOPROL XL)   Class: E-Prescribe   Order: [id number]   E-Prescribing Status: Receipt confirmed by pharmacy (10/5/2023  3:37 PM CDT)     Pharmacy    [place], [place] - [address]

## 2023-10-06 NOTE — TELEPHONE ENCOUNTER
General Call      Reason for Call:  Pt requesting a call back from nurse regarding his medication. Please call pt at [phone] and advise.    What are your questions or concerns:  One medication was dropped and would like to know the reason why.    Date of last appointment with provider: 10/05/2023    Could we send this information to you in [employer] or would you prefer to receive a phone call?:   Patient would prefer a phone call   Okay to leave a detailed message?: Yes at Cell number on file:    Telephone Information:   Mobile [phone]

## 2023-10-07 NOTE — ASSESSMENT & PLAN NOTE
Left nare.  He believes long duration.  Discussed degeneration risk.  After discussion, cryotherapy applied.  Care discussed

## 2023-10-26 ENCOUNTER — HOSPITAL ENCOUNTER (OUTPATIENT)
Dept: CT IMAGING | Facility: CLINIC | Age: 78
Discharge: HOME | End: 2023-10-26
Attending: SURGERY | Admitting: SURGERY
Payer: COMMERCIAL

## 2023-10-26 DIAGNOSIS — Z95.828 HISTORY OF ENDOVASCULAR STENT GRAFT FOR ABDOMINAL AORTIC ANEURYSM (AAA): ICD-10-CM

## 2023-10-26 LAB
CREAT BLD-MCNC: 1.2 MG/DL (ref 0.7–1.3)
EGFRCR SERPLBLD CKD-EPI 2021: >60 ML/MIN/1.73M2

## 2023-10-26 PROCEDURE — 250N000009 HC RX 250: Performed by: SURGERY

## 2023-10-26 PROCEDURE — 82565 ASSAY OF CREATININE: CPT

## 2023-10-26 PROCEDURE — 250N000011 HC RX IP 250 OP 636: Performed by: SURGERY

## 2023-10-26 PROCEDURE — 74174 CTA ABD&PLVS W/CONTRAST: CPT

## 2023-10-26 RX ORDER — IOPAMIDOL 755 MG/ML
500 INJECTION, SOLUTION INTRAVASCULAR ONCE
Status: COMPLETED | OUTPATIENT
Start: 2023-10-26 | End: 2023-10-26

## 2023-10-26 RX ADMIN — IOPAMIDOL 72 ML: 755 INJECTION, SOLUTION INTRAVENOUS at 14:23

## 2023-10-26 RX ADMIN — SODIUM CHLORIDE 80 ML: 9 INJECTION, SOLUTION INTRAVENOUS at 14:23

## 2023-11-08 ENCOUNTER — OFFICE VISIT (OUTPATIENT)
Dept: OTHER | Facility: CLINIC | Age: 78
End: 2023-11-08
Attending: SURGERY
Payer: COMMERCIAL

## 2023-11-08 VITALS — SYSTOLIC BLOOD PRESSURE: 143 MMHG | HEART RATE: 70 BPM | OXYGEN SATURATION: 95 % | DIASTOLIC BLOOD PRESSURE: 79 MMHG

## 2023-11-08 DIAGNOSIS — Z95.828 HISTORY OF ENDOVASCULAR STENT GRAFT FOR ABDOMINAL AORTIC ANEURYSM (AAA): Primary | ICD-10-CM

## 2023-11-08 PROCEDURE — G0463 HOSPITAL OUTPT CLINIC VISIT: HCPCS | Performed by: SURGERY

## 2023-11-08 PROCEDURE — 99213 OFFICE O/P EST LOW 20 MIN: CPT | Performed by: SURGERY

## 2023-11-08 NOTE — PROGRESS NOTES
Patient is here to discuss follow up    BP (!) 143/79 (BP Location: Left arm, Patient Position: Chair, Cuff Size: Adult Regular)   Pulse 70   SpO2 95%     Questions patient would like addressed today are: N/A.    Refills are needed: No    Has homecare services and agency name:  T[facility] VO

## 2024-03-18 DIAGNOSIS — E03.4 HYPOTHYROIDISM DUE TO ACQUIRED ATROPHY OF THYROID: ICD-10-CM

## 2024-03-18 RX ORDER — LEVOTHYROXINE SODIUM 88 UG/1
88 TABLET ORAL DAILY
Qty: 90 TABLET | Refills: 0 | Status: SHIPPED | OUTPATIENT
Start: 2024-03-18 | End: 2024-03-26

## 2024-03-18 NOTE — TELEPHONE ENCOUNTER
I am labeled as PCP though I have not seen this patient since a preop in 2019.  His previous PCP is retiring.  Will refill this due to him having an appointment with me in approximately 1 week    Breezy Doe PA-C

## 2024-03-26 ENCOUNTER — OFFICE VISIT (OUTPATIENT)
Dept: FAMILY MEDICINE | Facility: CLINIC | Age: 79
End: 2024-03-26
Payer: COMMERCIAL

## 2024-03-26 VITALS
OXYGEN SATURATION: 98 % | HEIGHT: 64 IN | BODY MASS INDEX: 29.88 KG/M2 | HEART RATE: 61 BPM | SYSTOLIC BLOOD PRESSURE: 124 MMHG | TEMPERATURE: 98.1 F | RESPIRATION RATE: 14 BRPM | WEIGHT: 175 LBS | DIASTOLIC BLOOD PRESSURE: 74 MMHG

## 2024-03-26 DIAGNOSIS — N52.9 ERECTILE DYSFUNCTION, UNSPECIFIED ERECTILE DYSFUNCTION TYPE: ICD-10-CM

## 2024-03-26 DIAGNOSIS — R25.2 MUSCLE CRAMPS: ICD-10-CM

## 2024-03-26 DIAGNOSIS — E78.5 HYPERLIPIDEMIA LDL GOAL <100: ICD-10-CM

## 2024-03-26 DIAGNOSIS — I10 ESSENTIAL HYPERTENSION WITH GOAL BLOOD PRESSURE LESS THAN 140/90: ICD-10-CM

## 2024-03-26 DIAGNOSIS — E03.4 HYPOTHYROIDISM DUE TO ACQUIRED ATROPHY OF THYROID: ICD-10-CM

## 2024-03-26 DIAGNOSIS — Z00.00 ENCOUNTER FOR MEDICARE ANNUAL WELLNESS EXAM: Primary | ICD-10-CM

## 2024-03-26 DIAGNOSIS — F99 ABNORMAL MINI-MENTAL STATUS EXAM: ICD-10-CM

## 2024-03-26 DIAGNOSIS — J43.1 PANLOBULAR EMPHYSEMA (H): ICD-10-CM

## 2024-03-26 DIAGNOSIS — E11.59 TYPE 2 DIABETES MELLITUS WITH OTHER CIRCULATORY COMPLICATION, WITHOUT LONG-TERM CURRENT USE OF INSULIN (H): ICD-10-CM

## 2024-03-26 LAB — HBA1C MFR BLD: 7.6 % (ref 0–5.6)

## 2024-03-26 PROCEDURE — 80053 COMPREHEN METABOLIC PANEL: CPT | Performed by: PHYSICIAN ASSISTANT

## 2024-03-26 PROCEDURE — 83036 HEMOGLOBIN GLYCOSYLATED A1C: CPT | Performed by: PHYSICIAN ASSISTANT

## 2024-03-26 PROCEDURE — 36415 COLL VENOUS BLD VENIPUNCTURE: CPT | Performed by: PHYSICIAN ASSISTANT

## 2024-03-26 PROCEDURE — 82043 UR ALBUMIN QUANTITATIVE: CPT | Performed by: PHYSICIAN ASSISTANT

## 2024-03-26 PROCEDURE — 99214 OFFICE O/P EST MOD 30 MIN: CPT | Mod: 25 | Performed by: PHYSICIAN ASSISTANT

## 2024-03-26 PROCEDURE — 82570 ASSAY OF URINE CREATININE: CPT | Performed by: PHYSICIAN ASSISTANT

## 2024-03-26 PROCEDURE — 84443 ASSAY THYROID STIM HORMONE: CPT | Performed by: PHYSICIAN ASSISTANT

## 2024-03-26 PROCEDURE — G0438 PPPS, INITIAL VISIT: HCPCS | Performed by: PHYSICIAN ASSISTANT

## 2024-03-26 PROCEDURE — 84439 ASSAY OF FREE THYROXINE: CPT | Performed by: PHYSICIAN ASSISTANT

## 2024-03-26 PROCEDURE — 80061 LIPID PANEL: CPT | Performed by: PHYSICIAN ASSISTANT

## 2024-03-26 RX ORDER — LISINOPRIL 10 MG/1
10 TABLET ORAL DAILY
Qty: 90 TABLET | Refills: 1 | Status: SHIPPED | OUTPATIENT
Start: 2024-03-26 | End: 2024-09-19

## 2024-03-26 RX ORDER — ATORVASTATIN CALCIUM 40 MG/1
40 TABLET, FILM COATED ORAL DAILY
Qty: 90 TABLET | Refills: 3 | Status: SHIPPED | OUTPATIENT
Start: 2024-03-26

## 2024-03-26 RX ORDER — SILDENAFIL 50 MG/1
50 TABLET, FILM COATED ORAL DAILY PRN
Qty: 30 TABLET | Refills: 11 | Status: SHIPPED | OUTPATIENT
Start: 2024-03-26

## 2024-03-26 RX ORDER — LEVOTHYROXINE SODIUM 88 UG/1
88 TABLET ORAL DAILY
Qty: 90 TABLET | Refills: 3 | Status: SHIPPED | OUTPATIENT
Start: 2024-03-26 | End: 2024-05-17

## 2024-03-26 ASSESSMENT — PAIN SCALES - GENERAL: PAINLEVEL: NO PAIN (0)

## 2024-03-26 NOTE — PATIENT INSTRUCTIONS
Preventive Care Advice   This is general advice given by our system to help you stay healthy. However, your care team may have specific advice just for you. Please talk to your care team about your preventive care needs.  Nutrition  Eat 5 or more servings of fruits and vegetables each day.  Try wheat bread, brown rice and whole grain pasta (instead of white bread, rice, and pasta).  Get enough calcium and vitamin D. Check the label on foods and aim for 100% of the RDA (recommended daily allowance).  Lifestyle  Exercise at least 150 minutes each week   (30 minutes a day, 5 days a week).  Do muscle strengthening activities 2 days a week. These help control your weight and prevent disease.  No smoking.  Wear sunscreen to prevent skin cancer.  Have a dental exam and cleaning every 6 months.  Yearly exams  See your health care team every year to talk about:  Any changes in your health.  Any medicines your care team has prescribed.  Preventive care, family planning, and ways to prevent chronic diseases.  Shots (vaccines)   HPV shots (up to age 26), if you've never had them before.  Hepatitis B shots (up to age 59), if you've never had them before.  COVID-19 shot: Get this shot when it's due.  Flu shot: Get a flu shot every year.  Tetanus shot: Get a tetanus shot every 10 years.  Pneumococcal, hepatitis A, and RSV shots: Ask your care team if you need these based on your risk.  Shingles shot (for age 50 and up).  General health tests  Diabetes screening:  Starting at age 35, Get screened for diabetes at least every 3 years.  If you are younger than age 35, ask your care team if you should be screened for diabetes.  Cholesterol test: At age 39, start having a cholesterol test every 5 years, or more often if advised.  Bone density scan (DEXA): At age 50, ask your care team if you should have this scan for osteoporosis (brittle bones).  Hepatitis C: Get tested at least once in your life.  STIs (sexually transmitted  infections)  Before age 24: Ask your care team if you should be screened for STIs.  After age 24: Get screened for STIs if you're at risk. You are at risk for STIs (including HIV) if:  You are sexually active with more than one person.  You don't use condoms every time.  You or a partner was diagnosed with a sexually transmitted infection.  If you are at risk for HIV, ask about PrEP medicine to prevent HIV.  Get tested for HIV at least once in your life, whether you are at risk for HIV or not.  Cancer screening tests  Cervical cancer screening: If you have a cervix, begin getting regular cervical cancer screening tests at age 21. Most people who have regular screenings with normal results can stop after age 65. Talk about this with your provider.  Breast cancer scan (mammogram): If you've ever had breasts, begin having regular mammograms starting at age 40. This is a scan to check for breast cancer.  Colon cancer screening: It is important to start screening for colon cancer at age 45.  Have a colonoscopy test every 10 years (or more often if you're at risk) Or, ask your provider about stool tests like a FIT test every year or Cologuard test every 3 years.  To learn more about your testing options, visit: https://www.[email or web address]/505362.pdf.  For help making a decision, visit: https://bit.ly/eu96541.  Prostate cancer screening test: If you have a prostate and are age 55 to 69, ask your provider if you would benefit from a yearly prostate cancer screening test.  Lung cancer screening: If you are a current or former smoker age 50 to 80, ask your care team if ongoing lung cancer screenings are right for you.  For informational purposes only. Not to replace the advice of your health care provider. Copyright   2023 [employer]. All rights reserved. Clinically reviewed by the [facility]. [employer] 368162 - REV 01/24.    Preventing Falls: Care Instructions  Injuries and health  problems such as trouble walking or poor eyesight can increase your risk of falling. So can some medicines. But there are things you can do to help prevent falls. You can exercise to get stronger. You can also arrange your home to make it safer.    Talk to your doctor about the medicines you take. Ask if any of them increase the risk of falls and whether they can be changed or stopped.   Try to exercise regularly. It can help improve your strength and balance. This can help lower your risk of falling.     Practice fall safety and prevention.    Wear low-heeled shoes that fit well and give your feet good support. Talk to your doctor if you have foot problems that make this hard.  Carry a cellphone or wear a medical alert device that you can use to call for help.  Use stepladders instead of chairs to reach high objects. Don't climb if you're at risk for falls. Ask for help, if needed.  Wear the correct eyeglasses, if you need them.    Make your home safer.    Remove rugs, cords, clutter, and furniture from walkways.  Keep your house well lit. Use night-lights in hallways and bathrooms.  Install and use sturdy handrails on stairways.  Wear nonskid footwear, even inside. Don't walk barefoot or in socks without shoes.    Be safe outside.    Use handrails, curb cuts, and ramps whenever possible.  Keep your hands free by using a shoulder bag or backpack.  Try to walk in well-lit areas. Watch out for uneven ground, changes in pavement, and debris.  Be careful in the winter. Walk on the grass or gravel when sidewalks are slippery. Use de-icer on steps and walkways. Add non-slip devices to shoes.    Put grab bars and nonskid mats in your shower or tub and near the toilet. Try to use a shower chair or bath bench when bathing.   Get into a tub or shower by putting in your weaker leg first. Get out with your strong side first. Have a phone or medical alert device in the bathroom with you.   Where can you learn more?  Go to  "https://www.[employer].net/patiented  Enter G117 in the search box to learn more about \"Preventing Falls: Care Instructions.\"  Current as of: July 17, 2023               Content Version: 14.0    1650-3754 [employer]wise, [employer] by [employer].   Care instructions adapted under license by your healthcare professional. If you have questions about a medical condition or this instruction, always ask your healthcare professional. [employer]wise, [employer] disclaims any warranty or liability for your use of this information.      "

## 2024-03-26 NOTE — PROGRESS NOTES
Assessment & Plan     Encounter for Medicare annual wellness exam  Stable wellness other than below discussions on chronic conditions.  Also discussed Mini-Mental exam being abnormal.  Patient admits to ongoing short-term memory issues which have been present for years.  Recommending formal neuropsych evaluation for at least baseline if not formal cognitive impairment evaluation.  Patient denies this for now.    Of note patient is here to establish care with myself.  However informed him I am moving to the [place] location and patient states he would prefer to establish with a provider closer to his home.  Will need to follow-up to establish care with a new provider at his follow-up appointment    Panlobular emphysema (H)  Ongoing.  Worsening mucus production.  On Advair.  Likely would benefit from LAMA addition.  Will attempt trilogy to limit number of medications needed.  If too costly or concerns, consider Incruse/Spiriva addition to his current Advair.  Recheck in 6 months.  Sooner if concerns.  - Fluticasone-Umeclidin-Vilant (TRELEGY ELLIPTA) 100-62.5-25 MCG/ACT oral inhaler; Inhale 1 puff into the lungs daily  - OFFICE/OUTPT VISIT,EST,LEVL IV  Medication use and side effects discussed with the patient. Patient is in complete understanding and agreement with plan.     Essential hypertension with goal blood pressure less than 140/90  Stable on current regimen.  Also followed by vascular surgery.  Will continue on current regimen and labs pending.  If within normal limits, to recheck in 6 months.  - lisinopril (ZESTRIL) 10 MG tablet; Take 1 tablet (10 mg) by mouth daily  - Comprehensive metabolic panel (BMP + Alb, Alk Phos, ALT, AST, Total. Bili, TP); Future  - Comprehensive metabolic panel (BMP + Alb, Alk Phos, ALT, AST, Total. Bili, TP)  - OFFICE/OUTPT VISIT,EST,LEVL IV  Medication use and side effects discussed with the patient. Patient is in complete understanding and agreement with plan.      Hypothyroidism due to acquired atrophy of thyroid  Stable on current regimen.  No side effects of Synthroid.  Will recheck labs and if within normal limits to recheck annually.  - levothyroxine (SYNTHROID/LEVOTHROID) 88 MCG tablet; Take 1 tablet (88 mcg) by mouth daily  - TSH with free T4 reflex; Future  - TSH with free T4 reflex  - OFFICE/OUTPT VISIT,EST,LEVL IV  Medication use and side effects discussed with the patient. Patient is in complete understanding and agreement with plan.     Hyperlipidemia LDL goal <100  Stable on current regimen.  LDL is at goal under 70.  Will maintain current regimen.  Recommending increasing water intake due to cramps.  However if this does not improve symptoms, consider nighttime vitamin B complex and if this still does not aid consider switching Lipitor to Crestor.  - atorvastatin (LIPITOR) 40 MG tablet; Take 1 tablet (40 mg) by mouth daily  - Lipid panel reflex to direct LDL Fasting; Future  - Comprehensive metabolic panel (BMP + Alb, Alk Phos, ALT, AST, Total. Bili, TP); Future  - Lipid panel reflex to direct LDL Fasting  - Comprehensive metabolic panel (BMP + Alb, Alk Phos, ALT, AST, Total. Bili, TP)  - OFFICE/OUTPT VISIT,EST,LEVL IV  Medication use and side effects discussed with the patient. Patient is in complete understanding and agreement with plan.     Erectile dysfunction, unspecified erectile dysfunction type  Discussed about possible etiologies including but not limited to his diabetes, weight, hyperlipidemia, hypertension, coronary artery disease.  Continued better management of all of these can aid in his symptoms as well.  Particularly diet and exercise.  However for his symptoms, will prescribe 50 mg sildenafil to take 30 minutes to 4 hours prior to intercourse.  Follow-up as needed  Medication use and side effects discussed with the patient. Patient is in complete understanding and agreement with plan.     - OFFICE/OUTPT VISIT,EST,LEVL IV    Type 2 diabetes  "mellitus with other circulatory complication, without long-term current use of insulin (H)  As above.  A1c today is slightly worse than last check.  But overall stable over the last year.  Discussed importance of diet and exercise modifications.  Especially with this patient's diet would be crucial to improve.  Will recheck in 6 months with his diabetes recheck and new PCP.  No medication needed at this time.  - Lipid panel reflex to direct LDL Fasting; Future  - Comprehensive metabolic panel (BMP + Alb, Alk Phos, ALT, AST, Total. Bili, TP); Future  - Hemoglobin A1c; Future  - Albumin Random Urine Quantitative with Creat Ratio; Future  - Lipid panel reflex to direct LDL Fasting  - Comprehensive metabolic panel (BMP + Alb, Alk Phos, ALT, AST, Total. Bili, TP)  - Hemoglobin A1c  - Albumin Random Urine Quantitative with Creat Ratio  - OFFICE/OUTPT VISIT,EST,LEVL IV    Abnormal mini-mental status exam  As above  - OFFICE/OUTPT VISIT,EST,LEVL IV    Muscle cramps  As above      BMI  Estimated body mass index is 30.04 kg/m  as calculated from the following:    Height as of this encounter: 1.626 m (5' 4\").    Weight as of this encounter: 79.4 kg (175 lb).   Weight management plan: Discussed healthy diet and exercise guidelines          Loco Bergeron is a 78 year old, presenting for the following health issues:  Recheck Medication        3/26/2024    12:46 PM   Additional Questions   Roomed by Nicolle Del Toro     History of Present Illness       Hyperlipidemia:  He presents for follow up of hyperlipidemia.   He is taking medication to lower cholesterol. He is not having myalgia or other side effects to statin medications.    Hypertension: He presents for follow up of hypertension.  He does not check blood pressure  regularly outside of the clinic. Outpatient blood pressures have not been over 140/90. He does not follow a low salt diet.     He eats 0-1 servings of fruits and vegetables daily.He consumes 1 sweetened " beverage(s) daily.He exercises with enough effort to increase his heart rate 60 or more minutes per day.  He exercises with enough effort to increase his heart rate 7 days per week.   He is taking medications regularly.     In summary patient is a 78-year-old male with a past history of COPD, hypertension, hypothyroidism, hyperlipidemia, type 2 diabetes, coronary artery disease, as well as history of AAA followed by vascular surgery.  He presents today to [facility] care as well as his Medicare wellness visit to go over his medications.    In regards to his COPD he feels like he is coughing up more mucus than he typically does at his baseline.  No worsening shortness of breath or chest pains.  He does feel his Advair has helped overall but the mucus production has been more of a concern.    In regards to his diabetes, he does not monitor his blood sugars.  His wife who is with him today states he does not manage his diet well and eats many sugary foods and beverages.  Patient denies any numbness tingling in the feet or unintentional weight loss, vision changes, urination changes.    In regards to his hypothyroidism, patient denies any symptoms of hyper or hypothyroidism.  Taking medicines as prescribed without side effects.    In regards to his hypertension, denies any side effects of his current medication regimen.  Not monitoring his blood pressure at home.    In regards to his coronary artery disease.  Patient is on a statin as well as baby aspirin.  No side effects known.  Though patient does state at night he does often get cramps in his hamstrings.  This is improved with massage gun.  His most recent LDL was at goal at goal of 60.  Patient also states he only drinks about 8 ounces of water daily.    Patient also states for years has had issues with obtaining and maintaining an erection.  No decreased libido.  Has used Viagra in the past but stopped using due to cost.  No side effects in the past and would like  to reattempt this.  Annual Wellness Visit     Patient has been advised of split billing requirements and indicates understanding: Yes          Health Care Directive  Patient does not have a Health Care Directive or Living Will: Discussed advance care planning with patient; however, patient declined at this time.  In general, how would you rate your overall physical health? good  Do you have a special diet?  Regular (no restrictions)        10/10/2019   Exercise, Social Connection, Stress   Days per week of moderate/strenous exercise 2 days   Average minutes spent exercising at this level 20 min   Frequency of gathering with friends or relatives Twice   Feel stress (tense, anxious, or unable to sleep) Only a littl     Do you see a dentist two times every year?  (!) NO  The patient was instructed to see the dentist every 6 months.   Have you been more tired than usual lately?  No  If you drink alcohol do you typically have >3 drinks per day or >7 drinks per week? No  Do you have a current opioid prescription? No  Do you use any other controlled substances or medications that are not prescribed by a provider? None  Social History     Tobacco Use    Smoking status: Former     Packs/day: 0.25     Years: 40.00     Additional pack years: 0.00     Total pack years: 10.00     Types: Cigarettes    Smokeless tobacco: Never    Tobacco comments:     last one was 10/13 (trying to quit)   Vaping Use    Vaping Use: Never used   Substance Use Topics    Alcohol use: No    Drug use: No       Needs assistance for the following daily activities: no assistance needed  Which of the following safety concerns are present in your home?  none identified   Do you (or your family members) have any concerns about your safety while driving?  No  Do you have any of the following hearing concerns?: No hearing concerns  In the past 6 months, have you been bothered by leaking of urine? No            Today's PHQ-2 Score:       3/26/2024    12:54 PM    PHQ-2 ( 1999 [employer])   Q1: Little interest or pleasure in doing things 0   Q2: Feeling down, depressed or hopeless 0   PHQ-2 Score 0   Q1: Little interest or pleasure in doing things Not at all   Q2: Feeling down, depressed or hopeless Not at all   PHQ-2 Score 0       ASCVD Risk   The 10-year ASCVD risk score (Holly BINGHAM, et al., 2019) is: 53.6%    Values used to calculate the score:      Age: 78 years      Sex: Male      Is Non- : No      Diabetic: Yes      Tobacco smoker: No      Systolic Blood Pressure: 124 mmHg      Is BP treated: Yes      HDL Cholesterol: 41 mg/dL      Total Cholesterol: 133 mg/dL            Reviewed and updated as needed this visit by Provider   Tobacco  Allergies  Meds  Problems  Med Hx  Surg Hx  Fam Hx            Past Medical History:   Diagnosis Date    Abdominal aortic aneurysm (AAA) without rupture (H24) 10/13/2016    CAD (coronary artery disease)     Coronary artery disease involving native coronary artery of native heart without angina pectoris 10/14/2019    Per Note. Dr. López OV 2-20-13 - to establish care. He has a history of myocardial infarction back in 1998 @ [facility] . Does not remember any details- and no stent.   Nuclear 3-12-13 LVEF 34% to establish care. He has a history of myocardial infarction back in 1998, small to moderately sized partially reversible distal anterior anteroseptal and apical defect, small to moderately sized inferio    COVID-19 virus vaccination declined 2/25/2021    Diabetes mellitus type 2, uncomplicated (H) 9/22/2016    Diet     WIN (dyspnea on exertion) 11/21/2014    Heart attack (H) 1998    History of myocardial infarction in adulthood 9/22/2016    History of tobacco abuse 9/10/2019    HTN, goal below 140/90 1/28/2014    Hyperlipidemia LDL goal <100 10/31/2010    Hypothyroidism 11/9/2011    Hypothyroidism due to acquired atrophy of thyroid 2/18/2016    Ischemic cardiomyopathy     LBP (low back pain)  11/21/2014    Lumbar radiculopathy 9/10/2019    Microalbuminuria 11/7/2016    X1    Midline low back pain with right-sided sciatica 2/25/2021    Morbid obesity (H) 2/25/2021    Muscle spasm 9/25/2017    Other abnormal glucose 5/8/2014    Panlobular emphysema (H) 9/22/2016    Presbycusis syndrome, bilateral 9/22/2016    Right-sided low back pain without sciatica, unspecified chronicity 3/16/2017    S/P lumbar spinal arthrodesis 9/10/2019    Tobacco use disorder 7/19/2006    Tubular adenoma     Type 2 diabetes mellitus with circulatory disorder, without long-term current use of insulin (H) 9/22/2016    Diet     Voice disorder 4/4/2023     Past Surgical History:   Procedure Laterality Date    ANGIOPLASTY  1998    COLONOSCOPY N/A 11/17/2016    Procedure: COLONOSCOPY;  Surgeon: Adam Lantigua MD;  Location: [facility]    ENDOVASCULAR REPAIR ANEURYSM ABDOMINAL AORTA N/A 7/5/2022    Procedure: BILATERAL FEMORAL ARTERY EXPOSURE, BILATERAL FEMORAL ARTERY REPAIR, CUTDOWN APPROACH ENDOVASCULAR ABDOMINAL AORTA ANEURYSM REPAIR WITH MEDTRONIC STENT GRAFTS;  Surgeon: Ayush Mays MD;  Location: [facility] OR    IR ABDOMINAL ENDOVASCULAR STENT GRAFT  7/5/2022    OPTICAL TRACKING SYSTEM FUSION SPINE POSTERIOR LUMBAR TWO LEVELS N/A 10/28/2019    Procedure: L5 Herrera-Talavera osteotomy and L4-5 bilateral decompressive laminectomy with L5-S1 transforaminal lumbar interbody fusion and  L4-S1 posterior instrumented fusion;  Surgeon: Phillip Beavers MD;  Location: [facility] OR     BP Readings from Last 3 Encounters:   03/26/24 124/74   11/08/23 (!) 143/79   10/05/23 100/60    Wt Readings from Last 3 Encounters:   03/26/24 79.4 kg (175 lb)   10/05/23 78 kg (172 lb)   04/04/23 78.5 kg (173 lb)                  Patient Active Problem List   Diagnosis    HYPERLIPIDEMIA LDL GOAL <100    Advanced directives, counseling/discussion    Essential hypertension with goal blood pressure less than 140/90    WIN (dyspnea on exertion)    Hypothyroidism  due to acquired atrophy of thyroid    Panlobular emphysema (H)    History of myocardial infarction in adulthood    Presbycusis syndrome, bilateral    Type 2 diabetes mellitus with circulatory disorder, without long-term current use of insulin (H)    History of abdominal aortic aneurysm (AAA)    Microalbuminuria    Muscle spasm    Gait disturbance    S/P lumbar spinal arthrodesis    Personal history of tobacco use    DDD (degenerative disc disease), lumbar    Coronary artery disease involving native coronary artery of native heart without angina pectoris    S/P cervical spinal fusion    Midline low back pain with right-sided sciatica    Cough    Need for prophylactic antibiotic    Voice disorder    Cellulitis and abscess of leg    Actinic keratosis     Past Surgical History:   Procedure Laterality Date    ANGIOPLASTY  1998    COLONOSCOPY N/A 11/17/2016    Procedure: COLONOSCOPY;  Surgeon: Adam Lantigua MD;  Location:  GI    ENDOVASCULAR REPAIR ANEURYSM ABDOMINAL AORTA N/A 7/5/2022    Procedure: BILATERAL FEMORAL ARTERY EXPOSURE, BILATERAL FEMORAL ARTERY REPAIR, CUTDOWN APPROACH ENDOVASCULAR ABDOMINAL AORTA ANEURYSM REPAIR WITH MEDTRONIC STENT GRAFTS;  Surgeon: yAush Mays MD;  Location: [facility] OR    IR ABDOMINAL ENDOVASCULAR STENT GRAFT  7/5/2022    OPTICAL TRACKING SYSTEM FUSION SPINE POSTERIOR LUMBAR TWO LEVELS N/A 10/28/2019    Procedure: L5 Herrera-Talavera osteotomy and L4-5 bilateral decompressive laminectomy with L5-S1 transforaminal lumbar interbody fusion and  L4-S1 posterior instrumented fusion;  Surgeon: Phillip Beavers MD;  Location:  OR       Social History     Tobacco Use    Smoking status: Former     Packs/day: 0.25     Years: 40.00     Additional pack years: 0.00     Total pack years: 10.00     Types: Cigarettes    Smokeless tobacco: Never    Tobacco comments:     last one was 10/13 (trying to quit)   Substance Use Topics    Alcohol use: No     Family History   Problem Relation  Age of Onset    Cardiovascular Father         heart attack    Coronary Artery Disease Father     Cerebrovascular Disease Father     Cardiovascular Brother         heart attack    Cardiovascular Sister         2 sisters - minor cardiac problems- pt unsure of the exact nature         Current Outpatient Medications   Medication Sig Dispense Refill    aspirin (ASA) 81 MG EC tablet Take 1 tablet (81 mg) by mouth daily 100 tablet 3    atorvastatin (LIPITOR) 40 MG tablet Take 1 tablet (40 mg) by mouth daily 90 tablet 3    Fluticasone-Umeclidin-Vilant (TRELEGY ELLIPTA) 100-62.5-25 MCG/ACT oral inhaler Inhale 1 puff into the lungs daily 180 each 1    levothyroxine (SYNTHROID/LEVOTHROID) 88 MCG tablet Take 1 tablet (88 mcg) by mouth daily 90 tablet 3    lisinopril (ZESTRIL) 10 MG tablet Take 1 tablet (10 mg) by mouth daily 90 tablet 1    metoprolol succinate ER (TOPROL XL) 50 MG 24 hr tablet Take 1 tablet (50 mg) by mouth daily 90 tablet 3    Multiple Vitamins-Minerals (PRESERVISION AREDS PO) Take by mouth 2 times daily Pt take one tab in the am and one at hs      sildenafil (VIAGRA) 50 MG tablet Take 1 tablet (50 mg) by mouth daily as needed (ED) 30 minutes to 4 hours prior to intercourse 30 tablet 11     Allergies   Allergen Reactions    Chantix [Varenicline] GI Disturbance     Recent Labs   Lab Test 03/26/24  1419 10/26/23  1421 10/05/23  1550 04/04/23  1533 09/14/22  1456 07/05/22  1630 06/30/22  1453 03/16/22  1547 02/25/21  0936 08/14/20  0909 10/25/19  1238   A1C 7.6*  --  7.2* 7.6*   < >  --    < > 7.2*   < > 7.2*  --    LDL  --   --   --  60  --   --   --  58  --  67  --    HDL  --   --   --  41  --   --   --  39*  --  33*  --    TRIG  --   --   --  159*  --   --   --  168*  --  194*  --    ALT  --   --   --  17  --   --   --  23  --  17  --    CR  --  1.2  --  1.11   < > 1.20   < > 1.16  --  1.12 1.15   GFRESTIMATED  --  >60  --  68   < > 62   < > 65  --  63 62   GFRESTBLACK  --   --   --   --   --   --   --   --    --  73 71   POTASSIUM  --   --   --  5.1  --  4.9   < > 4.9  --  5.2 4.6   TSH  --   --  4.71* 6.24*  --   --   --  4.37*   < > 8.46*  --     < > = values in this interval not displayed.        Current providers sharing in care for this patient include:  Patient Care Team:  Jose Doe PA-C as PCP - General (Family Medicine)  Rigo Goddard MD as Assigned PCP  Ayush Mays MD as Assigned Heart and Vascular Provider    The following health maintenance items are reviewed in Epic and correct as of today:  Health Maintenance   Topic Date Due    EYE EXAM  03/15/2024    BMP  04/04/2024    CMP  04/04/2024    LIPID  04/04/2024    MICROALBUMIN  04/04/2024    DIABETIC FOOT EXAM  04/04/2024    A1C  09/26/2024    TSH W/FREE T4 REFLEX  10/05/2024    ANNUAL REVIEW OF HM ORDERS  10/05/2024    MEDICARE ANNUAL WELLNESS VISIT  03/26/2025    FALL RISK ASSESSMENT  03/26/2025    DTAP/TDAP/TD IMMUNIZATION (2 - Td or Tdap) 02/18/2026    ADVANCE CARE PLANNING  03/16/2027    SPIROMETRY  Completed    HEPATITIS C SCREENING  Completed    COPD ACTION PLAN  Completed    PHQ-2 (once per calendar year)  Completed    INFLUENZA VACCINE  Completed    Pneumococcal Vaccine: 65+ Years  Completed    ZOSTER IMMUNIZATION  Completed    RSV VACCINE (Pregnancy & 60+)  Completed    COVID-19 Vaccine  Completed    IPV IMMUNIZATION  Aged Out    HPV IMMUNIZATION  Aged Out    MENINGITIS IMMUNIZATION  Aged Out    RSV MONOCLONAL ANTIBODY  Aged Out    COLORECTAL CANCER SCREENING  Discontinued    LUNG CANCER SCREENING  Discontinued       Appropriate preventive services were discussed with this patient, including applicable screening as appropriate for fall prevention, nutrition, physical activity, Tobacco-use cessation, weight loss and cognition.  Checklist reviewing preventive services available has been given to the patient.          3/26/2024   Mini Cog   Clock Draw Score 2 Normal   3 Item Recall 1 object recalled    0 objects recalled   Mini Cog  "Total Score 2                    Objective    /74   Pulse 61   Temp 98.1  F (36.7  C) (Oral)   Resp 14   Ht 1.626 m (5' 4\")   Wt 79.4 kg (175 lb)   SpO2 98%   BMI 30.04 kg/m    Body mass index is 30.04 kg/m .  Physical Exam   GENERAL: alert, no distress, and obese  RESP: no rales , no rhonchi, no wheezes, and prolonged expiratory phase  CV: regular rates and rhythm, normal S1 S2, no S3 or S4, and no murmur, click or rub  PSYCH: mentation appears normal, affect normal/bright    Results for orders placed or performed in visit on 03/26/24 (from the past 24 hour(s))   Hemoglobin A1c   Result Value Ref Range    Hemoglobin A1C 7.6 (H) 0.0 - 5.6 %           Signed Electronically by: Jose Doe PA-C    "

## 2024-03-27 LAB
ALBUMIN SERPL BCG-MCNC: 4.3 G/DL (ref 3.5–5.2)
ALP SERPL-CCNC: 77 U/L (ref 40–150)
ALT SERPL W P-5'-P-CCNC: 16 U/L (ref 0–70)
ANION GAP SERPL CALCULATED.3IONS-SCNC: 10 MMOL/L (ref 7–15)
AST SERPL W P-5'-P-CCNC: 26 U/L (ref 0–45)
BILIRUB SERPL-MCNC: 0.3 MG/DL
BUN SERPL-MCNC: 26.9 MG/DL (ref 8–23)
CALCIUM SERPL-MCNC: 9.3 MG/DL (ref 8.8–10.2)
CHLORIDE SERPL-SCNC: 105 MMOL/L (ref 98–107)
CHOLEST SERPL-MCNC: 117 MG/DL
CREAT SERPL-MCNC: 1.16 MG/DL (ref 0.67–1.17)
CREAT UR-MCNC: 67.4 MG/DL
DEPRECATED HCO3 PLAS-SCNC: 25 MMOL/L (ref 22–29)
EGFRCR SERPLBLD CKD-EPI 2021: 64 ML/MIN/1.73M2
FASTING STATUS PATIENT QL REPORTED: ABNORMAL
GLUCOSE SERPL-MCNC: 126 MG/DL (ref 70–99)
HDLC SERPL-MCNC: 37 MG/DL
LDLC SERPL CALC-MCNC: 50 MG/DL
MICROALBUMIN UR-MCNC: 39.7 MG/L
MICROALBUMIN/CREAT UR: 58.9 MG/G CR (ref 0–17)
NONHDLC SERPL-MCNC: 80 MG/DL
POTASSIUM SERPL-SCNC: 5.1 MMOL/L (ref 3.4–5.3)
PROT SERPL-MCNC: 6.7 G/DL (ref 6.4–8.3)
SODIUM SERPL-SCNC: 140 MMOL/L (ref 135–145)
T4 FREE SERPL-MCNC: 1.23 NG/DL (ref 0.9–1.7)
TRIGL SERPL-MCNC: 151 MG/DL
TSH SERPL DL<=0.005 MIU/L-ACNC: 6.79 UIU/ML (ref 0.3–4.2)

## 2024-05-16 ENCOUNTER — OFFICE VISIT (OUTPATIENT)
Dept: FAMILY MEDICINE | Facility: CLINIC | Age: 79
End: 2024-05-16
Payer: COMMERCIAL

## 2024-05-16 VITALS
OXYGEN SATURATION: 96 % | RESPIRATION RATE: 10 BRPM | WEIGHT: 179.2 LBS | HEIGHT: 66 IN | DIASTOLIC BLOOD PRESSURE: 79 MMHG | HEART RATE: 57 BPM | BODY MASS INDEX: 28.8 KG/M2 | TEMPERATURE: 97.3 F | SYSTOLIC BLOOD PRESSURE: 137 MMHG

## 2024-05-16 DIAGNOSIS — E11.59 TYPE 2 DIABETES MELLITUS WITH OTHER CIRCULATORY COMPLICATION, WITHOUT LONG-TERM CURRENT USE OF INSULIN (H): Primary | ICD-10-CM

## 2024-05-16 DIAGNOSIS — I10 ESSENTIAL HYPERTENSION WITH GOAL BLOOD PRESSURE LESS THAN 140/90: ICD-10-CM

## 2024-05-16 DIAGNOSIS — Z86.79 HISTORY OF ABDOMINAL AORTIC ANEURYSM (AAA): ICD-10-CM

## 2024-05-16 DIAGNOSIS — J43.1 PANLOBULAR EMPHYSEMA (H): ICD-10-CM

## 2024-05-16 DIAGNOSIS — M51.369 DDD (DEGENERATIVE DISC DISEASE), LUMBAR: ICD-10-CM

## 2024-05-16 DIAGNOSIS — E06.3 HYPOTHYROIDISM DUE TO HASHIMOTO'S THYROIDITIS: ICD-10-CM

## 2024-05-16 DIAGNOSIS — I25.10 CORONARY ARTERY DISEASE INVOLVING NATIVE CORONARY ARTERY OF NATIVE HEART WITHOUT ANGINA PECTORIS: ICD-10-CM

## 2024-05-16 DIAGNOSIS — Z79.2 NEED FOR PROPHYLACTIC ANTIBIOTIC: ICD-10-CM

## 2024-05-16 DIAGNOSIS — L57.0 ACTINIC KERATOSIS: ICD-10-CM

## 2024-05-16 DIAGNOSIS — B35.3 TINEA PEDIS OF BOTH FEET: ICD-10-CM

## 2024-05-16 PROBLEM — R05.9 COUGH: Status: RESOLVED | Noted: 2022-03-16 | Resolved: 2024-05-16

## 2024-05-16 PROBLEM — L03.119 CELLULITIS AND ABSCESS OF LEG: Status: RESOLVED | Noted: 2023-04-05 | Resolved: 2024-05-16

## 2024-05-16 PROBLEM — L02.419 CELLULITIS AND ABSCESS OF LEG: Status: RESOLVED | Noted: 2023-04-05 | Resolved: 2024-05-16

## 2024-05-16 PROBLEM — M62.838 MUSCLE SPASM: Status: RESOLVED | Noted: 2017-09-25 | Resolved: 2024-05-16

## 2024-05-16 PROCEDURE — 84439 ASSAY OF FREE THYROXINE: CPT | Performed by: FAMILY MEDICINE

## 2024-05-16 PROCEDURE — 36415 COLL VENOUS BLD VENIPUNCTURE: CPT | Performed by: FAMILY MEDICINE

## 2024-05-16 PROCEDURE — 99214 OFFICE O/P EST MOD 30 MIN: CPT | Performed by: FAMILY MEDICINE

## 2024-05-16 PROCEDURE — 84443 ASSAY THYROID STIM HORMONE: CPT | Performed by: FAMILY MEDICINE

## 2024-05-16 PROCEDURE — 99207 PR FOOT EXAM NO CHARGE: CPT | Performed by: FAMILY MEDICINE

## 2024-05-16 NOTE — ASSESSMENT & PLAN NOTE
No resting symptoms, stable with exercise.  Continue on trelegy daily, according to the patient he said it works very well.

## 2024-05-16 NOTE — ASSESSMENT & PLAN NOTE
Hx of MI 1998, s/p angiogram but no stent put in,   Myocardial perfusion imaging 2019  demonstrated a fixed apical/distal anteroseptal defect consistent with  a small nontransmural infarct. No ischemia, and Ef was 54%.

## 2024-05-16 NOTE — PROGRESS NOTES
Assessment & Plan   Problem List Items Addressed This Visit       Essential hypertension with goal blood pressure less than 140/90     Controlled on Lisinopril and Metoprolol 50 mg          Hypothyroidism due to Hashimoto's thyroiditis     TSH was slightly low, will recheck TSH today and adjust Levothyroxine  accordingly.         Relevant Orders    TSH with free T4 reflex    Panlobular emphysema (H)     No resting symptoms, stable with exercise.  Continue on trelegy daily, according to the patient he said it works very well.         Type 2 diabetes mellitus with circulatory disorder, without long-term current use of insulin (H) - Primary     Diabetes is well controlled on diet only.          Relevant Orders    FOOT EXAM (Completed)    History of abdominal aortic aneurysm (AAA)     status post EVAR of a 5.5 cm AAA utilizing a Medtronic 25 mm bifurcated device on 7/5/2022   Refer back to Dr. Mays.         Relevant Orders    Vascular Medicine Referral    DDD (degenerative disc disease), lumbar     Complain of chronic pain in the lower back, he exercises regularly about 2 hours a day at the gym.  S/p back surgery in 2022.         Coronary artery disease involving native coronary artery of native heart without angina pectoris     Hx of MI 1998, s/p angiogram but no stent put in,   Myocardial perfusion imaging 2019  demonstrated a fixed apical/distal anteroseptal defect consistent with  a small nontransmural infarct. No ischemia, and Ef was 54%.           Need for prophylactic antibiotic     Due to EVAR, per surgery, recommend abx prophylaxis before dental procedure.         Actinic keratosis     Along with multiple lesion on scalp, will refer to dermatology for skin check.         Relevant Orders    Adult Dermatology  Referral                   Loco Bergeron is a 79 year old, presenting for the following health issues:  Establish Care, Diabetes, and Mole (Xyears, behind L ear, mole like, no pain present,  "Rosetta seems to help, pts wifes concerned)        5/16/2024     9:45 AM   Additional Questions   Roomed by AT     History of Present Illness       Diabetes:   He presents for follow up of diabetes.    He is not checking blood glucose.         He has no concerns regarding his diabetes at this time.  He is having numbness in feet and redness, sores, or blisters on feet.  The patient has not had a diabetic eye exam in the last 12 months.          Reason for visit:  Diabetes f/u  Symptom onset:  Today  Symptom intensity:  Moderate  Symptom progression:  Staying the same    He eats 0-1 servings of fruits and vegetables daily.He consumes 0 sweetened beverage(s) daily.He exercises with enough effort to increase his heart rate 9 or less minutes per day.  He exercises with enough effort to increase his heart rate 3 or less days per week.   He is taking medications regularly.         Diabetes Follow-up    How often are you checking your blood sugar? Not at all  What concerns do you have today about your diabetes? None   Do you have any of these symptoms? (Select all that apply)  Numbness in feet and Redness, sores, or blisters on feet  Have you had a diabetic eye exam in the last 12 months? No        BP Readings from Last 2 Encounters:   05/16/24 137/79   03/26/24 124/74     Hemoglobin A1C (%)   Date Value   03/26/2024 7.6 (H)   10/05/2023 7.2 (H)   02/25/2021 7.6 (H)   08/14/2020 7.2 (H)     LDL Cholesterol Calculated (mg/dL)   Date Value   03/26/2024 50   04/04/2023 60   08/14/2020 67   09/10/2019 68                   Objective    /79 (BP Location: Left arm, Patient Position: Chair, Cuff Size: Adult Regular)   Pulse 57   Temp 97.3  F (36.3  C) (Oral)   Resp 10   Ht 1.676 m (5' 6\")   Wt 81.3 kg (179 lb 3.2 oz)   SpO2 96%   BMI 28.92 kg/m    Body mass index is 28.92 kg/m .  Physical Exam   GENERAL: alert and no distress  NECK: no adenopathy, no asymmetry, masses, or scars  RESP: lungs clear to auscultation - no " rales, rhonchi or wheezes  CV: regular rate and rhythm, normal S1 S2, no S3 or S4, no murmur, click or rub, no peripheral edema  ABDOMEN: soft, nontender, no hepatosplenomegaly, no masses and bowel sounds normal  SKIN: multiple skin lesions on the face and neck, some concerning for Actinic keratosis.            Signed Electronically by: Nafisa Workman MD

## 2024-05-16 NOTE — ASSESSMENT & PLAN NOTE
Complain of chronic pain in the lower back, he exercises regularly about 2 hours a day at the gym.  S/p back surgery in 2022.

## 2024-05-16 NOTE — ASSESSMENT & PLAN NOTE
status post EVAR of a 5.5 cm AAA utilizing a Medtronic 25 mm bifurcated device on 7/5/2022   Refer back to Dr. Mays.

## 2024-05-17 ENCOUNTER — TELEPHONE (OUTPATIENT)
Dept: FAMILY MEDICINE | Facility: CLINIC | Age: 79
End: 2024-05-17
Payer: COMMERCIAL

## 2024-05-17 DIAGNOSIS — E03.4 HYPOTHYROIDISM DUE TO ACQUIRED ATROPHY OF THYROID: ICD-10-CM

## 2024-05-17 LAB
T4 FREE SERPL-MCNC: 1.31 NG/DL (ref 0.9–1.7)
TSH SERPL DL<=0.005 MIU/L-ACNC: 6.74 UIU/ML (ref 0.3–4.2)

## 2024-05-17 RX ORDER — CLOTRIMAZOLE 1 %
CREAM (GRAM) TOPICAL 2 TIMES DAILY
Qty: 60 G | Refills: 1 | Status: SHIPPED | OUTPATIENT
Start: 2024-05-17 | End: 2024-06-07

## 2024-05-17 RX ORDER — LEVOTHYROXINE SODIUM 100 UG/1
100 TABLET ORAL DAILY
Qty: 90 TABLET | Refills: 3 | Status: SHIPPED | OUTPATIENT
Start: 2024-05-17

## 2024-05-17 NOTE — TELEPHONE ENCOUNTER
Patient reported you talked about a cream for his foot callouses yesterday at his office visit.  Do you remember what kind of cream he needs?    Thanks,  Rhonda Archer CPhT  [facility]  [phone]

## 2024-05-17 NOTE — TELEPHONE ENCOUNTER
Thyroid is low, I recommend increasing the dose of Levothyroxine medication to 100 Mcg, repeat the labs in 2 months please.

## 2024-09-17 DIAGNOSIS — I10 ESSENTIAL HYPERTENSION WITH GOAL BLOOD PRESSURE LESS THAN 140/90: ICD-10-CM

## 2024-09-18 DIAGNOSIS — J43.1 PANLOBULAR EMPHYSEMA (H): ICD-10-CM

## 2024-09-18 RX ORDER — FLUTICASONE FUROATE, UMECLIDINIUM BROMIDE AND VILANTEROL TRIFENATATE 100; 62.5; 25 UG/1; UG/1; UG/1
1 POWDER RESPIRATORY (INHALATION) DAILY
Qty: 3 EACH | Refills: 3 | Status: SHIPPED | OUTPATIENT
Start: 2024-09-18

## 2024-09-19 RX ORDER — LISINOPRIL 10 MG/1
10 TABLET ORAL DAILY
Qty: 90 TABLET | Refills: 1 | Status: SHIPPED | OUTPATIENT
Start: 2024-09-19

## 2024-09-29 DIAGNOSIS — I10 ESSENTIAL HYPERTENSION WITH GOAL BLOOD PRESSURE LESS THAN 140/90: ICD-10-CM

## 2024-09-30 RX ORDER — METOPROLOL SUCCINATE 50 MG/1
50 TABLET, EXTENDED RELEASE ORAL DAILY
Qty: 90 TABLET | Refills: 1 | Status: SHIPPED | OUTPATIENT
Start: 2024-09-30

## 2024-10-21 NOTE — PROGRESS NOTES
Rubio Gibbons is a 78-year-old gentleman who is status post EVAR of a 5.5 cm AAA utilizing a Medtronic 25 mm bifurcated device on 7/5/2022.  He presents today for annual CTA follow-up.  He is in good spirits and denies lower extremity claudication.  He is without complaints.    Exam:  Well-developed male alert and oriented x 3.  Blood pressure 143/79 with pulse of 70.  Bilateral groin incisions well-healed.  2+ ankle edema bilaterally.  2+ palpable PT pulses bilaterally.    Imaging:  CTA of the chest, abdomen, and pelvis 10/26/2023 shows a well-positioned endograft with no endoleak.  Aneurysmal sac size is unchanged at 5.4 cm.  Mild aneurysmal dilatation of the juxtarenal abdominal aorta measuring approximately 3 cm.  Aortic root dilatation measuring 4 cm.    IMPRESSION:  15-month status post EVAR clinically doing well.  Postprocedural CTA findings as noted above.  I do have some concerns about potential remodeling of the proximal aortic neck.  We did not utilize Aptus Endo anchors at the index procedure due to the moderate posterior mural thrombus.  He will be at some risk for type Ia endoleak if there is any additional growth of the infrarenal neck.    PLAN:  I reviewed all the above with Rubio.  He will continue his medical regimen including daily aspirin.  Vascular surgical follow-up will be in 1 year for EVAR CTA with particular attention paid to the proximal seal zone.    Total length of this encounter was 20 minutes with time spent reviewing studies, interviewing and examining the patient, answering questions, and coordinating a treatment plan.    Konrad Mays MD

## 2024-10-26 ENCOUNTER — HEALTH MAINTENANCE LETTER (OUTPATIENT)
Age: 79
End: 2024-10-26

## 2024-11-08 ENCOUNTER — HOSPITAL ENCOUNTER (OUTPATIENT)
Dept: CT IMAGING | Facility: CLINIC | Age: 79
Discharge: HOME | End: 2024-11-08
Attending: SURGERY | Admitting: SURGERY
Payer: COMMERCIAL

## 2024-11-08 DIAGNOSIS — Z95.828 HISTORY OF ENDOVASCULAR STENT GRAFT FOR ABDOMINAL AORTIC ANEURYSM (AAA): ICD-10-CM

## 2024-11-08 LAB
CREAT BLD-MCNC: 1.1 MG/DL (ref 0.7–1.3)
EGFRCR SERPLBLD CKD-EPI 2021: >60 ML/MIN/1.73M2

## 2024-11-08 PROCEDURE — 74174 CTA ABD&PLVS W/CONTRAST: CPT

## 2024-11-08 PROCEDURE — 82565 ASSAY OF CREATININE: CPT

## 2024-11-08 PROCEDURE — 250N000009 HC RX 250: Performed by: SURGERY

## 2024-11-08 PROCEDURE — 250N000011 HC RX IP 250 OP 636: Performed by: SURGERY

## 2024-11-08 RX ORDER — IOPAMIDOL 755 MG/ML
500 INJECTION, SOLUTION INTRAVASCULAR ONCE
Status: COMPLETED | OUTPATIENT
Start: 2024-11-08 | End: 2024-11-08

## 2024-11-08 RX ADMIN — SODIUM CHLORIDE 80 ML: 9 INJECTION, SOLUTION INTRAVENOUS at 07:42

## 2024-11-08 RX ADMIN — IOPAMIDOL 72 ML: 755 INJECTION, SOLUTION INTRAVENOUS at 07:42

## 2024-11-12 ENCOUNTER — OFFICE VISIT (OUTPATIENT)
Dept: FAMILY MEDICINE | Facility: CLINIC | Age: 79
End: 2024-11-12
Payer: COMMERCIAL

## 2024-11-12 VITALS
BODY MASS INDEX: 28.51 KG/M2 | TEMPERATURE: 97.6 F | RESPIRATION RATE: 18 BRPM | WEIGHT: 177.4 LBS | SYSTOLIC BLOOD PRESSURE: 137 MMHG | HEART RATE: 50 BPM | DIASTOLIC BLOOD PRESSURE: 70 MMHG | HEIGHT: 66 IN | OXYGEN SATURATION: 95 %

## 2024-11-12 DIAGNOSIS — J43.1 PANLOBULAR EMPHYSEMA (H): ICD-10-CM

## 2024-11-12 DIAGNOSIS — R49.9 VOICE DISORDER: ICD-10-CM

## 2024-11-12 DIAGNOSIS — E11.59 TYPE 2 DIABETES MELLITUS WITH OTHER CIRCULATORY COMPLICATION, WITHOUT LONG-TERM CURRENT USE OF INSULIN (H): Primary | ICD-10-CM

## 2024-11-12 DIAGNOSIS — N52.1 ERECTILE DYSFUNCTION DUE TO DISEASES CLASSIFIED ELSEWHERE: ICD-10-CM

## 2024-11-12 DIAGNOSIS — H91.13 PRESBYCUSIS OF BOTH EARS: ICD-10-CM

## 2024-11-12 DIAGNOSIS — I25.10 CORONARY ARTERY DISEASE INVOLVING NATIVE CORONARY ARTERY OF NATIVE HEART WITHOUT ANGINA PECTORIS: ICD-10-CM

## 2024-11-12 DIAGNOSIS — E03.4 HYPOTHYROIDISM DUE TO ACQUIRED ATROPHY OF THYROID: ICD-10-CM

## 2024-11-12 PROBLEM — Z98.1 S/P CERVICAL SPINAL FUSION: Status: RESOLVED | Noted: 2019-10-28 | Resolved: 2024-11-12

## 2024-11-12 PROBLEM — M54.41 MIDLINE LOW BACK PAIN WITH RIGHT-SIDED SCIATICA: Status: RESOLVED | Noted: 2021-02-25 | Resolved: 2024-11-12

## 2024-11-12 PROBLEM — M54.50 CHRONIC BILATERAL LOW BACK PAIN WITHOUT SCIATICA: Status: ACTIVE | Noted: 2019-09-10

## 2024-11-12 PROBLEM — Z87.891 PERSONAL HISTORY OF TOBACCO USE: Status: RESOLVED | Noted: 2019-09-10 | Resolved: 2024-11-12

## 2024-11-12 PROBLEM — G89.29 CHRONIC BILATERAL LOW BACK PAIN WITHOUT SCIATICA: Status: ACTIVE | Noted: 2019-09-10

## 2024-11-12 PROBLEM — R26.9 GAIT DISTURBANCE: Status: RESOLVED | Noted: 2018-08-03 | Resolved: 2024-11-12

## 2024-11-12 PROBLEM — Z79.2 NEED FOR PROPHYLACTIC ANTIBIOTIC: Status: RESOLVED | Noted: 2022-07-05 | Resolved: 2024-11-12

## 2024-11-12 LAB
EST. AVERAGE GLUCOSE BLD GHB EST-MCNC: 163 MG/DL
HBA1C MFR BLD: 7.3 % (ref 0–5.6)
HOLD SPECIMEN: NORMAL
TSH SERPL DL<=0.005 MIU/L-ACNC: 5.66 UIU/ML (ref 0.3–4.2)

## 2024-11-12 PROCEDURE — 83036 HEMOGLOBIN GLYCOSYLATED A1C: CPT | Performed by: FAMILY MEDICINE

## 2024-11-12 PROCEDURE — 99214 OFFICE O/P EST MOD 30 MIN: CPT | Performed by: FAMILY MEDICINE

## 2024-11-12 PROCEDURE — 84443 ASSAY THYROID STIM HORMONE: CPT | Performed by: FAMILY MEDICINE

## 2024-11-12 PROCEDURE — 36415 COLL VENOUS BLD VENIPUNCTURE: CPT | Performed by: FAMILY MEDICINE

## 2024-11-12 PROCEDURE — G2211 COMPLEX E/M VISIT ADD ON: HCPCS | Performed by: FAMILY MEDICINE

## 2024-11-12 RX ORDER — SILDENAFIL 50 MG/1
50-100 TABLET, FILM COATED ORAL DAILY PRN
Qty: 30 TABLET | Refills: 3 | Status: SHIPPED | OUTPATIENT
Start: 2024-11-12

## 2024-11-12 NOTE — ASSESSMENT & PLAN NOTE
Diabetes is well controlled on diet only.   Check A1c today. Pt exercises on regular basis, for 2 hours a day.

## 2024-11-12 NOTE — PROGRESS NOTES
"  Assessment & Plan   Problem List Items Addressed This Visit       Panlobular emphysema (H)     No resting symptoms, stable with exercise.  Continue on trelegy daily, according to the patient he said it works very well.         Presbycusis syndrome, bilateral     Uses hearing aids.         Type 2 diabetes mellitus with circulatory disorder, without long-term current use of insulin (H) - Primary     Diabetes is well controlled on diet only.   Check A1c today. Pt exercises on regular basis, for 2 hours a day.         Relevant Orders    OPTOMETRY REFERRAL    Coronary artery disease involving native coronary artery of native heart without angina pectoris     Hx of MI 1998, s/p angiogram but no stent put in,   Myocardial perfusion imaging 2019  demonstrated a fixed apical/distal anteroseptal defect consistent with  a small nontransmural infarct. No ischemia, and Ef was 54%.  Seen by cardiology in 2022, advised to continue on ASA, Atorvastatin, and lisinopril.             Voice disorder     Pt complains of gravely voice related to his COPD. No changes.         Erectile dysfunction due to diseases classified elsewhere     Pt has been struggling with ED, for the last 2 to 3 years, he gets mild erection but not fully, he gets morning erections.  Plan: start on sildenafil 50 to 100 mg as needed.         Relevant Medications    sildenafil (VIAGRA) 50 MG tablet     Other Visit Diagnoses       Hypothyroidism due to acquired atrophy of thyroid        Relevant Orders    TSH                  BMI  Estimated body mass index is 28.63 kg/m  as calculated from the following:    Height as of this encounter: 1.676 m (5' 6\").    Weight as of this encounter: 80.5 kg (177 lb 6.4 oz).             Loco Bergeron is a 79 year old, presenting for the following health issues:  Recheck Medication (Medication list and check A1C)      11/12/2024     9:15 AM   Additional Questions   Roomed by Pattie   Accompanied by Self     HPI       Medication " "check  Diabetes Follow-up    How often are you checking your blood sugar? Not at all  What concerns do you have today about your diabetes? None   Do you have any of these symptoms? (Select all that apply)  No numbness or tingling in feet.  No redness, sores or blisters on feet.  No complaints of excessive thirst.  No reports of blurry vision.  No significant changes to weight.  Have you had a diabetic eye exam in the last 12 months? No        BP Readings from Last 2 Encounters:   11/12/24 137/70   05/16/24 137/79     Hemoglobin A1C (%)   Date Value   11/12/2024 7.3 (H)   03/26/2024 7.6 (H)   02/25/2021 7.6 (H)   08/14/2020 7.2 (H)     LDL Cholesterol Calculated (mg/dL)   Date Value   03/26/2024 50   04/04/2023 60   08/14/2020 67   09/10/2019 68             Vascular Disease Follow-up    How often do you take nitroglycerin? Never  Do you take an aspirin every day? Yes            Objective    /70 (BP Location: Right arm, Patient Position: Sitting, Cuff Size: Adult Large)   Pulse 50   Temp 97.6  F (36.4  C) (Oral)   Resp 18   Ht 1.676 m (5' 6\")   Wt 80.5 kg (177 lb 6.4 oz)   SpO2 95%   BMI 28.63 kg/m    Body mass index is 28.63 kg/m .  Physical Exam   GENERAL: alert and no distress  NECK: no adenopathy, no asymmetry, masses, or scars  RESP: lungs clear to auscultation - no rales, rhonchi or wheezes  CV: regular rate and rhythm, normal S1 S2, no S3 or S4, no murmur, click or rub, no peripheral edema  ABDOMEN: soft, nontender, no hepatosplenomegaly, no masses and bowel sounds normal  MS: no gross musculoskeletal defects noted, no edema            Signed Electronically by: Nafisa Workman MD    "

## 2024-11-12 NOTE — ASSESSMENT & PLAN NOTE
Pt has been struggling with ED, for the last 2 to 3 years, he gets mild erection but not fully, he gets morning erections.  Plan: start on sildenafil 50 to 100 mg as needed.

## 2024-11-12 NOTE — ASSESSMENT & PLAN NOTE
Hx of MI 1998, s/p angiogram but no stent put in,   Myocardial perfusion imaging 2019  demonstrated a fixed apical/distal anteroseptal defect consistent with  a small nontransmural infarct. No ischemia, and Ef was 54%.  Seen by cardiology in 2022, advised to continue on ASA, Atorvastatin, and lisinopril.

## 2024-11-13 ENCOUNTER — TELEPHONE (OUTPATIENT)
Dept: FAMILY MEDICINE | Facility: CLINIC | Age: 79
End: 2024-11-13
Payer: COMMERCIAL

## 2024-11-13 DIAGNOSIS — E03.4 HYPOTHYROIDISM DUE TO ACQUIRED ATROPHY OF THYROID: ICD-10-CM

## 2024-11-13 RX ORDER — LEVOTHYROXINE SODIUM 125 UG/1
125 TABLET ORAL DAILY
Qty: 90 TABLET | Refills: 1 | Status: SHIPPED | OUTPATIENT
Start: 2024-11-13

## 2024-11-13 NOTE — TELEPHONE ENCOUNTER
Thyroid is low, I recommend increasing the dose of Levothyroxine medication to 125 Mcg, repeat the labs in 2 months please.

## 2024-11-13 NOTE — TELEPHONE ENCOUNTER
RN spoke to patient     Reviewed results note below from Dr. Workman   Patient will  new dose of levothyroxine 125 mcg at [facility]   Lab only appt scheduled 1/13/2025 @ 8:15     Shamika Healy Registered Nurse  [facility]

## 2024-11-15 ENCOUNTER — OFFICE VISIT (OUTPATIENT)
Dept: OTHER | Facility: CLINIC | Age: 79
End: 2024-11-15
Attending: SURGERY
Payer: COMMERCIAL

## 2024-11-15 VITALS — SYSTOLIC BLOOD PRESSURE: 161 MMHG | HEART RATE: 55 BPM | DIASTOLIC BLOOD PRESSURE: 90 MMHG

## 2024-11-15 DIAGNOSIS — Z95.828 HISTORY OF ENDOVASCULAR STENT GRAFT FOR ABDOMINAL AORTIC ANEURYSM (AAA): Primary | ICD-10-CM

## 2024-11-15 PROCEDURE — G0463 HOSPITAL OUTPT CLINIC VISIT: HCPCS | Performed by: SURGERY

## 2024-11-15 PROCEDURE — 99213 OFFICE O/P EST LOW 20 MIN: CPT | Performed by: SURGERY

## 2024-11-15 NOTE — PROGRESS NOTES
[facility]        Patient is here for a  follow up.    Pt is currently taking Aspirin and Statin.    BP (!) 161/90 (BP Location: Right arm, Patient Position: Chair, Cuff Size: Adult Regular)   Pulse 55     The provider has been notified that the patient has no concerns.     Questions patient would like addressed today are: N/A.    Refills are needed: N/A    Has homecare services and agency name:  Tisha Pate MA

## 2024-11-15 NOTE — PROGRESS NOTES
Rubio Gibbons is an 79-year-old gentleman who is status post EVAR (Medtronic 25 x 14 x 103 mm main body) of a 5.5 cm AAA with bilateral common iliac artery ectasia in 2022.  At the index procedure there was a moderate amount of posterior thrombus in the infrarenal seal zone which prompted us to avoid aggressive balloon angioplasty of the proximal seal zone or placement of Endo anchors at that time.  His case was uneventful and I have been following him with serial imaging.  Rubio presents today for annual CTA.  He is in good spirits and without complaints.  He remains physically active and works out at a regular basis in a local gymnasium.    Exam:  Well-developed male alert and oriented x 3.  Blood pressure 161/90 with pulse of 55.  Easily palpable pedal pulses bilaterally.    Imaging:  I reviewed the EVAR CTA 11/8/2024 showing well-positioned endograft.  Maximal aneurysmal diameter is now 5.8 cm AP by 5.3 cm transverse.  No endoleak noted.  There is still prominent posterior thrombus in the proximal seal zone and I do have concerns about infrarenal neck remodeling and potential loss of graft/aortic wall apposition.    ASSESSMENT:  79-year-old gentleman greater than 2 years status post repair of a 5.5 cm infrarenal AAA using a 25 mm Medtronic main body device.  Minimal increase in aneurysmal diameter now to 5.8 cm.  No obvious endoleak but with some persistent thrombus posteriorly in the infrarenal aortic neck.  I do have concerns about potential neck remodeling and possible occult type Ia endoleaks.    PLAN:  I reviewed all the above in great detail with Rubio showing him the thrombus in the proximal seal zone.  I plan to review his images with my Medtr[employer] representative to discuss his potential suitability for application of Aptus Endo anchors.  Rubio understands that I am leaving the arterial practice at the end of this year.  I plan to transfer his care to my partner, Dr. Dale Ferreira.  While we are certainly not at  that point, I would have concerns if his aneurysm were to continue growing in the years to come.  We discussed the potential for proximal extension with a branched device or the remote possibility of need for explantation and conversion to open repair.  If he is felt to be a candidate for placement of Endo anchors I will contact him to facilitate this.  Otherwise I will arrange for future close follow-up with Dr. Ferreira and pre-clinic repeat CTA in 4-6 months.    All of his questions were answered and he verbalizes full understanding to the above and complete agreement with this management plan.    Total length of this encounter was 20 minutes with time spent reviewing records, interviewing and examining the patient, answering questions, and coordinating a treatment plan.    Konrad Mays MD

## 2025-01-13 ENCOUNTER — LAB (OUTPATIENT)
Dept: LAB | Facility: CLINIC | Age: 80
End: 2025-01-13
Payer: COMMERCIAL

## 2025-01-13 DIAGNOSIS — E03.4 HYPOTHYROIDISM DUE TO ACQUIRED ATROPHY OF THYROID: ICD-10-CM

## 2025-01-13 LAB — TSH SERPL DL<=0.005 MIU/L-ACNC: 1.12 UIU/ML (ref 0.3–4.2)

## 2025-01-14 ENCOUNTER — TELEPHONE (OUTPATIENT)
Dept: FAMILY MEDICINE | Facility: CLINIC | Age: 80
End: 2025-01-14
Payer: COMMERCIAL

## 2025-03-25 DIAGNOSIS — I10 ESSENTIAL HYPERTENSION WITH GOAL BLOOD PRESSURE LESS THAN 140/90: ICD-10-CM

## 2025-03-25 RX ORDER — LISINOPRIL 10 MG/1
10 TABLET ORAL DAILY
Qty: 90 TABLET | Refills: 3 | Status: SHIPPED | OUTPATIENT
Start: 2025-03-25

## 2025-03-26 DIAGNOSIS — I10 ESSENTIAL HYPERTENSION WITH GOAL BLOOD PRESSURE LESS THAN 140/90: ICD-10-CM

## 2025-03-26 RX ORDER — METOPROLOL SUCCINATE 50 MG/1
50 TABLET, EXTENDED RELEASE ORAL DAILY
Qty: 90 TABLET | Refills: 3 | Status: SHIPPED | OUTPATIENT
Start: 2025-03-26

## 2025-04-29 ENCOUNTER — HOSPITAL ENCOUNTER (OUTPATIENT)
Dept: CT IMAGING | Facility: CLINIC | Age: 80
Discharge: HOME | End: 2025-04-29
Attending: SURGERY | Admitting: SURGERY
Payer: COMMERCIAL

## 2025-04-29 DIAGNOSIS — Z95.828 HISTORY OF ENDOVASCULAR STENT GRAFT FOR ABDOMINAL AORTIC ANEURYSM (AAA): ICD-10-CM

## 2025-04-29 LAB
CREAT BLD-MCNC: 1 MG/DL (ref 0.7–1.2)
EGFRCR SERPLBLD CKD-EPI 2021: >60 ML/MIN/1.73M2

## 2025-04-29 PROCEDURE — 250N000011 HC RX IP 250 OP 636: Performed by: SURGERY

## 2025-04-29 PROCEDURE — 82565 ASSAY OF CREATININE: CPT

## 2025-04-29 PROCEDURE — 74174 CTA ABD&PLVS W/CONTRAST: CPT

## 2025-04-29 PROCEDURE — 250N000009 HC RX 250: Performed by: SURGERY

## 2025-04-29 RX ORDER — IOPAMIDOL 755 MG/ML
500 INJECTION, SOLUTION INTRAVASCULAR ONCE
Status: COMPLETED | OUTPATIENT
Start: 2025-04-29 | End: 2025-04-29

## 2025-04-29 RX ADMIN — IOPAMIDOL 72 ML: 755 INJECTION, SOLUTION INTRAVENOUS at 10:58

## 2025-04-29 RX ADMIN — SODIUM CHLORIDE 80 ML: 9 INJECTION, SOLUTION INTRAVENOUS at 10:59

## 2025-05-05 DIAGNOSIS — E78.5 HYPERLIPIDEMIA LDL GOAL <100: ICD-10-CM

## 2025-05-05 RX ORDER — ATORVASTATIN CALCIUM 40 MG/1
40 TABLET, FILM COATED ORAL DAILY
Qty: 90 TABLET | Refills: 3 | Status: SHIPPED | OUTPATIENT
Start: 2025-05-05

## 2025-05-07 ENCOUNTER — OFFICE VISIT (OUTPATIENT)
Dept: SURGERY | Facility: CLINIC | Age: 80
End: 2025-05-07
Attending: SURGERY
Payer: COMMERCIAL

## 2025-05-07 VITALS
HEIGHT: 66 IN | HEART RATE: 59 BPM | OXYGEN SATURATION: 97 % | DIASTOLIC BLOOD PRESSURE: 84 MMHG | BODY MASS INDEX: 28.45 KG/M2 | SYSTOLIC BLOOD PRESSURE: 126 MMHG | WEIGHT: 177 LBS

## 2025-05-07 DIAGNOSIS — I71.43 INFRARENAL ABDOMINAL AORTIC ANEURYSM (AAA) WITHOUT RUPTURE: Primary | ICD-10-CM

## 2025-05-07 PROCEDURE — 3079F DIAST BP 80-89 MM HG: CPT | Performed by: SURGERY

## 2025-05-07 PROCEDURE — 99214 OFFICE O/P EST MOD 30 MIN: CPT | Performed by: SURGERY

## 2025-05-07 PROCEDURE — 3074F SYST BP LT 130 MM HG: CPT | Performed by: SURGERY

## 2025-05-07 NOTE — PROGRESS NOTES
I had the pleasure of meeting Mr. Rubio Gibbons vascular surgery clinic today.  He is accompanied by his wife.  They both are exceedingly pleasant to engage with.  The summer 2022 he underwent bilateral common femoral artery exposure with endovascular abdominal aortic aneurysm repair.    Looking at CT angiogram from April 2022 (preoperative imaging) there was a posterior layer of thrombus starting from just below the level of the superior mesenteric artery and going into the infrarenal aorta.  The neck measured 21.5 mm.  The main body of the graft is 25 mm.    Intraoperative imaging, the main body of the aortobiiliac endoprosthesis was landed just below the left renal artery.  Completion imaging does not show any endoleaks.    CT angiogram in November 2024 showed that the transverse diameter had increased to 5.8 mm.  I specifically looked at the thickness of the thrombus in the posterior neck from the preoperative imaging to the 1 in November 2024.  I feel that the posterior thrombus has reduced in thickness.  There is no endoleak on the current imaging which was done on 29 April 2025.  There is no change in the size of the aneurysm sac either.    I discussed the findings with the patient and his wife.  While there is concern for the development of a potential type Ia endoleak it has not yet manifested in the last 4 months.  I think we can continue to observe the situation.  If there is any change in the sac size then we will proceed with Endo anchors.    We will use the left renal artery as a reference point for Endo anchor placement.  6 mm below the left renal artery we would deploy anchors at 3:00, 9:00 and 12:00.  The 6 o'clock position has thrombus and therefore will be avoided.  10 mm below the left renal artery we will deploy anchors at 6:00, 8:00 and 2:00.  And 14 mm below the left renal artery we will deploy anchors at 7:00 and 1:00.  For deployment of anchors at 6, 7 and 8:00 we will have this image  intensifier from under the operating table to give us the exact angle.  We will use the C armor drape which allows for the image intensifier to come under the bed without disrupting the sterile field.    All of this was discussed with the patient.    At this point I feel comfortable that we can closely monitor the situation and we will see him back in 6 months.  Patient and his wife have verbalized understanding.

## 2025-05-12 ENCOUNTER — OFFICE VISIT (OUTPATIENT)
Dept: FAMILY MEDICINE | Facility: CLINIC | Age: 80
End: 2025-05-12
Payer: COMMERCIAL

## 2025-05-12 VITALS
DIASTOLIC BLOOD PRESSURE: 77 MMHG | BODY MASS INDEX: 27.68 KG/M2 | RESPIRATION RATE: 16 BRPM | OXYGEN SATURATION: 96 % | WEIGHT: 172.2 LBS | HEART RATE: 53 BPM | SYSTOLIC BLOOD PRESSURE: 157 MMHG | HEIGHT: 66 IN | TEMPERATURE: 97.6 F

## 2025-05-12 DIAGNOSIS — J43.1 PANLOBULAR EMPHYSEMA (H): ICD-10-CM

## 2025-05-12 DIAGNOSIS — Z13.6 SCREENING FOR CARDIOVASCULAR CONDITION: ICD-10-CM

## 2025-05-12 DIAGNOSIS — Z00.00 ENCOUNTER FOR MEDICARE ANNUAL WELLNESS EXAM: ICD-10-CM

## 2025-05-12 DIAGNOSIS — N18.2 CKD (CHRONIC KIDNEY DISEASE) STAGE 2, GFR 60-89 ML/MIN: Primary | ICD-10-CM

## 2025-05-12 DIAGNOSIS — N52.1 ERECTILE DYSFUNCTION DUE TO DISEASES CLASSIFIED ELSEWHERE: ICD-10-CM

## 2025-05-12 DIAGNOSIS — E11.59 TYPE 2 DIABETES MELLITUS WITH OTHER CIRCULATORY COMPLICATION, WITHOUT LONG-TERM CURRENT USE OF INSULIN (H): ICD-10-CM

## 2025-05-12 DIAGNOSIS — I25.10 CORONARY ARTERY DISEASE INVOLVING NATIVE CORONARY ARTERY OF NATIVE HEART WITHOUT ANGINA PECTORIS: ICD-10-CM

## 2025-05-12 DIAGNOSIS — J42 CHRONIC BRONCHITIS, UNSPECIFIED CHRONIC BRONCHITIS TYPE (H): ICD-10-CM

## 2025-05-12 DIAGNOSIS — Z86.79 HISTORY OF ABDOMINAL AORTIC ANEURYSM (AAA): ICD-10-CM

## 2025-05-12 DIAGNOSIS — I10 ESSENTIAL HYPERTENSION WITH GOAL BLOOD PRESSURE LESS THAN 140/90: ICD-10-CM

## 2025-05-12 LAB
ANION GAP SERPL CALCULATED.3IONS-SCNC: 10 MMOL/L (ref 7–15)
BUN SERPL-MCNC: 29.9 MG/DL (ref 8–23)
CALCIUM SERPL-MCNC: 9.4 MG/DL (ref 8.8–10.4)
CHLORIDE SERPL-SCNC: 103 MMOL/L (ref 98–107)
CHOLEST SERPL-MCNC: 114 MG/DL
CREAT SERPL-MCNC: 1.11 MG/DL (ref 0.67–1.17)
EGFRCR SERPLBLD CKD-EPI 2021: 67 ML/MIN/1.73M2
EST. AVERAGE GLUCOSE BLD GHB EST-MCNC: 166 MG/DL
FASTING STATUS PATIENT QL REPORTED: YES
FASTING STATUS PATIENT QL REPORTED: YES
GLUCOSE SERPL-MCNC: 167 MG/DL (ref 70–99)
HBA1C MFR BLD: 7.4 % (ref 0–5.6)
HCO3 SERPL-SCNC: 25 MMOL/L (ref 22–29)
HDLC SERPL-MCNC: 33 MG/DL
LDLC SERPL CALC-MCNC: 60 MG/DL
NONHDLC SERPL-MCNC: 81 MG/DL
POTASSIUM SERPL-SCNC: 4.9 MMOL/L (ref 3.4–5.3)
SODIUM SERPL-SCNC: 138 MMOL/L (ref 135–145)
TRIGL SERPL-MCNC: 103 MG/DL

## 2025-05-12 PROCEDURE — 82043 UR ALBUMIN QUANTITATIVE: CPT | Performed by: FAMILY MEDICINE

## 2025-05-12 PROCEDURE — 82570 ASSAY OF URINE CREATININE: CPT | Performed by: FAMILY MEDICINE

## 2025-05-12 PROCEDURE — 80061 LIPID PANEL: CPT | Performed by: FAMILY MEDICINE

## 2025-05-12 PROCEDURE — 3077F SYST BP >= 140 MM HG: CPT | Performed by: FAMILY MEDICINE

## 2025-05-12 PROCEDURE — G2211 COMPLEX E/M VISIT ADD ON: HCPCS | Performed by: FAMILY MEDICINE

## 2025-05-12 PROCEDURE — 99214 OFFICE O/P EST MOD 30 MIN: CPT | Mod: 25 | Performed by: FAMILY MEDICINE

## 2025-05-12 PROCEDURE — 80048 BASIC METABOLIC PNL TOTAL CA: CPT | Performed by: FAMILY MEDICINE

## 2025-05-12 PROCEDURE — 3078F DIAST BP <80 MM HG: CPT | Performed by: FAMILY MEDICINE

## 2025-05-12 PROCEDURE — 99207 PR FOOT EXAM NO CHARGE: CPT | Performed by: FAMILY MEDICINE

## 2025-05-12 PROCEDURE — 83036 HEMOGLOBIN GLYCOSYLATED A1C: CPT | Performed by: FAMILY MEDICINE

## 2025-05-12 PROCEDURE — 36415 COLL VENOUS BLD VENIPUNCTURE: CPT | Performed by: FAMILY MEDICINE

## 2025-05-12 PROCEDURE — G0439 PPPS, SUBSEQ VISIT: HCPCS | Performed by: FAMILY MEDICINE

## 2025-05-12 PROCEDURE — 93000 ELECTROCARDIOGRAM COMPLETE: CPT | Performed by: FAMILY MEDICINE

## 2025-05-12 RX ORDER — SILDENAFIL 100 MG/1
100 TABLET, FILM COATED ORAL DAILY PRN
Qty: 30 TABLET | Refills: 0 | Status: SHIPPED | OUTPATIENT
Start: 2025-05-12

## 2025-05-12 SDOH — HEALTH STABILITY: PHYSICAL HEALTH: ON AVERAGE, HOW MANY MINUTES DO YOU ENGAGE IN EXERCISE AT THIS LEVEL?: 20 MIN

## 2025-05-12 SDOH — HEALTH STABILITY: PHYSICAL HEALTH: ON AVERAGE, HOW MANY DAYS PER WEEK DO YOU ENGAGE IN MODERATE TO STRENUOUS EXERCISE (LIKE A BRISK WALK)?: 7 DAYS

## 2025-05-12 ASSESSMENT — SOCIAL DETERMINANTS OF HEALTH (SDOH): HOW OFTEN DO YOU GET TOGETHER WITH FRIENDS OR RELATIVES?: MORE THAN THREE TIMES A WEEK

## 2025-05-12 NOTE — PATIENT INSTRUCTIONS
Patient Education   Preventive Care Advice   This is general advice given by our system to help you stay healthy. However, your care team may have specific advice just for you. Please talk to your care team about your preventive care needs.  Nutrition  Eat 5 or more servings of fruits and vegetables each day.  Try wheat bread, brown rice and whole grain pasta (instead of white bread, rice, and pasta).  Get enough calcium and vitamin D. Check the label on foods and aim for 100% of the RDA (recommended daily allowance).  Lifestyle  Exercise at least 150 minutes each week  (30 minutes a day, 5 days a week).  Do muscle strengthening activities 2 days a week. These help control your weight and prevent disease.  No smoking.  Wear sunscreen to prevent skin cancer.  Have a dental exam and cleaning every 6 months.  Yearly exams  See your health care team every year to talk about:  Any changes in your health.  Any medicines your care team has prescribed.  Preventive care, family planning, and ways to prevent chronic diseases.  Shots (vaccines)   HPV shots (up to age 26), if you've never had them before.  Hepatitis B shots (up to age 59), if you've never had them before.  COVID-19 shot: Get this shot when it's due.  Flu shot: Get a flu shot every year.  Tetanus shot: Get a tetanus shot every 10 years.  Pneumococcal, hepatitis A, and RSV shots: Ask your care team if you need these based on your risk.  Shingles shot (for age 50 and up)  General health tests  Diabetes screening:  Starting at age 35, Get screened for diabetes at least every 3 years.  If you are younger than age 35, ask your care team if you should be screened for diabetes.  Cholesterol test: At age 39, start having a cholesterol test every 5 years, or more often if advised.  Bone density scan (DEXA): At age 50, ask your care team if you should have this scan for osteoporosis (brittle bones).  Hepatitis C: Get tested at least once in your life.  STIs (sexually  transmitted infections)  Before age 24: Ask your care team if you should be screened for STIs.  After age 24: Get screened for STIs if you're at risk. You are at risk for STIs (including HIV) if:  You are sexually active with more than one person.  You don't use condoms every time.  You or a partner was diagnosed with a sexually transmitted infection.  If you are at risk for HIV, ask about PrEP medicine to prevent HIV.  Get tested for HIV at least once in your life, whether you are at risk for HIV or not.  Cancer screening tests  Cervical cancer screening: If you have a cervix, begin getting regular cervical cancer screening tests starting at age 21.  Breast cancer scan (mammogram): If you've ever had breasts, begin having regular mammograms starting at age 40. This is a scan to check for breast cancer.  Colon cancer screening: It is important to start screening for colon cancer at age 45.  Have a colonoscopy test every 10 years (or more often if you're at risk) Or, ask your provider about stool tests like a FIT test every year or Cologuard test every 3 years.  To learn more about your testing options, visit:   .  For help making a decision, visit:   https://bit.ly/sa52828.  Prostate cancer screening test: If you have a prostate, ask your care team if a prostate cancer screening test (PSA) at age 55 is right for you.  Lung cancer screening: If you are a current or former smoker ages 50 to 80, ask your care team if ongoing lung cancer screenings are right for you.  For informational purposes only. Not to replace the advice of your health care provider. Copyright   2023 [facility]. All rights reserved. Clinically reviewed by the [facility]. [employer] [id number] - REV 01/24.  Hearing Loss: Care Instructions  Overview     Hearing loss is a sudden or slow decrease in how well you hear. It can range from slight to profound. Permanent hearing loss can occur with aging. It also can  happen when you are exposed long-term to loud noise. Examples include listening to loud music, riding motorcycles, or being around other loud machines.  Hearing loss can affect your work and home life. It can make you feel lonely or depressed. You may feel that you have lost your independence. But hearing aids and other devices can help you hear better and feel connected to others.  Follow-up care is a key part of your treatment and safety. Be sure to make and go to all appointments, and call your doctor if you are having problems. It's also a good idea to know your test results and keep a list of the medicines you take.  How can you care for yourself at home?  Avoid loud noises whenever possible. This helps keep your hearing from getting worse.  Always wear hearing protection around loud noises.  Wear a hearing aid as directed.  A professional can help you pick a hearing aid that will work best for you.  You can also get hearing aids over the counter for mild to moderate hearing loss.  Have hearing tests as your doctor suggests. They can show whether your hearing has changed. Your hearing aid may need to be adjusted.  Use other devices as needed. These may include:  Telephone amplifiers and hearing aids that can connect to a television, stereo, radio, or microphone.  Devices that use lights or vibrations. These alert you to the doorbell, a ringing telephone, or a baby monitor.  Television closed-captioning. This shows the words at the bottom of the screen. Most new TVs can do this.  TTY (text telephone). This lets you type messages back and forth on the telephone instead of talking or listening. These devices are also called TDD. When messages are typed on the keyboard, they are sent over the phone line to a receiving TTY. The message is shown on a monitor.  Use text messaging, social media, and email if it is hard for you to communicate by telephone.  Try to learn a listening technique called speechreading. It is  "not lipreading. You pay attention to people's gestures, expressions, posture, and tone of voice. These clues can help you understand what a person is saying. Face the person you are talking to, and have them face you. Make sure the lighting is good. You need to see the other person's face clearly.  Think about counseling if you need help to adjust to your hearing loss.  When should you call for help?  Watch closely for changes in your health, and be sure to contact your doctor if:    You think your hearing is getting worse.     You have new symptoms, such as dizziness or nausea.   Where can you learn more?  Go to https://www.[employer].net/patiented  Enter R798 in the search box to learn more about \"Hearing Loss: Care Instructions.\"  Current as of: October 27, 2024  Content Version: 14.4    9173-7070 [employer].   Care instructions adapted under license by your healthcare professional. If you have questions about a medical condition or this instruction, always ask your healthcare professional. [employer] disclaims any warranty or liability for your use of this information.       "

## 2025-05-12 NOTE — PROGRESS NOTES
Preventive Care Visit  [facility]  Nafisa Workman MD, Family Medicine  May 12, 2025      Assessment & Plan     Chronic bronchitis, unspecified chronic bronchitis type (H)  - Continues to use trelegy inhaler; sometimes effective, sometimes not. Experiences chest tightness during exercise.  - Consideration of an extra inhaler or nebulizer if symptoms worsen. Pt does not wish to do so.    Screening for cardiovascular condition  - Concerns about chest tightness during exercise, potential cardiac issues.  - Perform EKG, pt does not wish to see Cardiology at this time, EKG did not show any new changes, there is questionable Q waves seen in the septal/anterior leads.    Coronary artery disease involving native coronary artery of native heart without angina pectoris  - History of heart attack; current chest tightness during exercise raises concern.  - EKG ordered, that did not show significant changes, still recommend cardiology consultation, pt declined at this time.    History of abdominal aortic aneurysm (AAA)  - Aneurysm monitored,  follow-up in 6 months.   - Continue monitoring as per vascular surgeron specialist's advice.    Panlobular emphysema (H)  - Continues to use inhaler; sometimes effective, sometimes not.  - Consideration of an extra inhaler or nebulizer if symptoms worsen.    Essential hypertension with goal blood pressure less than 140/90  - Blood pressure slightly high today.  - Recheck blood pressure.    Encounter for Medicare annual wellness exam  Noticed BP to be high, recheck blood pressure.    Erectile dysfunction due to diseases classified elsewhere  - Previous medications ineffective; Viagra effective but expensive.  - Prescribe Viagra, suggest using goodrx.com for potential cost savings.    Type 2 diabetes:  Well controlled, check labs today, and continue on diet only control.    Consent was obtained from the patient to use an AI documentation tool in the creation of this  "note.            BMI  Estimated body mass index is 27.79 kg/m  as calculated from the following:    Height as of this encounter: 1.676 m (5' 6\").    Weight as of this encounter: 78.1 kg (172 lb 3.2 oz).       Counseling  Appropriate preventive services were addressed with this patient via screening, questionnaire, or discussion as appropriate for fall prevention, nutrition, physical activity, Tobacco-use cessation, social engagement, weight loss and cognition.  Checklist reviewing preventive services available has been given to the patient.  Reviewed patient's diet, addressing concerns and/or questions.   The patient was instructed to see the dentist every 6 months.   The patient was provided with written information regarding signs of hearing loss.           Subjective   Rubio is a 80 year old, presenting for the following:  Annual Visit        5/12/2025    10:02 AM   Additional Questions   Roomed by Una REYNOSO         History of Present Illness-  - Rubio Gibbons, 80-year-old male, reports that diabetes does not bother him and he does not check his blood sugar levels.  - Heart disease is monitored by Dr. Ferreira. Rubio had an aneurysm with stenting of the aorta. A recent appointment at [facility] confirmed the stent is growing as expected, with the next follow-up scheduled in 6 months. He underwent bilateral common femoral artery exposure with endovascular abdominal aneurysm repair, and a CT scan was performed in 2024 prior to the recent appointment.  - Rubio exercises regularly with his wife at the gym and rides a bike 20 kilometers a day. He experiences hamstring pain rated at 9/10 when sitting with pressure on the area. He uses a topical medication for sore muscles, applied twice in the morning, which is effective for 3-4 days.  - He has mild kidney disease, slightly irritated due to high blood pressure. Zyprexa is noted to be helpful for kidney protection.  - Rubio uses an inhaler for lung issues, which is " sometimes effective and sometimes not. He uses a Trilogy inhaler once a day. He experiences chest tightness during exercise, which resolves after resting for a few minutes. There is no unbearable pain, but deep breaths can cause discomfort.  - He has chronic lower back pain due to spinal stenosis. He had surgery previously, but it was ineffective, and he chooses not to undergo another operation.  - Rubio has past issues with erectile dysfunction. Viagra was effective but expensive. He is considering trying it again if cheaper options are available.  - Blood pressure was noted to be slightly high during the encounter.  - Rubio finds [facility] boring and spends 2 hours at the gym daily.    Advance Care Planning    Discussed advance care planning with patient; informed AVS has link to [facility].        5/12/2025   General Health   How would you rate your overall physical health? (!) FAIR   Feel stress (tense, anxious, or unable to sleep) Not at all         5/12/2025   Nutrition   Diet: Regular (no restrictions)         5/12/2025   Exercise   Days per week of moderate/strenous exercise 7 days   Average minutes spent exercising at this level 20 min         5/12/2025   Social Factors   Frequency of gathering with friends or relatives More than three times a week   Worry food won't last until get money to buy more No   Food not last or not have enough money for food? No   Do you have housing? (Housing is defined as stable permanent housing and does not include staying outside in a car, in a tent, in an abandoned building, in an overnight shelter, or couch-surfing.) Yes   Are you worried about losing your housing? No   Lack of transportation? No   Unable to get utilities (heat,electricity)? No         5/12/2025   Fall Risk   Fallen 2 or more times in the past year? No     No    Trouble with walking or balance? No     No        Proxy-reported    Multiple values from one day are sorted in reverse-chronological order           5/12/2025   Activities of Daily Living- Home Safety   Needs help with the following daily activites None of the above   Safety concerns in the home None of the above         5/12/2025   Dental   Dentist two times every year? (!) NO         5/12/2025   Hearing Screening   Hearing concerns? (!) I NEED TO ASK PEOPLE TO SPEAK UP OR REPEAT THEMSELVES.         5/12/2025   Driving Risk Screening   Patient/family members have concerns about driving No         5/12/2025   General Alertness/Fatigue Screening   Have you been more tired than usual lately? No         5/12/2025   Urinary Incontinence Screening   Bothered by leaking urine in past 6 months No         Today's PHQ-2 Score:       5/12/2025    10:02 AM   PHQ-2 ( 1999 Pfizer)   Q1: Little interest or pleasure in doing things 0    Q2: Feeling down, depressed or hopeless 0    PHQ-2 Score 0    Q1: Little interest or pleasure in doing things Not at all   Q2: Feeling down, depressed or hopeless Not at all   PHQ-2 Score 0       Proxy-reported           5/12/2025   Substance Use   Alcohol more than 3/day or more than 7/wk Not Applicable   Do you have a current opioid prescription? No   How severe/bad is pain from 1 to 10? 9/10   Do you use any other substances recreationally? No     Social History     Tobacco Use    Smoking status: Former     Current packs/day: 0.25     Average packs/day: 0.3 packs/day for 40.0 years (10.0 ttl pk-yrs)     Types: Cigarettes     Passive exposure: Never    Smokeless tobacco: Never    Tobacco comments:     last one was 10/13 (trying to quit)   Vaping Use    Vaping status: Never Used   Substance Use Topics    Alcohol use: No    Drug use: No                 Reviewed and updated as needed this visit by Provider                    Past Medical History:   Diagnosis Date    Abdominal aortic aneurysm (AAA) without rupture 10/13/2016    CAD (coronary artery disease)     Cellulitis and abscess of leg 04/05/2023    Coronary artery disease involving  native coronary artery of native heart without angina pectoris 10/14/2019    Per Note. Dr. López OV 2-20-13 - to establish care. He has a history of myocardial infarction back in 1998 @ [facility] . Does not remember any details- and no stent.   Nuclear 3-12-13 LVEF 34% to establish care. He has a history of myocardial infarction back in 1998, small to moderately sized partially reversible distal anterior anteroseptal and apical defect, small to moderately sized inferio    COVID-19 virus vaccination declined 02/25/2021    Diabetes mellitus type 2, uncomplicated (H) 09/22/2016    Diet     WIN (dyspnea on exertion) 11/21/2014    Heart attack (H) 1998    History of myocardial infarction in adulthood 09/22/2016    History of tobacco abuse 09/10/2019    HTN, goal below 140/90 01/28/2014    Hyperlipidemia LDL goal <100 10/31/2010    Hypothyroidism 11/09/2011    Hypothyroidism due to acquired atrophy of thyroid 02/18/2016    Ischemic cardiomyopathy     LBP (low back pain) 11/21/2014    Lumbar radiculopathy 09/10/2019    Microalbuminuria 11/07/2016    X1    Midline low back pain with right-sided sciatica 02/25/2021    Morbid obesity (H) 02/25/2021    Muscle spasm 09/25/2017    Other abnormal glucose 05/08/2014    Panlobular emphysema (H) 09/22/2016    Personal history of tobacco use 09/10/2019    Presbycusis syndrome, bilateral 09/22/2016    Right-sided low back pain without sciatica, unspecified chronicity 03/16/2017    S/P lumbar spinal arthrodesis 09/10/2019    Tobacco use disorder 07/19/2006    Tubular adenoma     Type 2 diabetes mellitus with circulatory disorder, without long-term current use of insulin (H) 09/22/2016    Diet     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     Past Surgical History:   Procedure Laterality Date    ANGIOPLASTY  1998    COLONOSCOPY N/A 11/17/2016    Procedure: COLONOSCOPY;  Surgeon: Adam Lantigua MD;  Location: [facility]    ENDOVASCULAR REPAIR ANEURYSM ABDOMINAL AORTA N/A 7/5/2022    Procedure:  BILATERAL FEMORAL ARTERY EXPOSURE, BILATERAL FEMORAL ARTERY REPAIR, CUTDOWN APPROACH ENDOVASCULAR ABDOMINAL AORTA ANEURYSM REPAIR WITH MEDTRONIC STENT GRAFTS;  Surgeon: Ayush Mays MD;  Location: [facility] OR    IR ABDOMINAL ENDOVASCULAR STENT GRAFT  7/5/2022    OPTICAL TRACKING SYSTEM FUSION SPINE POSTERIOR LUMBAR TWO LEVELS N/A 10/28/2019    Procedure: L5 Herrera-Talavera osteotomy and L4-5 bilateral decompressive laminectomy with L5-S1 transforaminal lumbar interbody fusion and  L4-S1 posterior instrumented fusion;  Surgeon: Phillip Beavers MD;  Location:  OR     Current providers sharing in care for this patient include:  Patient Care Team:  Nafisa Workman MD as PCP - General (Family Medicine)  Wayne Clayton MD as MD (Dermatology)  Ashley Cabrera OD as MD (Ophthalmology)  Nafisa Workman MD as Referring Physician (Family Medicine)  Nafisa Workman MD as Assigned PCP  Ayush Mays MD as Assigned Heart and Vascular Surgical Provider    The following health maintenance items are reviewed in Epic and correct as of today:  Health Maintenance   Topic Date Due    EYE EXAM  03/15/2024    COVID-19 Vaccine (7 - 2024-25 season) 03/13/2025    MEDICARE ANNUAL WELLNESS VISIT  03/26/2025    BMP  03/26/2025    CMP  03/26/2025    LIPID  03/26/2025    MICROALBUMIN  03/26/2025    A1C  05/12/2025    DIABETIC FOOT EXAM  05/16/2025    ANNUAL REVIEW OF HM ORDERS  11/12/2025    TSH W/FREE T4 REFLEX  01/13/2026    DTAP/TDAP/TD IMMUNIZATION (2 - Td or Tdap) 02/18/2026    FALL RISK ASSESSMENT  05/12/2026    ADVANCE CARE PLANNING  05/16/2029    SPIROMETRY  Completed    COPD ACTION PLAN  Completed    PHQ-2 (once per calendar year)  Completed    INFLUENZA VACCINE  Completed    Pneumococcal Vaccine: 50+ Years  Completed    URINALYSIS  Completed    ZOSTER IMMUNIZATION  Completed    RSV VACCINE  Completed    HPV IMMUNIZATION  Aged Out    MENINGITIS IMMUNIZATION  Aged Out    COLORECTAL CANCER SCREENING  Discontinued  "   LUNG CANCER SCREENING  Discontinued         Review of Systems  Constitutional, HEENT, cardiovascular, pulmonary, GI, , musculoskeletal, neuro, skin, endocrine and psych systems are negative, except as otherwise noted.     Objective    Exam  BP (!) 149/79 (BP Location: Left arm, Patient Position: Chair, Cuff Size: Adult Small)   Pulse 61   Temp 97.6  F (36.4  C) (Oral)   Resp 16   Ht 1.676 m (5' 6\")   Wt 78.1 kg (172 lb 3.2 oz)   SpO2 96%   BMI 27.79 kg/m     Estimated body mass index is 27.79 kg/m  as calculated from the following:    Height as of this encounter: 1.676 m (5' 6\").    Weight as of this encounter: 78.1 kg (172 lb 3.2 oz).    Physical Exam  GENERAL: alert and no distress  NECK: no adenopathy, no asymmetry, masses, or scars  RESP: lungs clear to auscultation - no rales, rhonchi or wheezes  CV: regular rate and rhythm, normal S1 S2, no S3 or S4, no murmur, click or rub, no peripheral edema  ABDOMEN: soft, nontender, no hepatosplenomegaly, no masses and bowel sounds normal  MS: no gross musculoskeletal defects noted, no edema  Diabetic foot exam: normal DP and PT pulses, no trophic changes or ulcerative lesions, and normal sensory exam        5/12/2025   Mini Cog   Clock Draw Score 2 Normal   3 Item Recall 0 objects recalled   Mini Cog Total Score 2         Vision Screen         Signed Electronically by: Nafisa Workman MD    "

## 2025-05-12 NOTE — ASSESSMENT & PLAN NOTE
status post EVAR of a 5.5 cm AAA utilizing a Medtronic 25 mm bifurcated device on 7/5/2022   SEES VASCULAR SURGERY yearly.

## 2025-05-13 ENCOUNTER — RESULTS FOLLOW-UP (OUTPATIENT)
Dept: FAMILY MEDICINE | Facility: CLINIC | Age: 80
End: 2025-05-13

## 2025-05-13 LAB
CREAT UR-MCNC: 31.4 MG/DL
MICROALBUMIN UR-MCNC: 28.8 MG/L
MICROALBUMIN/CREAT UR: 91.72 MG/G CR (ref 0–17)

## 2025-05-13 NOTE — TELEPHONE ENCOUNTER
I would like nurse only BP recheck please in 1 to 2 weeks. BP was very high in the office, and did not go down.

## 2025-05-13 NOTE — LETTER
May 13, 2025      Rubio Gibbons  [address] [facility] [place] [address]        José Manuel Bergeron,    Your results are all normal including blood sugar A1c, lipid profile, and Basic profile, including your blood sugar, kidney function, calcium level and other electrolytes.        Resulted Orders   BASIC METABOLIC PANEL   Result Value Ref Range    Sodium 138 135 - 145 mmol/L    Potassium 4.9 3.4 - 5.3 mmol/L    Chloride 103 98 - 107 mmol/L    Carbon Dioxide (CO2) 25 22 - 29 mmol/L    Anion Gap 10 7 - 15 mmol/L    Urea Nitrogen 29.9 (H) 8.0 - 23.0 mg/dL    Creatinine 1.11 0.67 - 1.17 mg/dL    GFR Estimate 67 >60 mL/min/1.73m2      Comment:      eGFR calculated using 2021 CKD-EPI equation.    Calcium 9.4 8.8 - 10.4 mg/dL    Glucose 167 (H) 70 - 99 mg/dL    Patient Fasting > 8hrs? Yes    Lipid panel reflex to direct LDL Non-fasting   Result Value Ref Range    Cholesterol 114 <200 mg/dL    Triglycerides 103 <150 mg/dL    Direct Measure HDL 33 (L) >=40 mg/dL    LDL Cholesterol Calculated 60 <100 mg/dL    Non HDL Cholesterol 81 <130 mg/dL    Patient Fasting > 8hrs? Yes     Narrative    Cholesterol  Desirable: < 200 mg/dL  Borderline High: 200 - 239 mg/dL  High: >= 240 mg/dL    Triglycerides  Normal: < 150 mg/dL  Borderline High: 150 - 199 mg/dL  High: 200-499 mg/dL  Very High: >= 500 mg/dL    Direct Measure HDL  Female: >= 50 mg/dL   Male: >= 40 mg/dL    LDL Cholesterol  Desirable: < 100 mg/dL  Above Desirable: 100 - 129 mg/dL   Borderline High: 130 - 159 mg/dL   High:  160 - 189 mg/dL   Very High: >= 190 mg/dL    Non HDL Cholesterol  Desirable: < 130 mg/dL  Above Desirable: 130 - 159 mg/dL  Borderline High: 160 - 189 mg/dL  High: 190 - 219 mg/dL  Very High: >= 220 mg/dL   HEMOGLOBIN A1C   Result Value Ref Range    Estimated Average Glucose 166 (H) <117 mg/dL    Hemoglobin A1C 7.4 (H) 0.0 - 5.6 %      Comment:      Normal <5.7%   Prediabetes 5.7-6.4%    Diabetes 6.5% or higher     Note: Adopted from ADA consensus  guidelines.       If you have any questions or concerns, please call the clinic at the number listed above.       Sincerely,      Nafisa Workman MD    Electronically signed

## 2025-05-28 ENCOUNTER — ALLIED HEALTH/NURSE VISIT (OUTPATIENT)
Dept: FAMILY MEDICINE | Facility: CLINIC | Age: 80
End: 2025-05-28
Payer: COMMERCIAL

## 2025-05-28 VITALS — SYSTOLIC BLOOD PRESSURE: 138 MMHG | TEMPERATURE: 97.3 F | DIASTOLIC BLOOD PRESSURE: 77 MMHG | HEART RATE: 96 BPM

## 2025-05-28 DIAGNOSIS — Z01.30 BP CHECK: Primary | ICD-10-CM

## 2025-05-28 DIAGNOSIS — E03.4 HYPOTHYROIDISM DUE TO ACQUIRED ATROPHY OF THYROID: ICD-10-CM

## 2025-05-28 PROCEDURE — 99207 PR NO CHARGE NURSE ONLY: CPT

## 2025-05-28 PROCEDURE — 3078F DIAST BP <80 MM HG: CPT

## 2025-05-28 PROCEDURE — 3075F SYST BP GE 130 - 139MM HG: CPT

## 2025-05-28 RX ORDER — LEVOTHYROXINE SODIUM 125 UG/1
125 TABLET ORAL DAILY
Qty: 90 TABLET | Refills: 1 | Status: SHIPPED | OUTPATIENT
Start: 2025-05-28

## 2025-05-28 NOTE — PROGRESS NOTES
Rubio Gibbons is a 80 year old patient who comes in today for a Blood Pressure check.  Initial BP:  /77 (BP Location: Left arm, Patient Position: Sitting, Cuff Size: Adult Large)   Pulse 96   Temp 97.3  F (36.3  C) (Temporal)      Data Unavailable  Disposition: follow-up as previously indicated by provider and results routed to provider

## (undated) DEVICE — GLOVE PROTEXIS W/NEU-THERA 8.5  2D73TE85

## (undated) DEVICE — POSITIONER PT PRONESAFE HEAD REST W/DERMAPROX INSERT 40599

## (undated) DEVICE — CATH TRAY FOLEY COUDE 16FR SILVER W/URINE METER 350ML 304716

## (undated) DEVICE — DEVICE MULTI TORQUE TD01

## (undated) DEVICE — GLOVE PROTEXIS POWDER FREE 8.0 ORTHOPEDIC 2D73ET80

## (undated) DEVICE — DRSG GAUZE 4X4" 8044

## (undated) DEVICE — LINEN POUCH DBL 5427

## (undated) DEVICE — SOL NACL 0.9% INJ 1000ML BAG 2B1324X

## (undated) DEVICE — PREP DURAPREP 26ML APL 8630

## (undated) DEVICE — SHEATH INTRODUCER DRYSEAL FLEX W/HYDRO CT 14FRX33CM DSF1433

## (undated) DEVICE — SUCTION FRAZIER 12FR W/OBTURATOR 33120

## (undated) DEVICE — MIDAS REX DISSECTING TOOL TRI-FLUTED BURR 14MH30T

## (undated) DEVICE — DRSG KERLIX FLUFFS X5

## (undated) DEVICE — DRAIN JACKSON PRATT RESERVOIR 100ML SU130-1305

## (undated) DEVICE — NDL 22GA 1.5"

## (undated) DEVICE — SPONGE SURGIFOAM 100 1974

## (undated) DEVICE — DRSG TELFA ISLAND 4X10"

## (undated) DEVICE — SHEATH INTRODUCER DRYSEAL FLEX W/HYDRO CT 16FRX33CM DSF1633

## (undated) DEVICE — SU VICRYL 2-0 CT-1 27" J339H

## (undated) DEVICE — GOWN IMPERVIOUS SPECIALTY XL/XLONG 39049

## (undated) DEVICE — ESU BIPOLAR SEALER AQUAMANTYS 6MM 23-112-1

## (undated) DEVICE — PREP CHLORAPREP 26ML TINTED HI-LITE ORANGE 930815

## (undated) DEVICE — DECANTER BAG 2002S

## (undated) DEVICE — SURGICEL HEMOSTAT 2X14" 1951

## (undated) DEVICE — SYR 50ML LL W/O NDL 309653

## (undated) DEVICE — CATH BALLOON RELIANT STENT GRAFT 8FR REL46

## (undated) DEVICE — WIPES FOLEY CARE SURESTEP PROVON DFC100

## (undated) DEVICE — SU PROLENE 6-0 C-1DA 30" 8706H

## (undated) DEVICE — ESU GROUND PAD UNIVERSAL W/O CORD

## (undated) DEVICE — BLADE CLIPPER SGL USE 9680

## (undated) DEVICE — VESSEL LOOPS YELLOW MINI 31145660

## (undated) DEVICE — DRSG STERI STRIP 1/2X4" R1547

## (undated) DEVICE — COVER LIGHT HANDLE  HILL-ROM C LT-C02

## (undated) DEVICE — TUBING SUCTION 12"X1/4" N612

## (undated) DEVICE — SU SILK 2-0 TIE 24" SA75H

## (undated) DEVICE — GLOVE PROTEXIS MICRO 7.5  2D73PM75

## (undated) DEVICE — SU PROLENE 5-0 C-1DA 36" 8720H

## (undated) DEVICE — SOL NACL 0.9% IRRIG 1000ML BOTTLE 2F7124

## (undated) DEVICE — MARKER SPHERES PASSIVE MEDT PACK 5 8801075

## (undated) DEVICE — NDL BLUNT 18GA 1" W/O FILTER 305181

## (undated) DEVICE — SU VICRYL 0 CT-1 CR 8X18" J740D

## (undated) DEVICE — SPONGE COTTONOID 1/2X1" 80-1402

## (undated) DEVICE — LINEN TOWEL PACK X5 5464

## (undated) DEVICE — SU WND CLOSURE VLOC 180 ABS 3-0 12" P-14 VLOCL0114

## (undated) DEVICE — SU PROLENE 5-0 BBDA 36"  8580H

## (undated) DEVICE — GUIDEWIRE TERUMO .035X260CM ANG EXCHANGE GR3509

## (undated) DEVICE — SYR 10ML SLIP TIP W/O NDL

## (undated) DEVICE — DECANTER VIAL 2006S

## (undated) DEVICE — GOWN REINFORCED XXLG 9071

## (undated) DEVICE — SYR 10ML FINGER CONTROL W/O NDL 309695

## (undated) DEVICE — SU MONOCRYL 4-0 PS-2 18" UND Y496G

## (undated) DEVICE — SU VICRYL 2-0 CT 36" J357H

## (undated) DEVICE — SOL NACL 0.9% INJ 250ML BAG 2B1322Q

## (undated) DEVICE — DRAPE IOBAN INCISE 36X23" 6651EZ

## (undated) DEVICE — PACK SMALL SPINE RIDGES

## (undated) DEVICE — DRAIN JACKSON PRATT CHANNEL 10FR RND SIL W/TROCAR JP-2227

## (undated) DEVICE — Device

## (undated) DEVICE — PACK SPECIAL PROCEDURE CUSTOM

## (undated) DEVICE — GOWN XLG DISP 9545

## (undated) DEVICE — TUBING ANGIOGRAPHY 1200PSI 30"

## (undated) DEVICE — DRAPE MICROSCOPE OPMI ZEISS 48X118" 306071-0000-000

## (undated) DEVICE — NDL PERC ENTRY THINWALL 18GA 7.0" G00166

## (undated) DEVICE — SU VICRYL 3-0 CT-1 36" J338H

## (undated) DEVICE — DEVICE DUST COLLECTOR BONE BOX S-3500

## (undated) DEVICE — SUCTION MANIFOLD NEPTUNE 2 SYS 4 PORT 0702-020-000

## (undated) DEVICE — SU VICRYL 2-0 CT-1 18' J739D

## (undated) DEVICE — PACK AAA SBA15AAFS3

## (undated) DEVICE — STOPCOCK HIGH PRESSURE 3-WAY

## (undated) DEVICE — ADH SKIN CLOSURE PREMIERPRO EXOFIN 1.0ML 3470

## (undated) DEVICE — BLADE BONE MILL STRK 5.0MM MED 5400-701-000

## (undated) DEVICE — PACK SET-UP STD 9102

## (undated) DEVICE — BLADE CLIPPER 4406

## (undated) DEVICE — CATH TRAY FOLEY COUDE SURESTEP 16FR W/DRN BAG LATEX A304416A

## (undated) DEVICE — DRAPE SHEET REV FOLD 3/4 9349

## (undated) DEVICE — PEN MARKING SKIN W/LABELS 31145884

## (undated) DEVICE — SUCTION CANISTER MEDIVAC LINER 3000ML W/LID 65651-530

## (undated) DEVICE — INTRO SHEATH 6FRX10CM PINNACLE MARKER RSB612

## (undated) DEVICE — ESU CLEANER TIP 31142717

## (undated) DEVICE — MARKER SPHERES PASSIVE MEDT PACK 1 8801071

## (undated) DEVICE — WIRE GUIDE LUNDERQUIST .035X260CM G31453

## (undated) DEVICE — CATH ANGIO IMPRESS 5FRX65CM BERENSTEIN 56538BER

## (undated) DEVICE — LINEN DRAPE 54X72" 5467

## (undated) DEVICE — VESSEL LOOPS RED MAXI

## (undated) DEVICE — NDL 19GA 1.5"

## (undated) DEVICE — SOL WATER IRRIG 1000ML BOTTLE 2F7114

## (undated) DEVICE — LINEN ORTHO ACL PACK 5447

## (undated) DEVICE — RX SURGIFLO HEMOSTATIC MATRIX 8ML 2991

## (undated) DEVICE — DRAPE C-ARM 60X42" 1013

## (undated) DEVICE — DRAPE COVER C-ARM SEAMLESS SNAP-KAP 03-KP26 LATEX FREE

## (undated) DEVICE — CLIP ETHICON LIGACLIP SM BLUE LT100

## (undated) DEVICE — ESU GROUND PAD ADULT W/CORD E7507

## (undated) DEVICE — GLOVE PROTEXIS BLUE W/NEU-THERA 8.5  2D73EB85

## (undated) DEVICE — SU SILK 3-0 TIE 24" SA74H

## (undated) RX ORDER — GLYCOPYRROLATE 0.2 MG/ML
INJECTION, SOLUTION INTRAMUSCULAR; INTRAVENOUS
Status: DISPENSED
Start: 2022-07-05

## (undated) RX ORDER — FENTANYL CITRATE 50 UG/ML
INJECTION, SOLUTION INTRAMUSCULAR; INTRAVENOUS
Status: DISPENSED
Start: 2022-07-05

## (undated) RX ORDER — CEFAZOLIN SODIUM 2 G/100ML
INJECTION, SOLUTION INTRAVENOUS
Status: DISPENSED
Start: 2019-10-28

## (undated) RX ORDER — PROTAMINE SULFATE 10 MG/ML
INJECTION, SOLUTION INTRAVENOUS
Status: DISPENSED
Start: 2022-07-05

## (undated) RX ORDER — PHENYLEPHRINE HYDROCHLORIDE 10 MG/ML
INJECTION INTRAVENOUS
Status: DISPENSED
Start: 2019-10-28

## (undated) RX ORDER — ONDANSETRON 2 MG/ML
INJECTION INTRAMUSCULAR; INTRAVENOUS
Status: DISPENSED
Start: 2019-10-28

## (undated) RX ORDER — PROPOFOL 10 MG/ML
INJECTION, EMULSION INTRAVENOUS
Status: DISPENSED
Start: 2022-07-05

## (undated) RX ORDER — LIDOCAINE HYDROCHLORIDE 10 MG/ML
INJECTION, SOLUTION EPIDURAL; INFILTRATION; INTRACAUDAL; PERINEURAL
Status: DISPENSED
Start: 2022-07-05

## (undated) RX ORDER — EPINEPHRINE 1 MG/ML
INJECTION, SOLUTION INTRAMUSCULAR; SUBCUTANEOUS
Status: DISPENSED
Start: 2022-07-05

## (undated) RX ORDER — ONDANSETRON 2 MG/ML
INJECTION INTRAMUSCULAR; INTRAVENOUS
Status: DISPENSED
Start: 2022-07-05

## (undated) RX ORDER — LIDOCAINE HYDROCHLORIDE 10 MG/ML
INJECTION, SOLUTION EPIDURAL; INFILTRATION; INTRACAUDAL; PERINEURAL
Status: DISPENSED
Start: 2019-10-28

## (undated) RX ORDER — CEFAZOLIN SODIUM 1 G/3ML
INJECTION, POWDER, FOR SOLUTION INTRAMUSCULAR; INTRAVENOUS
Status: DISPENSED
Start: 2022-07-05

## (undated) RX ORDER — LIDOCAINE HYDROCHLORIDE 20 MG/ML
INJECTION, SOLUTION EPIDURAL; INFILTRATION; INTRACAUDAL; PERINEURAL
Status: DISPENSED
Start: 2022-07-05

## (undated) RX ORDER — GLYCOPYRROLATE 0.2 MG/ML
INJECTION INTRAMUSCULAR; INTRAVENOUS
Status: DISPENSED
Start: 2019-10-28

## (undated) RX ORDER — BUPIVACAINE HYDROCHLORIDE 2.5 MG/ML
INJECTION, SOLUTION EPIDURAL; INFILTRATION; INTRACAUDAL
Status: DISPENSED
Start: 2022-07-05

## (undated) RX ORDER — BUPIVACAINE HYDROCHLORIDE 5 MG/ML
INJECTION, SOLUTION EPIDURAL; INTRACAUDAL
Status: DISPENSED
Start: 2019-10-28

## (undated) RX ORDER — DEXAMETHASONE SODIUM PHOSPHATE 4 MG/ML
INJECTION, SOLUTION INTRA-ARTICULAR; INTRALESIONAL; INTRAMUSCULAR; INTRAVENOUS; SOFT TISSUE
Status: DISPENSED
Start: 2019-10-28

## (undated) RX ORDER — KETAMINE HCL IN 0.9 % NACL 50 MG/5 ML
SYRINGE (ML) INTRAVENOUS
Status: DISPENSED
Start: 2019-10-28

## (undated) RX ORDER — HYDROMORPHONE HYDROCHLORIDE 1 MG/ML
INJECTION, SOLUTION INTRAMUSCULAR; INTRAVENOUS; SUBCUTANEOUS
Status: DISPENSED
Start: 2022-07-05

## (undated) RX ORDER — PHENYLEPHRINE HCL IN 0.9% NACL 1 MG/10 ML
SYRINGE (ML) INTRAVENOUS
Status: DISPENSED
Start: 2019-10-28

## (undated) RX ORDER — HEPARIN SODIUM 1000 [USP'U]/ML
INJECTION, SOLUTION INTRAVENOUS; SUBCUTANEOUS
Status: DISPENSED
Start: 2022-07-05

## (undated) RX ORDER — VANCOMYCIN HYDROCHLORIDE 1 G/20ML
INJECTION, POWDER, LYOPHILIZED, FOR SOLUTION INTRAVENOUS
Status: DISPENSED
Start: 2019-10-28

## (undated) RX ORDER — PROPOFOL 10 MG/ML
INJECTION, EMULSION INTRAVENOUS
Status: DISPENSED
Start: 2019-10-28

## (undated) RX ORDER — NEOSTIGMINE METHYLSULFATE 1 MG/ML
VIAL (ML) INJECTION
Status: DISPENSED
Start: 2019-10-28

## (undated) RX ORDER — BUPIVACAINE HYDROCHLORIDE AND EPINEPHRINE 5; 5 MG/ML; UG/ML
INJECTION, SOLUTION EPIDURAL; INTRACAUDAL; PERINEURAL
Status: DISPENSED
Start: 2022-07-05

## (undated) RX ORDER — CEFAZOLIN SODIUM 1 G/3ML
INJECTION, POWDER, FOR SOLUTION INTRAMUSCULAR; INTRAVENOUS
Status: DISPENSED
Start: 2019-10-28

## (undated) RX ORDER — FENTANYL CITRATE 50 UG/ML
INJECTION, SOLUTION INTRAMUSCULAR; INTRAVENOUS
Status: DISPENSED
Start: 2019-10-28

## (undated) RX ORDER — NEOSTIGMINE METHYLSULFATE 1 MG/ML
VIAL (ML) INJECTION
Status: DISPENSED
Start: 2022-07-05